# Patient Record
Sex: MALE | Race: WHITE | NOT HISPANIC OR LATINO | Employment: OTHER | URBAN - METROPOLITAN AREA
[De-identification: names, ages, dates, MRNs, and addresses within clinical notes are randomized per-mention and may not be internally consistent; named-entity substitution may affect disease eponyms.]

---

## 2017-01-10 ENCOUNTER — OFFICE VISIT (OUTPATIENT)
Dept: FAMILY MEDICINE CLINIC | Facility: CLINIC | Age: 62
End: 2017-01-10

## 2017-01-10 VITALS
WEIGHT: 247 LBS | TEMPERATURE: 98.2 F | HEART RATE: 96 BPM | RESPIRATION RATE: 16 BRPM | HEIGHT: 70 IN | SYSTOLIC BLOOD PRESSURE: 104 MMHG | OXYGEN SATURATION: 99 % | BODY MASS INDEX: 35.36 KG/M2 | DIASTOLIC BLOOD PRESSURE: 74 MMHG

## 2017-01-10 DIAGNOSIS — R05.9 COUGH: Primary | ICD-10-CM

## 2017-01-10 DIAGNOSIS — J40 BRONCHITIS: ICD-10-CM

## 2017-01-10 DIAGNOSIS — R73.01 IMPAIRED FASTING GLUCOSE: ICD-10-CM

## 2017-01-10 DIAGNOSIS — H54.7 IMPAIRED VISION: ICD-10-CM

## 2017-01-10 DIAGNOSIS — I10 ESSENTIAL HYPERTENSION: ICD-10-CM

## 2017-01-10 PROCEDURE — 99214 OFFICE O/P EST MOD 30 MIN: CPT | Performed by: FAMILY MEDICINE

## 2017-01-10 RX ORDER — LISINOPRIL 20 MG/1
20 TABLET ORAL DAILY
Qty: 90 TABLET | Refills: 1 | Status: SHIPPED | OUTPATIENT
Start: 2017-01-10 | End: 2017-07-31 | Stop reason: SDUPTHER

## 2017-01-10 RX ORDER — AMLODIPINE BESYLATE 5 MG/1
5 TABLET ORAL DAILY
Qty: 90 TABLET | Refills: 1 | Status: SHIPPED | OUTPATIENT
Start: 2017-01-10 | End: 2017-07-31 | Stop reason: SDUPTHER

## 2017-01-10 RX ORDER — AZITHROMYCIN 250 MG/1
TABLET, FILM COATED ORAL
Qty: 6 TABLET | Refills: 0 | Status: SHIPPED | OUTPATIENT
Start: 2017-01-10 | End: 2017-07-31

## 2017-01-10 NOTE — PATIENT INSTRUCTIONS
Exercise 30 minutes most days of the week  Sleep 6-8 hours each night if possible  Low fat, low cholesterol diet   we discussed prescribed medications and how to take them   make sure you get results of any labs/studies ordered today  Low glycemic index diet   proventil inhaler

## 2017-01-10 NOTE — MR AVS SNAPSHOT
Darryn Baxter   1/10/2017 3:00 PM   Office Visit    Provider:  Darryn Nielsen MD   Department:  [facility]   Dept Phone:  [phone]                Your Full Care Plan              Today's Medication Changes          These changes are accurate as of: 1/10/17  4:56 PM.  If you have any questions, ask your nurse or doctor.               New Medication(s)Ordered:     azithromycin 250 MG tablet   Commonly known as:  ZITHROMAX Z-MAG   Take 2 tablets the first day, then 1 tablet daily for 4 days.   Started by:  Darryn Nielsen MD            Where to Get Your Medications      These medications were sent to [address] - [address] [facility] - [phone]  - [phone] [address] [address]     Phone:  [phone]     amLODIPine 5 MG tablet    azithromycin 250 MG tablet    lisinopril 20 MG tablet                  Your Updated Medication List          This list is accurate as of: 1/10/17  4:56 PM.  Always use your most recent med list.                amLODIPine 5 MG tablet   Commonly known as:  NORVASC   Take 1 tablet by mouth Daily.       aspirin 325 MG tablet       azithromycin 250 MG tablet   Commonly known as:  ZITHROMAX Z-MAG   Take 2 tablets the first day, then 1 tablet daily for 4 days.       docusate sodium 50 MG capsule   Commonly known as:  COLACE       lisinopril 20 MG tablet   Commonly known as:  PRINIVIL,ZESTRIL   Take 1 tablet by mouth Daily.       traMADol 50 MG tablet   Commonly known as:  ULTRAM       traMADol-acetaminophen 37.5-325 MG per tablet   Commonly known as:  ULTRACET   Take 1 tablet by mouth Every 4 (Four) Hours As Needed for moderate pain (4-6) for up to 30 doses.               You Were Diagnosed With        Codes Comments    Cough    -  Primary ICD-10-CM: R05  ICD-9-CM: 786.2     Essential hypertension     ICD-10-CM: I10  ICD-9-CM: 401.9     "Impaired fasting glucose     ICD-10-CM: R73.01  ICD-9-CM: 790.21     Impaired vision     ICD-10-CM: H54.7  ICD-9-CM: 369.9     Bronchitis     ICD-10-CM: J40  ICD-9-CM: 490       Instructions    Exercise 30 minutes most days of the week  Sleep 6-8 hours each night if possible  Low fat, low cholesterol diet   we discussed prescribed medications and how to take them   make sure you get results of any labs/studies ordered today  Low glycemic index diet   proventil inhaler       Patient Instructions History      [employer] Signup     Our records indicate that you have an active [employer] account.    You can view your After Visit Summary by going to [employer] and logging in with your [employer] username and password.  If you don't have a [employer] username and password but a parent or guardian has access to your record, the parent or guardian should login with their own [employer] username and password and access your record to view the After Visit Summary.    If you have questions, you can email Protection [email or web address]@"[email or web address]" or call [phone] to talk to our [employer] staff.  Remember, [employer] is NOT to be used for urgent needs.  For medical emergencies, dial 911.               Other Info from Your Visit           Your Appointments     Jul 10, 2017  3:00 PM EDT   Follow Up with Darryn Nielsen MD   [facility] (--)    [address] [place] [id number] [phone]           Arrive 15 minutes prior to appointment.              Allergies     Ether  Anaphylaxis    Latex  Rash      Reason for Visit     Hypertension     Cough           Vital Signs     Blood Pressure Pulse Temperature Respirations Height Weight    104/74 96 98.2 °F (36.8 °C) (Oral) 16 70\" (177.8 cm) 247 lb (112 kg)    Oxygen Saturation Body Mass Index Smoking Status             99% 35.44 kg/m2 Current Every Day Smoker         Problems and Diagnoses Noted     High blood pressure    " Increased fasting blood sugar    Impaired vision    Cough    -  Primary    Bronchitis

## 2017-01-10 NOTE — PROGRESS NOTES
"Subjective   Darryn Baxter is a 61 y.o. male.     History of Present Illness   Chief Complaint:   Chief Complaint   Patient presents with   • Hypertension       Darryn Baxter 61 y.o. male who presents today for Medical Management of the below listed issues and medication refills. He did not have the labs prior to his appointment.  He complains of chest congestion and a cough. He stated that this has been present for 5 days.    he has a history of   Patient Active Problem List   Diagnosis   • Essential hypertension   • Anxiety   • Impaired vision   • Impaired fasting glucose   .  Since the last visit, he has overall felt well.  he has been compliant with   Current Outpatient Prescriptions:   •  amLODIPine (NORVASC) 5 MG tablet, Take 1 tablet by mouth daily. (Patient taking differently: Take 5 mg by mouth Every Morning.), Disp: 90 tablet, Rfl: 1  •  aspirin 325 MG tablet, Take 650 mg by mouth Every 6 (Six) Hours. TO STOP PTS, Disp: , Rfl:   •  docusate sodium (COLACE) 50 MG capsule, Take  by mouth 2 (Two) Times a Day As Needed for constipation., Disp: , Rfl:   •  lisinopril (PRINIVIL,ZESTRIL) 20 MG tablet, Take 1 tablet by mouth daily. (Patient taking differently: Take 20 mg by mouth Every Morning.), Disp: 90 tablet, Rfl: 1  •  traMADol (ULTRAM) 50 MG tablet, Take 50 mg by mouth Every 6 (Six) Hours As Needed for moderate pain (4-6)., Disp: , Rfl:   •  traMADol-acetaminophen (ULTRACET) 37.5-325 MG per tablet, Take 1 tablet by mouth Every 4 (Four) Hours As Needed for moderate pain (4-6) for up to 30 doses., Disp: 30 tablet, Rfl: 0.  he denies medication side effects.    All of the chronic condition(s) listed above are stable w/o issues.    Visit Vitals   • /74   • Pulse 96   • Temp 98.2 °F (36.8 °C) (Oral)   • Resp 16   • Ht 70\" (177.8 cm)   • Wt 247 lb (112 kg)   • SpO2 99%   • BMI 35.44 kg/m2       Results for orders placed or performed in visit on 09/30/16   CBC (No diff): Prior to any surgical procedure unless " no testing indicated as above.   Result Value Ref Range    WBC 8.80 4.50 - 10.70 10*3/mm3    RBC 4.35 (L) 4.60 - 6.00 10*6/mm3    Hemoglobin 14.2 13.7 - 17.6 g/dL    Hematocrit 42.5 40.4 - 52.2 %    MCV 97.7 (H) 79.8 - 96.2 fL    MCH 32.6 27.0 - 32.7 pg    MCHC 33.4 32.6 - 36.4 g/dL    RDW 12.6 11.5 - 14.5 %    RDW-SD 45.4 37.0 - 54.0 fl    MPV 9.6 6.0 - 12.0 fL    Platelets 250 140 - 500 10*3/mm3   Basic metabolic panel: Patients with a reported history of chronic illness (example: DM, HTN, Renal Disease) and patients who are on multiple or long-term medications.   Result Value Ref Range    Glucose 104 (H) 65 - 99 mg/dL    BUN 13 8 - 23 mg/dL    Creatinine 0.84 0.76 - 1.27 mg/dL    Sodium 140 136 - 145 mmol/L    Potassium 4.9 3.5 - 5.2 mmol/L    Chloride 104 98 - 107 mmol/L    CO2 22.5 22.0 - 29.0 mmol/L    Calcium 9.5 8.6 - 10.5 mg/dL    eGFR Non African Amer 92 >60 mL/min/1.73    BUN/Creatinine Ratio 15.5 7.0 - 25.0    Anion Gap 13.5 mmol/L         The following portions of the patient's history were reviewed and updated as appropriate: allergies, current medications, past family history, past medical history, past social history, past surgical history and problem list.    Review of Systems   Constitutional: Negative for activity change, appetite change and unexpected weight change.   HENT: Positive for congestion and sinus pressure.    Eyes: Negative for visual disturbance.   Respiratory: Positive for cough. Negative for chest tightness and shortness of breath.    Cardiovascular: Negative for chest pain and palpitations.   Skin: Negative for color change.   Neurological: Negative for syncope and speech difficulty.   Psychiatric/Behavioral: Negative for confusion and decreased concentration.       Objective   Physical Exam   Constitutional: He is oriented to person, place, and time. He appears well-developed and well-nourished.   HENT:   Head: Normocephalic and atraumatic.   Right Ear: External ear normal.   Left  Ear: External ear normal.   Nose: Nose normal.   Mouth/Throat: Oropharynx is clear and moist.   Eyes: EOM are normal. Pupils are equal, round, and reactive to light.   Neck: Normal range of motion. Neck supple.   Cardiovascular: Normal rate, regular rhythm, normal heart sounds and intact distal pulses.    Pulmonary/Chest: No respiratory distress. He has wheezes. He has no rales. He exhibits no tenderness.    Wheezing bilateral  No rales   Abdominal: Soft.   Musculoskeletal: Normal range of motion.   Neurological: He is alert and oriented to person, place, and time.   Psychiatric: He has a normal mood and affect. His behavior is normal. Judgment and thought content normal.   Nursing note and vitals reviewed.      Assessment/Plan   Problems Addressed this Visit        Cardiovascular and Mediastinum    Essential hypertension    Relevant Medications    amLODIPine (NORVASC) 5 MG tablet    lisinopril (PRINIVIL,ZESTRIL) 20 MG tablet       Endocrine    Impaired fasting glucose       Other    Impaired vision      Other Visit Diagnoses     Cough    -  Primary    Bronchitis

## 2017-07-19 LAB
CHOLEST SERPL-MCNC: 168 MG/DL (ref 0–200)
GLUCOSE P FAST SERPL-MCNC: 107 MG/DL (ref 74–106)
HBA1C MFR BLD: 5.92 % (ref 4.8–5.6)
HDLC SERPL-MCNC: 36 MG/DL (ref 40–60)
LDLC SERPL CALC-MCNC: 109 MG/DL (ref 0–100)
TRIGL SERPL-MCNC: 113 MG/DL (ref 0–150)
VLDLC SERPL CALC-MCNC: 22.6 MG/DL (ref 5–40)

## 2017-07-31 ENCOUNTER — OFFICE VISIT (OUTPATIENT)
Dept: FAMILY MEDICINE CLINIC | Facility: CLINIC | Age: 62
End: 2017-07-31

## 2017-07-31 ENCOUNTER — RESULTS ENCOUNTER (OUTPATIENT)
Dept: FAMILY MEDICINE CLINIC | Facility: CLINIC | Age: 62
End: 2017-07-31

## 2017-07-31 VITALS
TEMPERATURE: 97.6 F | WEIGHT: 240 LBS | HEIGHT: 70 IN | DIASTOLIC BLOOD PRESSURE: 70 MMHG | SYSTOLIC BLOOD PRESSURE: 104 MMHG | OXYGEN SATURATION: 96 % | HEART RATE: 89 BPM | RESPIRATION RATE: 16 BRPM | BODY MASS INDEX: 34.36 KG/M2

## 2017-07-31 DIAGNOSIS — R73.01 IMPAIRED FASTING GLUCOSE: Primary | ICD-10-CM

## 2017-07-31 DIAGNOSIS — R39.14 FEELING OF INCOMPLETE BLADDER EMPTYING: ICD-10-CM

## 2017-07-31 DIAGNOSIS — N52.9 ERECTILE DYSFUNCTION, UNSPECIFIED ERECTILE DYSFUNCTION TYPE: ICD-10-CM

## 2017-07-31 DIAGNOSIS — I10 ESSENTIAL HYPERTENSION: ICD-10-CM

## 2017-07-31 DIAGNOSIS — R73.01 IMPAIRED FASTING GLUCOSE: ICD-10-CM

## 2017-07-31 PROCEDURE — 99214 OFFICE O/P EST MOD 30 MIN: CPT | Performed by: FAMILY MEDICINE

## 2017-07-31 RX ORDER — AMLODIPINE BESYLATE 5 MG/1
5 TABLET ORAL DAILY
Qty: 90 TABLET | Refills: 1 | Status: SHIPPED | OUTPATIENT
Start: 2017-07-31 | End: 2017-08-28 | Stop reason: HOSPADM

## 2017-07-31 RX ORDER — LISINOPRIL 20 MG/1
20 TABLET ORAL DAILY
Qty: 90 TABLET | Refills: 1 | Status: SHIPPED | OUTPATIENT
Start: 2017-07-31 | End: 2018-03-16 | Stop reason: SDUPTHER

## 2017-07-31 NOTE — PATIENT INSTRUCTIONS
Exercise 30 minutes most days of the week  Sleep 6-8 hours each night if possible  Low fat, low cholesterol diet   we discussed prescribed medications and how to take them   make sure you get results of any labs/studies ordered today  Low glycemic index diet

## 2017-07-31 NOTE — PROGRESS NOTES
"Subjective   Darryn Baxter is a 62 y.o. male.     History of Present Illness   Chief Complaint:   Chief Complaint   Patient presents with   • Hypertension   • Impaired fasting glucose       Darryn Baxter 62 y.o. male who presents today for Medical Management of the below listed issues and medication refills. I reviewed his lab results. I reviewed his blood pressure log.   he has a history of    bp  104/70   Needs labs   Bring in bp machine     Wants test level for ed  I reviewed hid bp log   Patient Active Problem List   Diagnosis   • Essential hypertension   • Anxiety   • Impaired vision   • Impaired fasting glucose   • Blindness of both eyes   .  Since the last visit, he has overall felt well.  he has been compliant with   Current Outpatient Prescriptions:   •  amLODIPine (NORVASC) 5 MG tablet, Take 1 tablet by mouth Daily., Disp: 90 tablet, Rfl: 1  •  aspirin 325 MG tablet, Take 650 mg by mouth Every 6 (Six) Hours. TO STOP PTS, Disp: , Rfl:   •  docusate sodium (COLACE) 50 MG capsule, Take  by mouth 2 (Two) Times a Day As Needed for constipation., Disp: , Rfl:   •  lisinopril (PRINIVIL,ZESTRIL) 20 MG tablet, Take 1 tablet by mouth Daily., Disp: 90 tablet, Rfl: 1  •  traMADol (ULTRAM) 50 MG tablet, Take 50 mg by mouth Every 6 (Six) Hours As Needed for moderate pain (4-6)., Disp: , Rfl: .  he denies medication side effects.    All of the chronic condition(s) listed above are stable w/o issues.    /70  Pulse 89  Temp 97.6 °F (36.4 °C) (Oral)   Resp 16  Ht 70\" (177.8 cm)  Wt 240 lb (109 kg)  SpO2 96%  BMI 34.44 kg/m2    Results for orders placed or performed in visit on 07/18/17   Lipid Panel   Result Value Ref Range    Total Cholesterol 168 0 - 200 mg/dL    Triglycerides 113 0 - 150 mg/dL    HDL Cholesterol 36 (L) 40 - 60 mg/dL    VLDL Cholesterol 22.6 5 - 40 mg/dL    LDL Cholesterol  109 (H) 0 - 100 mg/dL   Hemoglobin A1c   Result Value Ref Range    Hemoglobin A1C 5.92 (H) 4.80 - 5.60 %   Glucose, " Plasma ([facility])   Result Value Ref Range    Glucose 107 (H) 74 - 106 mg/dL         The following portions of the patient's history were reviewed and updated as appropriate: allergies, current medications, past family history, past medical history, past social history, past surgical history and problem list.    Review of Systems   Constitutional: Negative for activity change, appetite change and unexpected weight change.   Eyes: Positive for visual disturbance.   Respiratory: Negative for chest tightness and shortness of breath.    Cardiovascular: Negative for chest pain and palpitations.   Skin: Negative for color change.   Neurological: Negative for syncope and speech difficulty.   Psychiatric/Behavioral: Negative for confusion and decreased concentration.       Objective   Physical Exam   Constitutional: He appears well-developed and well-nourished.   HENT:   Right Ear: External ear normal.   Left Ear: External ear normal.   Eyes:   Legally blind   Neck: Neck supple.   Cardiovascular: Normal rate and regular rhythm.    Pulmonary/Chest: Effort normal and breath sounds normal.   Abdominal: Soft. Bowel sounds are normal.   Musculoskeletal: Normal range of motion.   Neurological: He is alert.   Skin: Skin is warm and dry.   Psychiatric: He has a normal mood and affect. His behavior is normal.   Nursing note and vitals reviewed.      Assessment/Plan   Darryn was seen today for hypertension and impaired fasting glucose.    Diagnoses and all orders for this visit:    Impaired fasting glucose  -     Comprehensive metabolic panel; Future  -     Lipid panel; Future  -     CBC and Differential; Future  -     TSH; Future  -     PSA; Future  -     Testosterone, Free, Total; Future    Essential hypertension  -     amLODIPine (NORVASC) 5 MG tablet; Take 1 tablet by mouth Daily.  -     lisinopril (PRINIVIL,ZESTRIL) 20 MG tablet; Take 1 tablet by mouth Daily.  -     Comprehensive metabolic panel; Future  -     Lipid panel;  Future  -     CBC and Differential; Future  -     TSH; Future  -     PSA; Future  -     Testosterone, Free, Total; Future    Erectile dysfunction, unspecified erectile dysfunction type  -     Comprehensive metabolic panel; Future  -     Lipid panel; Future  -     CBC and Differential; Future  -     TSH; Future  -     PSA; Future  -     Testosterone, Free, Total; Future    Feeling of incomplete bladder emptying   -     PSA; Future

## 2017-08-05 ENCOUNTER — RESULTS ENCOUNTER (OUTPATIENT)
Dept: FAMILY MEDICINE CLINIC | Facility: CLINIC | Age: 62
End: 2017-08-05

## 2017-08-05 DIAGNOSIS — N52.9 ERECTILE DYSFUNCTION, UNSPECIFIED ERECTILE DYSFUNCTION TYPE: ICD-10-CM

## 2017-08-05 DIAGNOSIS — I10 ESSENTIAL HYPERTENSION: ICD-10-CM

## 2017-08-05 DIAGNOSIS — R73.01 IMPAIRED FASTING GLUCOSE: ICD-10-CM

## 2017-08-25 LAB
ALBUMIN SERPL-MCNC: 4.4 G/DL (ref 3.5–5.2)
ALBUMIN/GLOB SERPL: 1.5 G/DL
ALP SERPL-CCNC: 76 U/L (ref 39–117)
ALT SERPL-CCNC: 17 U/L (ref 1–41)
AST SERPL-CCNC: 18 U/L (ref 1–40)
BASOPHILS # BLD AUTO: 0.04 10*3/MM3 (ref 0–0.2)
BASOPHILS NFR BLD AUTO: 0.3 % (ref 0–1.5)
BILIRUB SERPL-MCNC: 0.4 MG/DL (ref 0.1–1.2)
BUN SERPL-MCNC: 17 MG/DL (ref 8–23)
BUN/CREAT SERPL: 16 (ref 7–25)
CALCIUM SERPL-MCNC: 9.4 MG/DL (ref 8.6–10.5)
CHLORIDE SERPL-SCNC: 99 MMOL/L (ref 98–107)
CHOLEST SERPL-MCNC: 166 MG/DL (ref 0–200)
CO2 SERPL-SCNC: 23.9 MMOL/L (ref 22–29)
CREAT SERPL-MCNC: 1.06 MG/DL (ref 0.76–1.27)
EOSINOPHIL # BLD AUTO: 0.56 10*3/MM3 (ref 0–0.7)
EOSINOPHIL NFR BLD AUTO: 4.3 % (ref 0.3–6.2)
ERYTHROCYTE [DISTWIDTH] IN BLOOD BY AUTOMATED COUNT: 13.5 % (ref 11.5–14.5)
GLOBULIN SER CALC-MCNC: 2.9 GM/DL
GLUCOSE SERPL-MCNC: 111 MG/DL (ref 65–99)
HCT VFR BLD AUTO: 43.7 % (ref 40.4–52.2)
HDLC SERPL-MCNC: 33 MG/DL (ref 40–60)
HGB BLD-MCNC: 14.3 G/DL (ref 13.7–17.6)
IMM GRANULOCYTES # BLD: 0.04 10*3/MM3 (ref 0–0.03)
IMM GRANULOCYTES NFR BLD: 0.3 % (ref 0–0.5)
LDLC SERPL CALC-MCNC: 107 MG/DL (ref 0–100)
LYMPHOCYTES # BLD AUTO: 2.65 10*3/MM3 (ref 0.9–4.8)
LYMPHOCYTES NFR BLD AUTO: 20.2 % (ref 19.6–45.3)
MCH RBC QN AUTO: 32.3 PG (ref 27–32.7)
MCHC RBC AUTO-ENTMCNC: 32.7 G/DL (ref 32.6–36.4)
MCV RBC AUTO: 98.6 FL (ref 79.8–96.2)
MONOCYTES # BLD AUTO: 1.22 10*3/MM3 (ref 0.2–1.2)
MONOCYTES NFR BLD AUTO: 9.3 % (ref 5–12)
NEUTROPHILS # BLD AUTO: 8.61 10*3/MM3 (ref 1.9–8.1)
NEUTROPHILS NFR BLD AUTO: 65.6 % (ref 42.7–76)
PLATELET # BLD AUTO: 232 10*3/MM3 (ref 140–500)
POTASSIUM SERPL-SCNC: 5.2 MMOL/L (ref 3.5–5.2)
PROT SERPL-MCNC: 7.3 G/DL (ref 6–8.5)
PSA SERPL-MCNC: 0.31 NG/ML (ref 0–4)
RBC # BLD AUTO: 4.43 10*6/MM3 (ref 4.6–6)
SODIUM SERPL-SCNC: 139 MMOL/L (ref 136–145)
TESTOST FREE SERPL-MCNC: 5.3 PG/ML (ref 6.6–18.1)
TESTOST SERPL-MCNC: 386 NG/DL (ref 264–916)
TRIGL SERPL-MCNC: 128 MG/DL (ref 0–150)
TSH SERPL DL<=0.005 MIU/L-ACNC: 1.32 MIU/ML (ref 0.27–4.2)
VLDLC SERPL CALC-MCNC: 25.6 MG/DL (ref 5–40)
WBC # BLD AUTO: 13.12 10*3/MM3 (ref 4.5–10.7)

## 2017-08-28 ENCOUNTER — OFFICE VISIT (OUTPATIENT)
Dept: FAMILY MEDICINE CLINIC | Facility: CLINIC | Age: 62
End: 2017-08-28

## 2017-08-28 VITALS
DIASTOLIC BLOOD PRESSURE: 78 MMHG | WEIGHT: 242 LBS | HEIGHT: 70 IN | BODY MASS INDEX: 34.65 KG/M2 | HEART RATE: 86 BPM | TEMPERATURE: 97.9 F | RESPIRATION RATE: 16 BRPM | OXYGEN SATURATION: 95 % | SYSTOLIC BLOOD PRESSURE: 118 MMHG

## 2017-08-28 DIAGNOSIS — I10 ESSENTIAL HYPERTENSION: Primary | ICD-10-CM

## 2017-08-28 DIAGNOSIS — D72.829 LEUKOCYTOSIS, UNSPECIFIED TYPE: ICD-10-CM

## 2017-08-28 DIAGNOSIS — R73.01 IMPAIRED FASTING GLUCOSE: ICD-10-CM

## 2017-08-28 DIAGNOSIS — Z12.5 SCREENING FOR PROSTATE CANCER: ICD-10-CM

## 2017-08-28 PROCEDURE — 99214 OFFICE O/P EST MOD 30 MIN: CPT | Performed by: FAMILY MEDICINE

## 2017-08-28 NOTE — PROGRESS NOTES
"Subjective   Darryn Baxter is a 62 y.o. male.     History of Present Illness   Chief Complaint:   Chief Complaint   Patient presents with   • Hypertension   • IFG       Darryn Baxter 62 y.o. male who presents today for Medical Management of the below listed issues. I reviewed his lab results. He had a prostate cancer screening.  bp 92/60 and standing same.  Stop norvasc   Continue lisinopril 20mg daily.    Reviewed labs  he has a problem list of   Patient Active Problem List   Diagnosis   • Essential hypertension   • Anxiety   • Impaired vision   • Impaired fasting glucose   • Blindness of both eyes   .  Since the last visit, he has overall felt well.  he has been compliant with   Current Outpatient Prescriptions:   •  amLODIPine (NORVASC) 5 MG tablet, Take 1 tablet by mouth Daily., Disp: 90 tablet, Rfl: 1  •  aspirin 325 MG tablet, Take 650 mg by mouth Every 6 (Six) Hours. TO STOP PTS, Disp: , Rfl:   •  docusate sodium (COLACE) 50 MG capsule, Take  by mouth 2 (Two) Times a Day As Needed for constipation., Disp: , Rfl:   •  lisinopril (PRINIVIL,ZESTRIL) 20 MG tablet, Take 1 tablet by mouth Daily., Disp: 90 tablet, Rfl: 1  •  traMADol (ULTRAM) 50 MG tablet, Take 50 mg by mouth Every 6 (Six) Hours As Needed for moderate pain (4-6)., Disp: , Rfl: .  he denies medication side effects.    All of the chronic condition(s) listed above are stable w/o issues.    /78  Pulse 86  Temp 97.9 °F (36.6 °C) (Oral)   Resp 16  Ht 70\" (177.8 cm)  Wt 242 lb (110 kg)  SpO2 95%  BMI 34.72 kg/m2    Results for orders placed or performed in visit on 07/31/17   Comprehensive metabolic panel   Result Value Ref Range    Glucose 111 (H) 65 - 99 mg/dL    BUN 17 8 - 23 mg/dL    Creatinine 1.06 0.76 - 1.27 mg/dL    eGFR Non African Am 70 >60 mL/min/1.73    eGFR African Am 85 >60 mL/min/1.73    BUN/Creatinine Ratio 16.0 7.0 - 25.0    Sodium 139 136 - 145 mmol/L    Potassium 5.2 3.5 - 5.2 mmol/L    Chloride 99 98 - 107 mmol/L    Total " CO2 23.9 22.0 - 29.0 mmol/L    Calcium 9.4 8.6 - 10.5 mg/dL    Total Protein 7.3 6.0 - 8.5 g/dL    Albumin 4.40 3.50 - 5.20 g/dL    Globulin 2.9 gm/dL    A/G Ratio 1.5 g/dL    Total Bilirubin 0.4 0.1 - 1.2 mg/dL    Alkaline Phosphatase 76 39 - 117 U/L    AST (SGOT) 18 1 - 40 U/L    ALT (SGPT) 17 1 - 41 U/L   Lipid panel   Result Value Ref Range    Total Cholesterol 166 0 - 200 mg/dL    Triglycerides 128 0 - 150 mg/dL    HDL Cholesterol 33 (L) 40 - 60 mg/dL    VLDL Cholesterol 25.6 5 - 40 mg/dL    LDL Cholesterol  107 (H) 0 - 100 mg/dL   TSH   Result Value Ref Range    TSH 1.320 0.270 - 4.200 mIU/mL   PSA   Result Value Ref Range    PSA 0.308 0.000 - 4.000 ng/mL   Testosterone, Free, Total   Result Value Ref Range    Testosterone, Total 386 264 - 916 ng/dL    Testosterone, Free 5.3 (L) 6.6 - 18.1 pg/mL   CBC and Differential   Result Value Ref Range    WBC 13.12 (H) 4.50 - 10.70 10*3/mm3    RBC 4.43 (L) 4.60 - 6.00 10*6/mm3    Hemoglobin 14.3 13.7 - 17.6 g/dL    Hematocrit 43.7 40.4 - 52.2 %    MCV 98.6 (H) 79.8 - 96.2 fL    MCH 32.3 27.0 - 32.7 pg    MCHC 32.7 32.6 - 36.4 g/dL    RDW 13.5 11.5 - 14.5 %    Platelets 232 140 - 500 10*3/mm3    Neutrophil Rel % 65.6 42.7 - 76.0 %    Lymphocyte Rel % 20.2 19.6 - 45.3 %    Monocyte Rel % 9.3 5.0 - 12.0 %    Eosinophil Rel % 4.3 0.3 - 6.2 %    Basophil Rel % 0.3 0.0 - 1.5 %    Neutrophils Absolute 8.61 (H) 1.90 - 8.10 10*3/mm3    Lymphocytes Absolute 2.65 0.90 - 4.80 10*3/mm3    Monocytes Absolute 1.22 (H) 0.20 - 1.20 10*3/mm3    Eosinophils Absolute 0.56 0.00 - 0.70 10*3/mm3    Basophils Absolute 0.04 0.00 - 0.20 10*3/mm3    Immature Granulocyte Rel % 0.3 0.0 - 0.5 %    Immature Grans Absolute 0.04 (H) 0.00 - 0.03 10*3/mm3         The following portions of the patient's history were reviewed and updated as appropriate: allergies, current medications, past family history, past medical history, past social history, past surgical history and problem list.    Review of  Systems   Constitutional: Negative for activity change, appetite change and unexpected weight change.   Respiratory: Negative for chest tightness and shortness of breath.    Cardiovascular: Negative for chest pain and palpitations.   Skin: Negative for color change.   Neurological: Negative for syncope and speech difficulty.   Psychiatric/Behavioral: Negative for confusion and decreased concentration.       Objective   Physical Exam   Constitutional: He is oriented to person, place, and time. He appears well-developed and well-nourished.   HENT:   Head: Normocephalic.   Eyes: EOM are normal. Pupils are equal, round, and reactive to light.   Neck: Normal range of motion. Neck supple.   Cardiovascular: Normal rate and regular rhythm.    Pulmonary/Chest: Effort normal and breath sounds normal.   Genitourinary: Prostate normal.   Musculoskeletal: Normal range of motion.   Neurological: He is alert and oriented to person, place, and time.   Skin: Skin is dry.   Psychiatric: He has a normal mood and affect. His behavior is normal.   Nursing note and vitals reviewed.      Assessment/Plan   Darryn was seen today for hypertension and ifg.    Diagnoses and all orders for this visit:    Essential hypertension    Impaired fasting glucose    Leukocytosis, unspecified type  -     CBC & Differential    Screening for prostate cancer

## 2017-08-28 NOTE — PATIENT INSTRUCTIONS
Exercise 30 minutes most days of the week  Sleep 6-8 hours each night if possible  Low fat, low cholesterol diet   we discussed prescribed medications and how to take them   make sure you get results of any labs/studies ordered today  Low glycemic index diet     Cbc   bp  1 momth

## 2017-09-26 LAB
BASOPHILS # BLD AUTO: 0.04 10*3/MM3 (ref 0–0.2)
BASOPHILS NFR BLD AUTO: 0.5 % (ref 0–1.5)
EOSINOPHIL # BLD AUTO: 0.74 10*3/MM3 (ref 0–0.7)
EOSINOPHIL NFR BLD AUTO: 9.7 % (ref 0.3–6.2)
ERYTHROCYTE [DISTWIDTH] IN BLOOD BY AUTOMATED COUNT: 14.2 % (ref 11.5–14.5)
HCT VFR BLD AUTO: 42 % (ref 40.4–52.2)
HGB BLD-MCNC: 13.6 G/DL (ref 13.7–17.6)
IMM GRANULOCYTES # BLD: 0.02 10*3/MM3 (ref 0–0.03)
IMM GRANULOCYTES NFR BLD: 0.3 % (ref 0–0.5)
LYMPHOCYTES # BLD AUTO: 2.75 10*3/MM3 (ref 0.9–4.8)
LYMPHOCYTES NFR BLD AUTO: 36 % (ref 19.6–45.3)
MCH RBC QN AUTO: 32.2 PG (ref 27–32.7)
MCHC RBC AUTO-ENTMCNC: 32.4 G/DL (ref 32.6–36.4)
MCV RBC AUTO: 99.5 FL (ref 79.8–96.2)
MONOCYTES # BLD AUTO: 0.72 10*3/MM3 (ref 0.2–1.2)
MONOCYTES NFR BLD AUTO: 9.4 % (ref 5–12)
NEUTROPHILS # BLD AUTO: 3.36 10*3/MM3 (ref 1.9–8.1)
NEUTROPHILS NFR BLD AUTO: 44.1 % (ref 42.7–76)
PLATELET # BLD AUTO: 254 10*3/MM3 (ref 140–500)
RBC # BLD AUTO: 4.22 10*6/MM3 (ref 4.6–6)
TESTOST FREE SERPL-MCNC: 6.2 PG/ML (ref 6.6–18.1)
TESTOST SERPL-MCNC: 438 NG/DL (ref 264–916)
WBC # BLD AUTO: 7.63 10*3/MM3 (ref 4.5–10.7)

## 2017-09-27 ENCOUNTER — OFFICE VISIT (OUTPATIENT)
Dept: FAMILY MEDICINE CLINIC | Facility: CLINIC | Age: 62
End: 2017-09-27

## 2017-09-27 VITALS
HEART RATE: 85 BPM | RESPIRATION RATE: 16 BRPM | BODY MASS INDEX: 34.36 KG/M2 | WEIGHT: 240 LBS | HEIGHT: 70 IN | DIASTOLIC BLOOD PRESSURE: 74 MMHG | SYSTOLIC BLOOD PRESSURE: 120 MMHG | TEMPERATURE: 98.1 F | OXYGEN SATURATION: 97 %

## 2017-09-27 DIAGNOSIS — I10 ESSENTIAL HYPERTENSION: Primary | ICD-10-CM

## 2017-09-27 PROCEDURE — 99214 OFFICE O/P EST MOD 30 MIN: CPT | Performed by: FAMILY MEDICINE

## 2017-09-27 RX ORDER — SENNOSIDES 8.6 MG
650 CAPSULE ORAL EVERY 6 HOURS PRN
COMMUNITY
End: 2019-01-28

## 2017-09-27 NOTE — PROGRESS NOTES
"Subjective   Darryn Baxter is a 62 y.o. male.     History of Present Illness   Chief Complaint:   Chief Complaint   Patient presents with   • Hypertension       Darryn Baxter 62 y.o. male who presents today for Medical Management of the below listed issues. I reviewed his lab results. His blood pressures at home were 145/92, 152/91, 170/92, 148/89, 135/87, 146/87, 123/85, 135/90 and 125/80. he has a problem list of  I got bp 120/74.  Patient Active Problem List   Diagnosis   • Essential hypertension   • Anxiety   • Impaired vision   • Impaired fasting glucose   • Blindness of both eyes   .  Since the last visit, he has overall felt well.  he has been compliant with   Current Outpatient Prescriptions:   •  aspirin 325 MG tablet, Take 650 mg by mouth Every 6 (Six) Hours. TO STOP PTS, Disp: , Rfl:   •  docusate sodium (COLACE) 50 MG capsule, Take  by mouth 2 (Two) Times a Day As Needed for constipation., Disp: , Rfl:   •  lisinopril (PRINIVIL,ZESTRIL) 20 MG tablet, Take 1 tablet by mouth Daily., Disp: 90 tablet, Rfl: 1  •  traMADol (ULTRAM) 50 MG tablet, Take 50 mg by mouth Every 6 (Six) Hours As Needed for moderate pain (4-6)., Disp: , Rfl: .  he denies medication side effects.    All of the chronic condition(s) listed above are stable w/o issues.    /78  Pulse 85  Temp 98.1 °F (36.7 °C) (Oral)   Resp 16  Ht 70\" (177.8 cm)  Wt 240 lb (109 kg)  SpO2 97%  BMI 34.44 kg/m2    Results for orders placed or performed in visit on 08/05/17   Testosterone, Free, Total   Result Value Ref Range    Testosterone, Total 438 264 - 916 ng/dL    Testosterone, Free 6.2 (L) 6.6 - 18.1 pg/mL   CBC & Differential   Result Value Ref Range    WBC 7.63 4.50 - 10.70 10*3/mm3    RBC 4.22 (L) 4.60 - 6.00 10*6/mm3    Hemoglobin 13.6 (L) 13.7 - 17.6 g/dL    Hematocrit 42.0 40.4 - 52.2 %    MCV 99.5 (H) 79.8 - 96.2 fL    MCH 32.2 27.0 - 32.7 pg    MCHC 32.4 (L) 32.6 - 36.4 g/dL    RDW 14.2 11.5 - 14.5 %    Platelets 254 140 - 500 " 10*3/mm3    Neutrophil Rel % 44.1 42.7 - 76.0 %    Lymphocyte Rel % 36.0 19.6 - 45.3 %    Monocyte Rel % 9.4 5.0 - 12.0 %    Eosinophil Rel % 9.7 (H) 0.3 - 6.2 %    Basophil Rel % 0.5 0.0 - 1.5 %    Neutrophils Absolute 3.36 1.90 - 8.10 10*3/mm3    Lymphocytes Absolute 2.75 0.90 - 4.80 10*3/mm3    Monocytes Absolute 0.72 0.20 - 1.20 10*3/mm3    Eosinophils Absolute 0.74 (H) 0.00 - 0.70 10*3/mm3    Basophils Absolute 0.04 0.00 - 0.20 10*3/mm3    Immature Granulocyte Rel % 0.3 0.0 - 0.5 %    Immature Grans Absolute 0.02 0.00 - 0.03 10*3/mm3         The following portions of the patient's history were reviewed and updated as appropriate: allergies, current medications, past family history, past medical history, past social history, past surgical history and problem list.    Review of Systems   Constitutional: Negative for activity change, appetite change and unexpected weight change.   Respiratory: Negative for chest tightness and shortness of breath.    Cardiovascular: Negative for chest pain and palpitations.   Skin: Negative for color change.   Neurological: Negative for syncope and speech difficulty.   Psychiatric/Behavioral: Negative for confusion and decreased concentration.       Objective   Physical Exam   Constitutional: He appears well-developed and well-nourished.   HENT:   Head: Normocephalic and atraumatic.   Eyes: EOM are normal. Pupils are equal, round, and reactive to light.   Neck: Normal range of motion. Neck supple.   Cardiovascular: Normal rate and regular rhythm.    Pulmonary/Chest: Effort normal and breath sounds normal.   Abdominal: Soft.   Musculoskeletal: Normal range of motion.   Neurological: He is alert. He has normal reflexes.   Psychiatric: He has a normal mood and affect.   Nursing note and vitals reviewed.      Assessment/Plan   Darryn was seen today for hypertension.    Diagnoses and all orders for this visit:    Essential hypertension

## 2018-01-22 DIAGNOSIS — I10 ESSENTIAL HYPERTENSION: Primary | ICD-10-CM

## 2018-01-22 DIAGNOSIS — R73.01 IMPAIRED FASTING GLUCOSE: ICD-10-CM

## 2018-01-22 DIAGNOSIS — F41.9 ANXIETY: ICD-10-CM

## 2018-01-22 DIAGNOSIS — H54.7 IMPAIRED VISION: ICD-10-CM

## 2018-01-23 ENCOUNTER — OFFICE VISIT (OUTPATIENT)
Dept: FAMILY MEDICINE CLINIC | Facility: CLINIC | Age: 63
End: 2018-01-23

## 2018-01-23 VITALS
WEIGHT: 234 LBS | TEMPERATURE: 98.3 F | RESPIRATION RATE: 16 BRPM | SYSTOLIC BLOOD PRESSURE: 117 MMHG | DIASTOLIC BLOOD PRESSURE: 72 MMHG | OXYGEN SATURATION: 96 % | BODY MASS INDEX: 33.5 KG/M2 | HEART RATE: 88 BPM | HEIGHT: 70 IN

## 2018-01-23 DIAGNOSIS — R10.9 ABDOMINAL PAIN, UNSPECIFIED ABDOMINAL LOCATION: Primary | ICD-10-CM

## 2018-01-23 DIAGNOSIS — R42 DIZZINESS: ICD-10-CM

## 2018-01-23 DIAGNOSIS — R10.9 ABDOMINAL CRAMPING: ICD-10-CM

## 2018-01-23 DIAGNOSIS — R11.0 NAUSEA: ICD-10-CM

## 2018-01-23 LAB
ALBUMIN SERPL-MCNC: 4.1 G/DL (ref 3.5–5.2)
ALBUMIN/GLOB SERPL: 1.3 G/DL
ALP SERPL-CCNC: 68 U/L (ref 39–117)
ALT SERPL-CCNC: 12 U/L (ref 1–41)
AST SERPL-CCNC: 17 U/L (ref 1–40)
BASOPHILS # BLD AUTO: 0.02 10*3/MM3 (ref 0–0.2)
BASOPHILS NFR BLD AUTO: 0.2 % (ref 0–1.5)
BILIRUB SERPL-MCNC: 0.4 MG/DL (ref 0.1–1.2)
BUN SERPL-MCNC: 14 MG/DL (ref 8–23)
BUN/CREAT SERPL: 14.1 (ref 7–25)
CALCIUM SERPL-MCNC: 9.3 MG/DL (ref 8.6–10.5)
CHLORIDE SERPL-SCNC: 100 MMOL/L (ref 98–107)
CHOLEST SERPL-MCNC: 147 MG/DL (ref 0–200)
CO2 SERPL-SCNC: 23.5 MMOL/L (ref 22–29)
CREAT SERPL-MCNC: 0.99 MG/DL (ref 0.76–1.27)
EOSINOPHIL # BLD AUTO: 0.2 10*3/MM3 (ref 0–0.7)
EOSINOPHIL NFR BLD AUTO: 1.9 % (ref 0.3–6.2)
ERYTHROCYTE [DISTWIDTH] IN BLOOD BY AUTOMATED COUNT: 13.3 % (ref 11.5–14.5)
GLOBULIN SER CALC-MCNC: 3.2 GM/DL
GLUCOSE SERPL-MCNC: 108 MG/DL (ref 65–99)
HBA1C MFR BLD: 6.04 % (ref 4.8–5.6)
HCT VFR BLD AUTO: 43.4 % (ref 40.4–52.2)
HDLC SERPL-MCNC: 34 MG/DL (ref 40–60)
HGB BLD-MCNC: 14.1 G/DL (ref 13.7–17.6)
IMM GRANULOCYTES # BLD: 0.02 10*3/MM3 (ref 0–0.03)
IMM GRANULOCYTES NFR BLD: 0.2 % (ref 0–0.5)
LDLC SERPL CALC-MCNC: 93 MG/DL (ref 0–100)
LYMPHOCYTES # BLD AUTO: 2.18 10*3/MM3 (ref 0.9–4.8)
LYMPHOCYTES NFR BLD AUTO: 20.4 % (ref 19.6–45.3)
MCH RBC QN AUTO: 31.8 PG (ref 27–32.7)
MCHC RBC AUTO-ENTMCNC: 32.5 G/DL (ref 32.6–36.4)
MCV RBC AUTO: 98 FL (ref 79.8–96.2)
MONOCYTES # BLD AUTO: 1.34 10*3/MM3 (ref 0.2–1.2)
MONOCYTES NFR BLD AUTO: 12.5 % (ref 5–12)
NEUTROPHILS # BLD AUTO: 6.92 10*3/MM3 (ref 1.9–8.1)
NEUTROPHILS NFR BLD AUTO: 64.8 % (ref 42.7–76)
PLATELET # BLD AUTO: 318 10*3/MM3 (ref 140–500)
POTASSIUM SERPL-SCNC: 4.6 MMOL/L (ref 3.5–5.2)
PROT SERPL-MCNC: 7.3 G/DL (ref 6–8.5)
RBC # BLD AUTO: 4.43 10*6/MM3 (ref 4.6–6)
SODIUM SERPL-SCNC: 140 MMOL/L (ref 136–145)
TRIGL SERPL-MCNC: 99 MG/DL (ref 0–150)
TSH SERPL DL<=0.005 MIU/L-ACNC: 1.29 MIU/ML (ref 0.27–4.2)
VLDLC SERPL CALC-MCNC: 19.8 MG/DL (ref 5–40)
WBC # BLD AUTO: 10.68 10*3/MM3 (ref 4.5–10.7)

## 2018-01-23 PROCEDURE — 99214 OFFICE O/P EST MOD 30 MIN: CPT | Performed by: FAMILY MEDICINE

## 2018-01-23 NOTE — PROGRESS NOTES
"Subjective   Darryn Baxter is a 62 y.o. male.     History of Present Illness   Chief Complaint:   Chief Complaint   Patient presents with   • Abdominal Pain   • Abdominal Cramping   • Nausea     Darryn Baxter 62 y.o. male who presents for evaluation of intermittent RLQ pain with nausea. Symptoms include RLQ abdominal pain with cramping, dizziness, fatigue and nausea.  This has been occurring intermittently for a month. This current episode began  4 days ago, gradually worsening since that time. Patient denies diarrhea but string like stools.   Evaluation to date: none Treatment to date:  stool softner and exlax. I reviewed his lab results.  Vague abdominal pain   Off and on. I reviewed labs.      he has a problem list of   Patient Active Problem List   Diagnosis   • Essential hypertension   • Anxiety   • Impaired vision   • Impaired fasting glucose   • Blindness of both eyes   .  Since the last visit, he has overall felt well.  he has been compliant with   Current Outpatient Prescriptions:   •  acetaminophen (TYLENOL) 650 MG 8 hr tablet, Take 650 mg by mouth Every 8 (Eight) Hours As Needed for Mild Pain ., Disp: , Rfl:   •  aspirin 325 MG tablet, Take 650 mg by mouth Every 6 (Six) Hours. TO STOP PTS, Disp: , Rfl:   •  lisinopril (PRINIVIL,ZESTRIL) 20 MG tablet, Take 1 tablet by mouth Daily., Disp: 90 tablet, Rfl: 1  •  Probiotic Product (PROBIOTIC DAILY PO), Take  by mouth., Disp: , Rfl:   •  traMADol (ULTRAM) 50 MG tablet, Take 50 mg by mouth Every 6 (Six) Hours As Needed for moderate pain (4-6)., Disp: , Rfl: .  he denies medication side effects.    All of the chronic condition(s) listed above are stable w/o issues.    /72  Pulse 88  Temp 98.3 °F (36.8 °C) (Oral)   Resp 16  Ht 177.8 cm (70\")  Wt 106 kg (234 lb)  SpO2 96%  BMI 33.58 kg/m2    Results for orders placed or performed in visit on 01/22/18   Comprehensive metabolic panel   Result Value Ref Range    Glucose 108 (H) 65 - 99 mg/dL    BUN 14 8 - " 23 mg/dL    Creatinine 0.99 0.76 - 1.27 mg/dL    eGFR Non African Am 76 >60 mL/min/1.73    eGFR African Am 92 >60 mL/min/1.73    BUN/Creatinine Ratio 14.1 7.0 - 25.0    Sodium 140 136 - 145 mmol/L    Potassium 4.6 3.5 - 5.2 mmol/L    Chloride 100 98 - 107 mmol/L    Total CO2 23.5 22.0 - 29.0 mmol/L    Calcium 9.3 8.6 - 10.5 mg/dL    Total Protein 7.3 6.0 - 8.5 g/dL    Albumin 4.10 3.50 - 5.20 g/dL    Globulin 3.2 gm/dL    A/G Ratio 1.3 g/dL    Total Bilirubin 0.4 0.1 - 1.2 mg/dL    Alkaline Phosphatase 68 39 - 117 U/L    AST (SGOT) 17 1 - 40 U/L    ALT (SGPT) 12 1 - 41 U/L   Lipid panel   Result Value Ref Range    Total Cholesterol 147 0 - 200 mg/dL    Triglycerides 99 0 - 150 mg/dL    HDL Cholesterol 34 (L) 40 - 60 mg/dL    VLDL Cholesterol 19.8 5 - 40 mg/dL    LDL Cholesterol  93 0 - 100 mg/dL   TSH   Result Value Ref Range    TSH 1.290 0.270 - 4.200 mIU/mL   Hemoglobin A1c   Result Value Ref Range    Hemoglobin A1C 6.04 (H) 4.80 - 5.60 %   CBC and Differential   Result Value Ref Range    WBC 10.68 4.50 - 10.70 10*3/mm3    RBC 4.43 (L) 4.60 - 6.00 10*6/mm3    Hemoglobin 14.1 13.7 - 17.6 g/dL    Hematocrit 43.4 40.4 - 52.2 %    MCV 98.0 (H) 79.8 - 96.2 fL    MCH 31.8 27.0 - 32.7 pg    MCHC 32.5 (L) 32.6 - 36.4 g/dL    RDW 13.3 11.5 - 14.5 %    Platelets 318 140 - 500 10*3/mm3    Neutrophil Rel % 64.8 42.7 - 76.0 %    Lymphocyte Rel % 20.4 19.6 - 45.3 %    Monocyte Rel % 12.5 (H) 5.0 - 12.0 %    Eosinophil Rel % 1.9 0.3 - 6.2 %    Basophil Rel % 0.2 0.0 - 1.5 %    Neutrophils Absolute 6.92 1.90 - 8.10 10*3/mm3    Lymphocytes Absolute 2.18 0.90 - 4.80 10*3/mm3    Monocytes Absolute 1.34 (H) 0.20 - 1.20 10*3/mm3    Eosinophils Absolute 0.20 0.00 - 0.70 10*3/mm3    Basophils Absolute 0.02 0.00 - 0.20 10*3/mm3    Immature Granulocyte Rel % 0.2 0.0 - 0.5 %    Immature Grans Absolute 0.02 0.00 - 0.03 10*3/mm3           The following portions of the patient's history were reviewed and updated as appropriate: allergies,  current medications, past family history, past medical history, past social history, past surgical history and problem list.    Review of Systems   Constitutional: Negative for activity change, appetite change and unexpected weight change.   Eyes: Negative for visual disturbance.   Respiratory: Negative for chest tightness and shortness of breath.    Cardiovascular: Negative for chest pain and palpitations.   Gastrointestinal: Positive for abdominal pain, constipation and nausea. Negative for diarrhea.   Skin: Negative for color change.   Neurological: Negative for syncope and speech difficulty.   Psychiatric/Behavioral: Negative for confusion and decreased concentration.       Objective   Physical Exam   Constitutional: He is oriented to person, place, and time. He appears well-developed and well-nourished.   HENT:   Head: Normocephalic and atraumatic.   Mouth/Throat: Oropharynx is clear and moist.   Eyes: Conjunctivae and EOM are normal. Pupils are equal, round, and reactive to light.   Neck: Normal range of motion. Neck supple.   Cardiovascular: Normal rate and regular rhythm.    Pulmonary/Chest: Effort normal and breath sounds normal. No respiratory distress. He has no wheezes. He has no rales.   Abdominal: Soft. Bowel sounds are normal. He exhibits no distension and no mass. There is tenderness. There is no guarding.   Musculoskeletal: Normal range of motion.   Neurological: He is alert and oriented to person, place, and time.   Nursing note and vitals reviewed.      Assessment/Plan   Darryn was seen today for abdominal pain, abdominal cramping and nausea.    Diagnoses and all orders for this visit:    Abdominal pain, unspecified abdominal location  -     CT Abdomen Pelvis With Contrast  -     Urinalysis With Microscopic - Urine, Clean Catch    Abdominal cramping  -     CT Abdomen Pelvis With Contrast  -     Urinalysis With Microscopic - Urine, Clean Catch    Nausea  -     Urinalysis With Microscopic - Urine,  Clean Catch    Dizziness  -     Urinalysis With Microscopic - Urine, Clean Catch

## 2018-01-23 NOTE — PATIENT INSTRUCTIONS
Exercise 30 minutes most days of the week  Sleep 6-8 hours each night if possible  Low fat, low cholesterol diet   we discussed prescribed medications and how to take them   make sure you get results of any labs/studies ordered today  Low glycemic index diet     CT SCAN ABDOMEN IN AM   [facility]  AND U/A

## 2018-01-24 LAB
APPEARANCE UR: CLEAR
BACTERIA #/AREA URNS HPF: NORMAL /HPF
BILIRUB UR QL STRIP: NEGATIVE
COLOR UR: YELLOW
EPI CELLS #/AREA URNS HPF: NORMAL /HPF
GLUCOSE UR QL: NEGATIVE
HGB UR QL STRIP: NEGATIVE
KETONES UR QL STRIP: NEGATIVE
LEUKOCYTE ESTERASE UR QL STRIP: NEGATIVE
NITRITE UR QL STRIP: NEGATIVE
PH UR STRIP: 5.5 [PH] (ref 5–8)
PROT UR QL STRIP: (no result)
RBC #/AREA URNS HPF: NORMAL /HPF
SP GR UR: (no result) (ref 1–1.03)
UROBILINOGEN UR STRIP-MCNC: (no result) MG/DL
WBC #/AREA URNS HPF: NORMAL /HPF

## 2018-01-25 ENCOUNTER — HOSPITAL ENCOUNTER (OUTPATIENT)
Dept: CT IMAGING | Facility: HOSPITAL | Age: 63
Discharge: HOME | End: 2018-01-25
Admitting: FAMILY MEDICINE

## 2018-01-25 LAB — CREAT BLDA-MCNC: 0.9 MG/DL (ref 0.6–1.3)

## 2018-01-25 PROCEDURE — 82565 ASSAY OF CREATININE: CPT

## 2018-01-25 PROCEDURE — 0 IOPAMIDOL 61 % SOLUTION: Performed by: FAMILY MEDICINE

## 2018-01-25 PROCEDURE — 74177 CT ABD & PELVIS W/CONTRAST: CPT

## 2018-01-25 RX ADMIN — IOPAMIDOL 85 ML: 612 INJECTION, SOLUTION INTRAVENOUS at 20:05

## 2018-01-26 ENCOUNTER — HOSPITAL ENCOUNTER (INPATIENT)
Facility: HOSPITAL | Age: 63
LOS: 3 days | Discharge: HOME | End: 2018-01-29
Attending: SURGERY | Admitting: SURGERY

## 2018-01-26 PROBLEM — K57.80 DIVERTICULITIS OF INTESTINE WITH PERFORATION AND ABSCESS: Status: ACTIVE | Noted: 2018-01-26

## 2018-01-26 PROCEDURE — 25010000002 PIPERACILLIN SOD-TAZOBACTAM PER 1 G: Performed by: SURGERY

## 2018-01-26 RX ORDER — SODIUM CHLORIDE, SODIUM LACTATE, POTASSIUM CHLORIDE, CALCIUM CHLORIDE 600; 310; 30; 20 MG/100ML; MG/100ML; MG/100ML; MG/100ML
50 INJECTION, SOLUTION INTRAVENOUS CONTINUOUS
Status: DISCONTINUED | OUTPATIENT
Start: 2018-01-26 | End: 2018-01-29 | Stop reason: HOSPADM

## 2018-01-26 RX ORDER — SODIUM CHLORIDE 0.9 % (FLUSH) 0.9 %
1-10 SYRINGE (ML) INJECTION AS NEEDED
Status: DISCONTINUED | OUTPATIENT
Start: 2018-01-26 | End: 2018-01-29 | Stop reason: HOSPADM

## 2018-01-26 RX ORDER — ONDANSETRON 4 MG/1
4 TABLET, FILM COATED ORAL EVERY 6 HOURS PRN
Status: DISCONTINUED | OUTPATIENT
Start: 2018-01-26 | End: 2018-01-29 | Stop reason: HOSPADM

## 2018-01-26 RX ORDER — ACETAMINOPHEN 325 MG/1
650 TABLET ORAL EVERY 4 HOURS PRN
Status: DISCONTINUED | OUTPATIENT
Start: 2018-01-26 | End: 2018-01-29 | Stop reason: HOSPADM

## 2018-01-26 RX ORDER — ONDANSETRON 4 MG/1
4 TABLET, ORALLY DISINTEGRATING ORAL EVERY 6 HOURS PRN
Status: DISCONTINUED | OUTPATIENT
Start: 2018-01-26 | End: 2018-01-29 | Stop reason: HOSPADM

## 2018-01-26 RX ORDER — HYDROMORPHONE HYDROCHLORIDE 1 MG/ML
0.5 INJECTION, SOLUTION INTRAMUSCULAR; INTRAVENOUS; SUBCUTANEOUS
Status: DISCONTINUED | OUTPATIENT
Start: 2018-01-26 | End: 2018-01-28

## 2018-01-26 RX ORDER — ONDANSETRON 2 MG/ML
4 INJECTION INTRAMUSCULAR; INTRAVENOUS EVERY 6 HOURS PRN
Status: DISCONTINUED | OUTPATIENT
Start: 2018-01-26 | End: 2018-01-29 | Stop reason: HOSPADM

## 2018-01-26 RX ORDER — NALOXONE HCL 0.4 MG/ML
0.4 VIAL (ML) INJECTION
Status: DISCONTINUED | OUTPATIENT
Start: 2018-01-26 | End: 2018-01-28

## 2018-01-26 RX ADMIN — SODIUM CHLORIDE, POTASSIUM CHLORIDE, SODIUM LACTATE AND CALCIUM CHLORIDE 150 ML/HR: 600; 310; 30; 20 INJECTION, SOLUTION INTRAVENOUS at 22:29

## 2018-01-26 RX ADMIN — TAZOBACTAM SODIUM AND PIPERACILLIN SODIUM 3.38 G: 375; 3 INJECTION, SOLUTION INTRAVENOUS at 23:36

## 2018-01-26 RX ADMIN — HYDROMORPHONE HYDROCHLORIDE 0.5 MG: 10 INJECTION INTRAMUSCULAR; INTRAVENOUS; SUBCUTANEOUS at 23:21

## 2018-01-27 LAB
ANION GAP SERPL CALCULATED.3IONS-SCNC: 12.1 MMOL/L
BASOPHILS # BLD AUTO: 0.02 10*3/MM3 (ref 0–0.2)
BASOPHILS NFR BLD AUTO: 0.2 % (ref 0–1.5)
BUN BLD-MCNC: 12 MG/DL (ref 8–23)
BUN/CREAT SERPL: 15 (ref 7–25)
CALCIUM SPEC-SCNC: 8.6 MG/DL (ref 8.6–10.5)
CHLORIDE SERPL-SCNC: 102 MMOL/L (ref 98–107)
CO2 SERPL-SCNC: 23.9 MMOL/L (ref 22–29)
CREAT BLD-MCNC: 0.8 MG/DL (ref 0.76–1.27)
DEPRECATED RDW RBC AUTO: 45.4 FL (ref 37–54)
EOSINOPHIL # BLD AUTO: 0.32 10*3/MM3 (ref 0–0.7)
EOSINOPHIL NFR BLD AUTO: 2.5 % (ref 0.3–6.2)
ERYTHROCYTE [DISTWIDTH] IN BLOOD BY AUTOMATED COUNT: 13 % (ref 11.5–14.5)
GFR SERPL CREATININE-BSD FRML MDRD: 97 ML/MIN/1.73
GLUCOSE BLD-MCNC: 113 MG/DL (ref 65–99)
GLUCOSE BLDC GLUCOMTR-MCNC: 102 MG/DL (ref 70–130)
HCT VFR BLD AUTO: 36.9 % (ref 40.4–52.2)
HGB BLD-MCNC: 12.1 G/DL (ref 13.7–17.6)
IMM GRANULOCYTES # BLD: 0.03 10*3/MM3 (ref 0–0.03)
IMM GRANULOCYTES NFR BLD: 0.2 % (ref 0–0.5)
LYMPHOCYTES # BLD AUTO: 2.47 10*3/MM3 (ref 0.9–4.8)
LYMPHOCYTES NFR BLD AUTO: 19.1 % (ref 19.6–45.3)
MCH RBC QN AUTO: 31.8 PG (ref 27–32.7)
MCHC RBC AUTO-ENTMCNC: 32.8 G/DL (ref 32.6–36.4)
MCV RBC AUTO: 96.9 FL (ref 79.8–96.2)
MONOCYTES # BLD AUTO: 1.44 10*3/MM3 (ref 0.2–1.2)
MONOCYTES NFR BLD AUTO: 11.2 % (ref 5–12)
NEUTROPHILS # BLD AUTO: 8.63 10*3/MM3 (ref 1.9–8.1)
NEUTROPHILS NFR BLD AUTO: 66.8 % (ref 42.7–76)
PLATELET # BLD AUTO: 266 10*3/MM3 (ref 140–500)
PMV BLD AUTO: 9.4 FL (ref 6–12)
POTASSIUM BLD-SCNC: 4.2 MMOL/L (ref 3.5–5.2)
RBC # BLD AUTO: 3.81 10*6/MM3 (ref 4.6–6)
SODIUM BLD-SCNC: 138 MMOL/L (ref 136–145)
WBC NRBC COR # BLD: 12.91 10*3/MM3 (ref 4.5–10.7)

## 2018-01-27 PROCEDURE — 25010000002 PIPERACILLIN SOD-TAZOBACTAM PER 1 G: Performed by: SURGERY

## 2018-01-27 PROCEDURE — 85025 COMPLETE CBC W/AUTO DIFF WBC: CPT | Performed by: SURGERY

## 2018-01-27 PROCEDURE — 82962 GLUCOSE BLOOD TEST: CPT

## 2018-01-27 PROCEDURE — 25010000002 ONDANSETRON PER 1 MG: Performed by: SURGERY

## 2018-01-27 PROCEDURE — 80048 BASIC METABOLIC PNL TOTAL CA: CPT | Performed by: SURGERY

## 2018-01-27 RX ADMIN — ONDANSETRON 4 MG: 2 INJECTION INTRAMUSCULAR; INTRAVENOUS at 10:45

## 2018-01-27 RX ADMIN — HYDROMORPHONE HYDROCHLORIDE 0.5 MG: 10 INJECTION INTRAMUSCULAR; INTRAVENOUS; SUBCUTANEOUS at 04:07

## 2018-01-27 RX ADMIN — FAMOTIDINE 20 MG: 10 INJECTION INTRAVENOUS at 21:08

## 2018-01-27 RX ADMIN — HYDROMORPHONE HYDROCHLORIDE 0.5 MG: 10 INJECTION INTRAMUSCULAR; INTRAVENOUS; SUBCUTANEOUS at 12:54

## 2018-01-27 RX ADMIN — HYDROMORPHONE HYDROCHLORIDE 0.5 MG: 10 INJECTION INTRAMUSCULAR; INTRAVENOUS; SUBCUTANEOUS at 16:51

## 2018-01-27 RX ADMIN — TAZOBACTAM SODIUM AND PIPERACILLIN SODIUM 3.38 G: 375; 3 INJECTION, SOLUTION INTRAVENOUS at 14:47

## 2018-01-27 RX ADMIN — SODIUM CHLORIDE, POTASSIUM CHLORIDE, SODIUM LACTATE AND CALCIUM CHLORIDE 100 ML/HR: 600; 310; 30; 20 INJECTION, SOLUTION INTRAVENOUS at 11:44

## 2018-01-27 RX ADMIN — FAMOTIDINE 20 MG: 10 INJECTION INTRAVENOUS at 01:25

## 2018-01-27 RX ADMIN — TAZOBACTAM SODIUM AND PIPERACILLIN SODIUM 3.38 G: 375; 3 INJECTION, SOLUTION INTRAVENOUS at 21:15

## 2018-01-27 RX ADMIN — TAZOBACTAM SODIUM AND PIPERACILLIN SODIUM 3.38 G: 375; 3 INJECTION, SOLUTION INTRAVENOUS at 05:25

## 2018-01-27 RX ADMIN — HYDROMORPHONE HYDROCHLORIDE 0.5 MG: 10 INJECTION INTRAMUSCULAR; INTRAVENOUS; SUBCUTANEOUS at 18:51

## 2018-01-27 RX ADMIN — HYDROMORPHONE HYDROCHLORIDE 0.5 MG: 10 INJECTION INTRAMUSCULAR; INTRAVENOUS; SUBCUTANEOUS at 14:51

## 2018-01-27 RX ADMIN — SODIUM CHLORIDE, POTASSIUM CHLORIDE, SODIUM LACTATE AND CALCIUM CHLORIDE 150 ML/HR: 600; 310; 30; 20 INJECTION, SOLUTION INTRAVENOUS at 04:07

## 2018-01-27 RX ADMIN — HYDROMORPHONE HYDROCHLORIDE 0.5 MG: 10 INJECTION INTRAMUSCULAR; INTRAVENOUS; SUBCUTANEOUS at 10:41

## 2018-01-27 RX ADMIN — HYDROMORPHONE HYDROCHLORIDE 0.5 MG: 10 INJECTION INTRAMUSCULAR; INTRAVENOUS; SUBCUTANEOUS at 21:07

## 2018-01-27 RX ADMIN — FAMOTIDINE 20 MG: 10 INJECTION INTRAVENOUS at 08:22

## 2018-01-27 RX ADMIN — HYDROMORPHONE HYDROCHLORIDE 0.5 MG: 10 INJECTION INTRAMUSCULAR; INTRAVENOUS; SUBCUTANEOUS at 08:22

## 2018-01-28 PROCEDURE — 25010000002 PIPERACILLIN SOD-TAZOBACTAM PER 1 G: Performed by: SURGERY

## 2018-01-28 RX ORDER — HYDROMORPHONE HYDROCHLORIDE 1 MG/ML
0.5 INJECTION, SOLUTION INTRAMUSCULAR; INTRAVENOUS; SUBCUTANEOUS EVERY 4 HOURS PRN
Status: DISCONTINUED | OUTPATIENT
Start: 2018-01-28 | End: 2018-01-29

## 2018-01-28 RX ORDER — PANTOPRAZOLE SODIUM 40 MG/1
40 TABLET, DELAYED RELEASE ORAL
Status: DISCONTINUED | OUTPATIENT
Start: 2018-01-28 | End: 2018-01-29 | Stop reason: HOSPADM

## 2018-01-28 RX ORDER — NALOXONE HCL 0.4 MG/ML
0.4 VIAL (ML) INJECTION
Status: DISCONTINUED | OUTPATIENT
Start: 2018-01-28 | End: 2018-01-29

## 2018-01-28 RX ORDER — SACCHAROMYCES BOULARDII 250 MG
250 CAPSULE ORAL 2 TIMES DAILY
Status: DISCONTINUED | OUTPATIENT
Start: 2018-01-28 | End: 2018-01-29 | Stop reason: HOSPADM

## 2018-01-28 RX ORDER — HYDROCODONE BITARTRATE AND ACETAMINOPHEN 7.5; 325 MG/1; MG/1
2 TABLET ORAL EVERY 4 HOURS PRN
Status: DISCONTINUED | OUTPATIENT
Start: 2018-01-28 | End: 2018-01-29 | Stop reason: HOSPADM

## 2018-01-28 RX ORDER — HYDROCODONE BITARTRATE AND ACETAMINOPHEN 7.5; 325 MG/1; MG/1
1 TABLET ORAL EVERY 4 HOURS PRN
Status: DISCONTINUED | OUTPATIENT
Start: 2018-01-28 | End: 2018-01-29 | Stop reason: HOSPADM

## 2018-01-28 RX ADMIN — HYDROMORPHONE HYDROCHLORIDE 0.5 MG: 10 INJECTION INTRAMUSCULAR; INTRAVENOUS; SUBCUTANEOUS at 14:29

## 2018-01-28 RX ADMIN — Medication 250 MG: at 21:14

## 2018-01-28 RX ADMIN — PANTOPRAZOLE SODIUM 40 MG: 40 TABLET, DELAYED RELEASE ORAL at 11:07

## 2018-01-28 RX ADMIN — FAMOTIDINE 20 MG: 10 INJECTION INTRAVENOUS at 08:00

## 2018-01-28 RX ADMIN — HYDROCODONE BITARTRATE AND ACETAMINOPHEN 1 TABLET: 7.5; 325 TABLET ORAL at 17:48

## 2018-01-28 RX ADMIN — TAZOBACTAM SODIUM AND PIPERACILLIN SODIUM 3.38 G: 375; 3 INJECTION, SOLUTION INTRAVENOUS at 05:53

## 2018-01-28 RX ADMIN — TAZOBACTAM SODIUM AND PIPERACILLIN SODIUM 3.38 G: 375; 3 INJECTION, SOLUTION INTRAVENOUS at 14:22

## 2018-01-28 RX ADMIN — HYDROCODONE BITARTRATE AND ACETAMINOPHEN 1 TABLET: 7.5; 325 TABLET ORAL at 12:48

## 2018-01-28 RX ADMIN — Medication 250 MG: at 11:07

## 2018-01-28 RX ADMIN — TAZOBACTAM SODIUM AND PIPERACILLIN SODIUM 3.38 G: 375; 3 INJECTION, SOLUTION INTRAVENOUS at 21:14

## 2018-01-28 RX ADMIN — HYDROMORPHONE HYDROCHLORIDE 0.5 MG: 10 INJECTION INTRAMUSCULAR; INTRAVENOUS; SUBCUTANEOUS at 10:15

## 2018-01-28 RX ADMIN — HYDROMORPHONE HYDROCHLORIDE 0.5 MG: 10 INJECTION INTRAMUSCULAR; INTRAVENOUS; SUBCUTANEOUS at 21:14

## 2018-01-28 RX ADMIN — TAZOBACTAM SODIUM AND PIPERACILLIN SODIUM 3.38 G: 375; 3 INJECTION, SOLUTION INTRAVENOUS at 22:15

## 2018-01-28 RX ADMIN — SODIUM CHLORIDE, POTASSIUM CHLORIDE, SODIUM LACTATE AND CALCIUM CHLORIDE 100 ML/HR: 600; 310; 30; 20 INJECTION, SOLUTION INTRAVENOUS at 07:53

## 2018-01-29 VITALS
HEIGHT: 70 IN | TEMPERATURE: 98.1 F | DIASTOLIC BLOOD PRESSURE: 75 MMHG | RESPIRATION RATE: 18 BRPM | WEIGHT: 235.01 LBS | HEART RATE: 73 BPM | SYSTOLIC BLOOD PRESSURE: 120 MMHG | OXYGEN SATURATION: 96 % | BODY MASS INDEX: 33.64 KG/M2

## 2018-01-29 LAB
BASOPHILS # BLD AUTO: 0.02 10*3/MM3 (ref 0–0.2)
BASOPHILS NFR BLD AUTO: 0.2 % (ref 0–1.5)
DEPRECATED RDW RBC AUTO: 44.7 FL (ref 37–54)
EOSINOPHIL # BLD AUTO: 0.4 10*3/MM3 (ref 0–0.7)
EOSINOPHIL NFR BLD AUTO: 4.5 % (ref 0.3–6.2)
ERYTHROCYTE [DISTWIDTH] IN BLOOD BY AUTOMATED COUNT: 13 % (ref 11.5–14.5)
HCT VFR BLD AUTO: 33.5 % (ref 40.4–52.2)
HGB BLD-MCNC: 11.1 G/DL (ref 13.7–17.6)
IMM GRANULOCYTES # BLD: 0.03 10*3/MM3 (ref 0–0.03)
IMM GRANULOCYTES NFR BLD: 0.3 % (ref 0–0.5)
LYMPHOCYTES # BLD AUTO: 2.03 10*3/MM3 (ref 0.9–4.8)
LYMPHOCYTES NFR BLD AUTO: 22.7 % (ref 19.6–45.3)
MCH RBC QN AUTO: 31.6 PG (ref 27–32.7)
MCHC RBC AUTO-ENTMCNC: 33.1 G/DL (ref 32.6–36.4)
MCV RBC AUTO: 95.4 FL (ref 79.8–96.2)
MONOCYTES # BLD AUTO: 1.19 10*3/MM3 (ref 0.2–1.2)
MONOCYTES NFR BLD AUTO: 13.3 % (ref 5–12)
NEUTROPHILS # BLD AUTO: 5.26 10*3/MM3 (ref 1.9–8.1)
NEUTROPHILS NFR BLD AUTO: 59 % (ref 42.7–76)
PLATELET # BLD AUTO: 248 10*3/MM3 (ref 140–500)
PMV BLD AUTO: 9.3 FL (ref 6–12)
RBC # BLD AUTO: 3.51 10*6/MM3 (ref 4.6–6)
WBC NRBC COR # BLD: 8.93 10*3/MM3 (ref 4.5–10.7)

## 2018-01-29 PROCEDURE — 25010000002 PIPERACILLIN SOD-TAZOBACTAM PER 1 G: Performed by: SURGERY

## 2018-01-29 PROCEDURE — 85025 COMPLETE CBC W/AUTO DIFF WBC: CPT | Performed by: SURGERY

## 2018-01-29 RX ORDER — HYDROCODONE BITARTRATE AND ACETAMINOPHEN 7.5; 325 MG/1; MG/1
1-2 TABLET ORAL EVERY 4 HOURS PRN
Qty: 30 TABLET | Refills: 0 | Status: SHIPPED | OUTPATIENT
Start: 2018-01-29 | End: 2018-01-29

## 2018-01-29 RX ORDER — SACCHAROMYCES BOULARDII 250 MG
250 CAPSULE ORAL 2 TIMES DAILY
Qty: 60 CAPSULE | Refills: 1 | Status: SHIPPED | OUTPATIENT
Start: 2018-01-29 | End: 2018-02-28

## 2018-01-29 RX ORDER — HYDROCODONE BITARTRATE AND ACETAMINOPHEN 7.5; 325 MG/1; MG/1
1-2 TABLET ORAL EVERY 4 HOURS PRN
Qty: 30 TABLET | Refills: 0 | Status: SHIPPED | OUTPATIENT
Start: 2018-01-29 | End: 2018-02-28

## 2018-01-29 RX ORDER — AMOXICILLIN AND CLAVULANATE POTASSIUM 500; 125 MG/1; MG/1
1 TABLET, FILM COATED ORAL 3 TIMES DAILY
Qty: 30 TABLET | Refills: 0 | Status: SHIPPED | OUTPATIENT
Start: 2018-01-29 | End: 2018-01-29

## 2018-01-29 RX ORDER — METRONIDAZOLE 500 MG/1
500 TABLET ORAL 3 TIMES DAILY
Qty: 30 TABLET | Refills: 0 | Status: SHIPPED | OUTPATIENT
Start: 2018-01-29 | End: 2018-02-08

## 2018-01-29 RX ORDER — METRONIDAZOLE 500 MG/1
500 TABLET ORAL 3 TIMES DAILY
Qty: 30 TABLET | Refills: 0 | Status: SHIPPED | OUTPATIENT
Start: 2018-01-29 | End: 2018-01-29

## 2018-01-29 RX ORDER — SACCHAROMYCES BOULARDII 250 MG
250 CAPSULE ORAL 2 TIMES DAILY
Qty: 60 CAPSULE | Refills: 1 | Status: SHIPPED | OUTPATIENT
Start: 2018-01-29 | End: 2018-01-29

## 2018-01-29 RX ORDER — AMOXICILLIN AND CLAVULANATE POTASSIUM 500; 125 MG/1; MG/1
1 TABLET, FILM COATED ORAL 3 TIMES DAILY
Qty: 30 TABLET | Refills: 0 | Status: SHIPPED | OUTPATIENT
Start: 2018-01-29 | End: 2018-02-08

## 2018-01-29 RX ADMIN — TAZOBACTAM SODIUM AND PIPERACILLIN SODIUM 3.38 G: 375; 3 INJECTION, SOLUTION INTRAVENOUS at 05:57

## 2018-01-29 RX ADMIN — PANTOPRAZOLE SODIUM 40 MG: 40 TABLET, DELAYED RELEASE ORAL at 05:57

## 2018-01-29 RX ADMIN — HYDROCODONE BITARTRATE AND ACETAMINOPHEN 1 TABLET: 7.5; 325 TABLET ORAL at 05:57

## 2018-01-29 RX ADMIN — SODIUM CHLORIDE, POTASSIUM CHLORIDE, SODIUM LACTATE AND CALCIUM CHLORIDE 50 ML/HR: 600; 310; 30; 20 INJECTION, SOLUTION INTRAVENOUS at 05:57

## 2018-01-29 RX ADMIN — Medication 250 MG: at 09:28

## 2018-01-31 ENCOUNTER — EPISODE CHANGES (OUTPATIENT)
Dept: CASE MANAGEMENT | Facility: OTHER | Age: 63
End: 2018-01-31

## 2018-03-06 ENCOUNTER — PREP FOR SURGERY (OUTPATIENT)
Dept: OTHER | Facility: HOSPITAL | Age: 63
End: 2018-03-06

## 2018-03-06 DIAGNOSIS — K57.20 DIVERTICULITIS OF LARGE INTESTINE WITH PERFORATION AND ABSCESS WITHOUT BLEEDING: Primary | ICD-10-CM

## 2018-03-07 PROBLEM — K57.20 DIVERTICULITIS OF LARGE INTESTINE WITH PERFORATION AND ABSCESS WITHOUT BLEEDING: Status: ACTIVE | Noted: 2018-03-07

## 2018-03-16 DIAGNOSIS — I10 ESSENTIAL HYPERTENSION: ICD-10-CM

## 2018-03-16 RX ORDER — LISINOPRIL 20 MG/1
20 TABLET ORAL DAILY
Qty: 30 TABLET | Refills: 0 | Status: SHIPPED | OUTPATIENT
Start: 2018-03-16 | End: 2018-05-18 | Stop reason: SDUPTHER

## 2018-03-26 ENCOUNTER — APPOINTMENT (OUTPATIENT)
Dept: PREADMISSION TESTING | Facility: HOSPITAL | Age: 63
End: 2018-03-26

## 2018-03-26 VITALS
BODY MASS INDEX: 35.34 KG/M2 | RESPIRATION RATE: 16 BRPM | HEIGHT: 69 IN | SYSTOLIC BLOOD PRESSURE: 133 MMHG | WEIGHT: 238.6 LBS | DIASTOLIC BLOOD PRESSURE: 72 MMHG | TEMPERATURE: 98.1 F | OXYGEN SATURATION: 99 % | HEART RATE: 71 BPM

## 2018-03-26 LAB
ANION GAP SERPL CALCULATED.3IONS-SCNC: 12.9 MMOL/L
BUN BLD-MCNC: 15 MG/DL (ref 8–23)
BUN/CREAT SERPL: 19 (ref 7–25)
CALCIUM SPEC-SCNC: 9.2 MG/DL (ref 8.6–10.5)
CHLORIDE SERPL-SCNC: 103 MMOL/L (ref 98–107)
CO2 SERPL-SCNC: 25.1 MMOL/L (ref 22–29)
CREAT BLD-MCNC: 0.79 MG/DL (ref 0.76–1.27)
DEPRECATED RDW RBC AUTO: 49.3 FL (ref 37–54)
ERYTHROCYTE [DISTWIDTH] IN BLOOD BY AUTOMATED COUNT: 13.6 % (ref 11.5–14.5)
GFR SERPL CREATININE-BSD FRML MDRD: 98 ML/MIN/1.73
GLUCOSE BLD-MCNC: 99 MG/DL (ref 65–99)
HCT VFR BLD AUTO: 43.1 % (ref 40.4–52.2)
HGB BLD-MCNC: 13.7 G/DL (ref 13.7–17.6)
MCH RBC QN AUTO: 31.6 PG (ref 27–32.7)
MCHC RBC AUTO-ENTMCNC: 31.8 G/DL (ref 32.6–36.4)
MCV RBC AUTO: 99.5 FL (ref 79.8–96.2)
PLATELET # BLD AUTO: 245 10*3/MM3 (ref 140–500)
PMV BLD AUTO: 9.8 FL (ref 6–12)
POTASSIUM BLD-SCNC: 5.2 MMOL/L (ref 3.5–5.2)
RBC # BLD AUTO: 4.33 10*6/MM3 (ref 4.6–6)
SODIUM BLD-SCNC: 141 MMOL/L (ref 136–145)
WBC NRBC COR # BLD: 8.59 10*3/MM3 (ref 4.5–10.7)

## 2018-03-26 PROCEDURE — 85027 COMPLETE CBC AUTOMATED: CPT | Performed by: SURGERY

## 2018-03-26 PROCEDURE — 93005 ELECTROCARDIOGRAM TRACING: CPT

## 2018-03-26 PROCEDURE — 93010 ELECTROCARDIOGRAM REPORT: CPT | Performed by: INTERNAL MEDICINE

## 2018-03-26 PROCEDURE — 80048 BASIC METABOLIC PNL TOTAL CA: CPT | Performed by: SURGERY

## 2018-03-26 PROCEDURE — 36415 COLL VENOUS BLD VENIPUNCTURE: CPT

## 2018-03-26 RX ORDER — ASCORBIC ACID 500 MG
1000 TABLET ORAL DAILY
COMMUNITY
End: 2021-10-04

## 2018-03-26 RX ORDER — ERYTHROMYCIN 500 MG/1
500 TABLET, DELAYED RELEASE ORAL SEE ADMIN INSTRUCTIONS
COMMUNITY
End: 2018-04-01 | Stop reason: HOSPADM

## 2018-03-26 RX ORDER — IBUPROFEN 200 MG
800 TABLET ORAL EVERY 6 HOURS PRN
COMMUNITY
End: 2020-07-14 | Stop reason: HOSPADM

## 2018-03-26 RX ORDER — SACCHAROMYCES BOULARDII 250 MG
250 CAPSULE ORAL 2 TIMES DAILY
COMMUNITY
End: 2018-05-29

## 2018-03-26 RX ORDER — METRONIDAZOLE 500 MG/1
500 TABLET ORAL SEE ADMIN INSTRUCTIONS
COMMUNITY
End: 2018-04-01 | Stop reason: HOSPADM

## 2018-03-29 ENCOUNTER — ANESTHESIA EVENT (OUTPATIENT)
Dept: PERIOP | Facility: HOSPITAL | Age: 63
End: 2018-03-29

## 2018-03-29 ENCOUNTER — HOSPITAL ENCOUNTER (INPATIENT)
Facility: HOSPITAL | Age: 63
LOS: 3 days | Discharge: HOME | End: 2018-04-01
Attending: SURGERY | Admitting: SURGERY

## 2018-03-29 ENCOUNTER — ANESTHESIA (OUTPATIENT)
Dept: PERIOP | Facility: HOSPITAL | Age: 63
End: 2018-03-29

## 2018-03-29 DIAGNOSIS — K57.20 DIVERTICULITIS OF LARGE INTESTINE WITH PERFORATION AND ABSCESS WITHOUT BLEEDING: ICD-10-CM

## 2018-03-29 LAB — POTASSIUM BLD-SCNC: 4.3 MMOL/L (ref 3.5–5.2)

## 2018-03-29 PROCEDURE — 25010000002 ONDANSETRON PER 1 MG: Performed by: NURSE ANESTHETIST, CERTIFIED REGISTERED

## 2018-03-29 PROCEDURE — 88307 TISSUE EXAM BY PATHOLOGIST: CPT | Performed by: SURGERY

## 2018-03-29 PROCEDURE — 25010000002 MIDAZOLAM PER 1 MG: Performed by: ANESTHESIOLOGY

## 2018-03-29 PROCEDURE — 25010000002 METOCLOPRAMIDE PER 10 MG: Performed by: SURGERY

## 2018-03-29 PROCEDURE — 25010000002 CEFOXITIN PER 1 G: Performed by: SURGERY

## 2018-03-29 PROCEDURE — 0DBN0ZZ EXCISION OF SIGMOID COLON, OPEN APPROACH: ICD-10-PCS | Performed by: SURGERY

## 2018-03-29 PROCEDURE — 94640 AIRWAY INHALATION TREATMENT: CPT

## 2018-03-29 PROCEDURE — 25010000002 FENTANYL CITRATE (PF) 100 MCG/2ML SOLUTION: Performed by: NURSE ANESTHETIST, CERTIFIED REGISTERED

## 2018-03-29 PROCEDURE — 0DBP0ZZ EXCISION OF RECTUM, OPEN APPROACH: ICD-10-PCS | Performed by: SURGERY

## 2018-03-29 PROCEDURE — 25010000002 HYDROMORPHONE PER 4 MG: Performed by: NURSE ANESTHETIST, CERTIFIED REGISTERED

## 2018-03-29 PROCEDURE — 25010000002 FENTANYL CITRATE (PF) 100 MCG/2ML SOLUTION: Performed by: ANESTHESIOLOGY

## 2018-03-29 PROCEDURE — 25010000002 KETOROLAC TROMETHAMINE PER 15 MG: Performed by: NURSE ANESTHETIST, CERTIFIED REGISTERED

## 2018-03-29 PROCEDURE — 84132 ASSAY OF SERUM POTASSIUM: CPT | Performed by: SURGERY

## 2018-03-29 PROCEDURE — 25010000002 PROPOFOL 10 MG/ML EMULSION: Performed by: NURSE ANESTHETIST, CERTIFIED REGISTERED

## 2018-03-29 PROCEDURE — 25010000002 DEXAMETHASONE PER 1 MG: Performed by: NURSE ANESTHETIST, CERTIFIED REGISTERED

## 2018-03-29 DEVICE — SEALANT WND FIBRIN TISSEEL VAPOR/HEAT/PREFIL/SYR 10ML: Type: IMPLANTABLE DEVICE | Site: ABDOMEN | Status: FUNCTIONAL

## 2018-03-29 RX ORDER — SODIUM CHLORIDE 0.9 % (FLUSH) 0.9 %
1-10 SYRINGE (ML) INJECTION AS NEEDED
Status: DISCONTINUED | OUTPATIENT
Start: 2018-03-29 | End: 2018-03-29 | Stop reason: HOSPADM

## 2018-03-29 RX ORDER — NALOXONE HCL 0.4 MG/ML
0.2 VIAL (ML) INJECTION AS NEEDED
Status: DISCONTINUED | OUTPATIENT
Start: 2018-03-29 | End: 2018-03-29 | Stop reason: HOSPADM

## 2018-03-29 RX ORDER — EPHEDRINE SULFATE 50 MG/ML
5 INJECTION, SOLUTION INTRAVENOUS ONCE AS NEEDED
Status: DISCONTINUED | OUTPATIENT
Start: 2018-03-29 | End: 2018-03-29 | Stop reason: HOSPADM

## 2018-03-29 RX ORDER — HYDROMORPHONE HCL 110MG/55ML
0.25 PATIENT CONTROLLED ANALGESIA SYRINGE INTRAVENOUS
Status: DISCONTINUED | OUTPATIENT
Start: 2018-03-29 | End: 2018-03-29 | Stop reason: HOSPADM

## 2018-03-29 RX ORDER — MIDAZOLAM HYDROCHLORIDE 1 MG/ML
1 INJECTION INTRAMUSCULAR; INTRAVENOUS
Status: DISCONTINUED | OUTPATIENT
Start: 2018-03-29 | End: 2018-03-29 | Stop reason: HOSPADM

## 2018-03-29 RX ORDER — PROMETHAZINE HYDROCHLORIDE 25 MG/ML
12.5 INJECTION, SOLUTION INTRAMUSCULAR; INTRAVENOUS EVERY 4 HOURS PRN
Status: DISCONTINUED | OUTPATIENT
Start: 2018-03-29 | End: 2018-04-01 | Stop reason: HOSPADM

## 2018-03-29 RX ORDER — SODIUM CHLORIDE, SODIUM LACTATE, POTASSIUM CHLORIDE, CALCIUM CHLORIDE 600; 310; 30; 20 MG/100ML; MG/100ML; MG/100ML; MG/100ML
100 INJECTION, SOLUTION INTRAVENOUS CONTINUOUS
Status: DISCONTINUED | OUTPATIENT
Start: 2018-03-29 | End: 2018-03-31

## 2018-03-29 RX ORDER — FAMOTIDINE 10 MG/ML
20 INJECTION, SOLUTION INTRAVENOUS ONCE
Status: COMPLETED | OUTPATIENT
Start: 2018-03-29 | End: 2018-03-29

## 2018-03-29 RX ORDER — LIDOCAINE HYDROCHLORIDE 20 MG/ML
INJECTION, SOLUTION INFILTRATION; PERINEURAL AS NEEDED
Status: DISCONTINUED | OUTPATIENT
Start: 2018-03-29 | End: 2018-03-29 | Stop reason: SURG

## 2018-03-29 RX ORDER — PROMETHAZINE HYDROCHLORIDE 25 MG/1
12.5 TABLET ORAL ONCE AS NEEDED
Status: DISCONTINUED | OUTPATIENT
Start: 2018-03-29 | End: 2018-03-29 | Stop reason: HOSPADM

## 2018-03-29 RX ORDER — MEPERIDINE HYDROCHLORIDE 25 MG/ML
12.5 INJECTION INTRAMUSCULAR; INTRAVENOUS; SUBCUTANEOUS
Status: DISCONTINUED | OUTPATIENT
Start: 2018-03-29 | End: 2018-03-29 | Stop reason: HOSPADM

## 2018-03-29 RX ORDER — HYDRALAZINE HYDROCHLORIDE 20 MG/ML
5 INJECTION INTRAMUSCULAR; INTRAVENOUS
Status: DISCONTINUED | OUTPATIENT
Start: 2018-03-29 | End: 2018-03-29 | Stop reason: HOSPADM

## 2018-03-29 RX ORDER — SODIUM CHLORIDE, SODIUM LACTATE, POTASSIUM CHLORIDE, CALCIUM CHLORIDE 600; 310; 30; 20 MG/100ML; MG/100ML; MG/100ML; MG/100ML
9 INJECTION, SOLUTION INTRAVENOUS CONTINUOUS
Status: DISCONTINUED | OUTPATIENT
Start: 2018-03-29 | End: 2018-04-01 | Stop reason: HOSPADM

## 2018-03-29 RX ORDER — DIPHENHYDRAMINE HYDROCHLORIDE 50 MG/ML
12.5 INJECTION INTRAMUSCULAR; INTRAVENOUS
Status: DISCONTINUED | OUTPATIENT
Start: 2018-03-29 | End: 2018-03-29 | Stop reason: HOSPADM

## 2018-03-29 RX ORDER — HYDROCODONE BITARTRATE AND ACETAMINOPHEN 7.5; 325 MG/1; MG/1
1 TABLET ORAL ONCE AS NEEDED
Status: DISCONTINUED | OUTPATIENT
Start: 2018-03-29 | End: 2018-03-29 | Stop reason: HOSPADM

## 2018-03-29 RX ORDER — METOCLOPRAMIDE HYDROCHLORIDE 5 MG/ML
10 INJECTION INTRAMUSCULAR; INTRAVENOUS EVERY 6 HOURS
Status: DISCONTINUED | OUTPATIENT
Start: 2018-03-29 | End: 2018-03-31

## 2018-03-29 RX ORDER — NALOXONE HCL 0.4 MG/ML
0.1 VIAL (ML) INJECTION
Status: DISCONTINUED | OUTPATIENT
Start: 2018-03-29 | End: 2018-04-01 | Stop reason: HOSPADM

## 2018-03-29 RX ORDER — ACETAMINOPHEN 500 MG
1000 TABLET ORAL EVERY 6 HOURS PRN
COMMUNITY
End: 2019-08-05 | Stop reason: HOSPADM

## 2018-03-29 RX ORDER — PROMETHAZINE HYDROCHLORIDE 25 MG/1
25 TABLET ORAL ONCE AS NEEDED
Status: DISCONTINUED | OUTPATIENT
Start: 2018-03-29 | End: 2018-03-29 | Stop reason: HOSPADM

## 2018-03-29 RX ORDER — ACETAMINOPHEN 500 MG
1000 TABLET ORAL EVERY 6 HOURS PRN
Status: DISCONTINUED | OUTPATIENT
Start: 2018-03-29 | End: 2018-04-01 | Stop reason: HOSPADM

## 2018-03-29 RX ORDER — PROMETHAZINE HYDROCHLORIDE 25 MG/ML
12.5 INJECTION, SOLUTION INTRAMUSCULAR; INTRAVENOUS ONCE AS NEEDED
Status: DISCONTINUED | OUTPATIENT
Start: 2018-03-29 | End: 2018-03-29 | Stop reason: HOSPADM

## 2018-03-29 RX ORDER — KETOROLAC TROMETHAMINE 30 MG/ML
INJECTION, SOLUTION INTRAMUSCULAR; INTRAVENOUS AS NEEDED
Status: DISCONTINUED | OUTPATIENT
Start: 2018-03-29 | End: 2018-03-29 | Stop reason: SURG

## 2018-03-29 RX ORDER — DEXAMETHASONE SODIUM PHOSPHATE 10 MG/ML
INJECTION INTRAMUSCULAR; INTRAVENOUS AS NEEDED
Status: DISCONTINUED | OUTPATIENT
Start: 2018-03-29 | End: 2018-03-29 | Stop reason: SURG

## 2018-03-29 RX ORDER — ONDANSETRON 2 MG/ML
INJECTION INTRAMUSCULAR; INTRAVENOUS AS NEEDED
Status: DISCONTINUED | OUTPATIENT
Start: 2018-03-29 | End: 2018-03-29 | Stop reason: SURG

## 2018-03-29 RX ORDER — SODIUM CHLORIDE 9 MG/ML
INJECTION, SOLUTION INTRAVENOUS CONTINUOUS PRN
Status: DISCONTINUED | OUTPATIENT
Start: 2018-03-29 | End: 2018-03-29 | Stop reason: HOSPADM

## 2018-03-29 RX ORDER — MAGNESIUM HYDROXIDE 1200 MG/15ML
LIQUID ORAL AS NEEDED
Status: DISCONTINUED | OUTPATIENT
Start: 2018-03-29 | End: 2018-03-29 | Stop reason: HOSPADM

## 2018-03-29 RX ORDER — FENTANYL CITRATE 50 UG/ML
50 INJECTION, SOLUTION INTRAMUSCULAR; INTRAVENOUS
Status: DISCONTINUED | OUTPATIENT
Start: 2018-03-29 | End: 2018-03-29 | Stop reason: HOSPADM

## 2018-03-29 RX ORDER — PROPOFOL 10 MG/ML
VIAL (ML) INTRAVENOUS AS NEEDED
Status: DISCONTINUED | OUTPATIENT
Start: 2018-03-29 | End: 2018-03-29 | Stop reason: SURG

## 2018-03-29 RX ORDER — PROMETHAZINE HYDROCHLORIDE 25 MG/1
25 SUPPOSITORY RECTAL ONCE AS NEEDED
Status: DISCONTINUED | OUTPATIENT
Start: 2018-03-29 | End: 2018-03-29 | Stop reason: HOSPADM

## 2018-03-29 RX ORDER — ROCURONIUM BROMIDE 10 MG/ML
INJECTION, SOLUTION INTRAVENOUS AS NEEDED
Status: DISCONTINUED | OUTPATIENT
Start: 2018-03-29 | End: 2018-03-29 | Stop reason: SURG

## 2018-03-29 RX ORDER — HYDROMORPHONE HCL IN 0.9% NACL 10 MG/50ML
PATIENT CONTROLLED ANALGESIA SYRINGE INTRAVENOUS CONTINUOUS
Status: DISCONTINUED | OUTPATIENT
Start: 2018-03-29 | End: 2018-03-31

## 2018-03-29 RX ORDER — IPRATROPIUM BROMIDE AND ALBUTEROL SULFATE 2.5; .5 MG/3ML; MG/3ML
3 SOLUTION RESPIRATORY (INHALATION) ONCE
Status: COMPLETED | OUTPATIENT
Start: 2018-03-29 | End: 2018-03-29

## 2018-03-29 RX ORDER — LABETALOL HYDROCHLORIDE 5 MG/ML
5 INJECTION, SOLUTION INTRAVENOUS
Status: DISCONTINUED | OUTPATIENT
Start: 2018-03-29 | End: 2018-03-29 | Stop reason: HOSPADM

## 2018-03-29 RX ORDER — BUPIVACAINE HYDROCHLORIDE AND EPINEPHRINE 2.5; 5 MG/ML; UG/ML
INJECTION, SOLUTION INFILTRATION; PERINEURAL AS NEEDED
Status: DISCONTINUED | OUTPATIENT
Start: 2018-03-29 | End: 2018-03-29 | Stop reason: HOSPADM

## 2018-03-29 RX ORDER — DIPHENHYDRAMINE HYDROCHLORIDE 50 MG/ML
25 INJECTION INTRAMUSCULAR; INTRAVENOUS EVERY 6 HOURS PRN
Status: DISCONTINUED | OUTPATIENT
Start: 2018-03-29 | End: 2018-04-01 | Stop reason: HOSPADM

## 2018-03-29 RX ORDER — LIDOCAINE HYDROCHLORIDE 10 MG/ML
0.5 INJECTION, SOLUTION EPIDURAL; INFILTRATION; INTRACAUDAL; PERINEURAL ONCE AS NEEDED
Status: DISCONTINUED | OUTPATIENT
Start: 2018-03-29 | End: 2018-03-29 | Stop reason: HOSPADM

## 2018-03-29 RX ORDER — LISINOPRIL 20 MG/1
20 TABLET ORAL DAILY
Status: DISCONTINUED | OUTPATIENT
Start: 2018-03-29 | End: 2018-04-01 | Stop reason: HOSPADM

## 2018-03-29 RX ORDER — MIDAZOLAM HYDROCHLORIDE 1 MG/ML
2 INJECTION INTRAMUSCULAR; INTRAVENOUS
Status: DISCONTINUED | OUTPATIENT
Start: 2018-03-29 | End: 2018-03-29 | Stop reason: HOSPADM

## 2018-03-29 RX ORDER — FLUMAZENIL 0.1 MG/ML
0.2 INJECTION INTRAVENOUS AS NEEDED
Status: DISCONTINUED | OUTPATIENT
Start: 2018-03-29 | End: 2018-03-29 | Stop reason: HOSPADM

## 2018-03-29 RX ORDER — OXYCODONE AND ACETAMINOPHEN 7.5; 325 MG/1; MG/1
1 TABLET ORAL ONCE AS NEEDED
Status: DISCONTINUED | OUTPATIENT
Start: 2018-03-29 | End: 2018-03-29 | Stop reason: HOSPADM

## 2018-03-29 RX ORDER — ONDANSETRON 2 MG/ML
4 INJECTION INTRAMUSCULAR; INTRAVENOUS ONCE AS NEEDED
Status: DISCONTINUED | OUTPATIENT
Start: 2018-03-29 | End: 2018-03-29 | Stop reason: HOSPADM

## 2018-03-29 RX ADMIN — ROCURONIUM BROMIDE 50 MG: 10 INJECTION INTRAVENOUS at 14:09

## 2018-03-29 RX ADMIN — HYDROMORPHONE HYDROCHLORIDE 0.25 MG: 2 INJECTION INTRAMUSCULAR; INTRAVENOUS; SUBCUTANEOUS at 16:35

## 2018-03-29 RX ADMIN — METOCLOPRAMIDE 10 MG: 5 INJECTION, SOLUTION INTRAMUSCULAR; INTRAVENOUS at 21:39

## 2018-03-29 RX ADMIN — HYDROMORPHONE HYDROCHLORIDE 0.25 MG: 2 INJECTION INTRAMUSCULAR; INTRAVENOUS; SUBCUTANEOUS at 16:55

## 2018-03-29 RX ADMIN — FAMOTIDINE 20 MG: 10 INJECTION INTRAVENOUS at 21:39

## 2018-03-29 RX ADMIN — ONDANSETRON 4 MG: 2 INJECTION INTRAMUSCULAR; INTRAVENOUS at 15:28

## 2018-03-29 RX ADMIN — FENTANYL CITRATE 50 MCG: 50 INJECTION INTRAMUSCULAR; INTRAVENOUS at 16:19

## 2018-03-29 RX ADMIN — PROPOFOL 160 MG: 10 INJECTION, EMULSION INTRAVENOUS at 14:09

## 2018-03-29 RX ADMIN — SODIUM CHLORIDE, POTASSIUM CHLORIDE, SODIUM LACTATE AND CALCIUM CHLORIDE 150 ML/HR: 600; 310; 30; 20 INJECTION, SOLUTION INTRAVENOUS at 21:39

## 2018-03-29 RX ADMIN — IPRATROPIUM BROMIDE AND ALBUTEROL SULFATE 3 ML: .5; 3 SOLUTION RESPIRATORY (INHALATION) at 13:46

## 2018-03-29 RX ADMIN — FENTANYL CITRATE 100 MCG: 50 INJECTION INTRAMUSCULAR; INTRAVENOUS at 14:05

## 2018-03-29 RX ADMIN — FENTANYL CITRATE 50 MCG: 50 INJECTION INTRAMUSCULAR; INTRAVENOUS at 15:35

## 2018-03-29 RX ADMIN — MIDAZOLAM 1 MG: 1 INJECTION INTRAMUSCULAR; INTRAVENOUS at 13:52

## 2018-03-29 RX ADMIN — Medication: at 17:48

## 2018-03-29 RX ADMIN — SUGAMMADEX 200 MG: 100 INJECTION, SOLUTION INTRAVENOUS at 15:32

## 2018-03-29 RX ADMIN — HYDROMORPHONE HYDROCHLORIDE 0.25 MG: 2 INJECTION INTRAMUSCULAR; INTRAVENOUS; SUBCUTANEOUS at 16:40

## 2018-03-29 RX ADMIN — SODIUM CHLORIDE, POTASSIUM CHLORIDE, SODIUM LACTATE AND CALCIUM CHLORIDE 9 ML/HR: 600; 310; 30; 20 INJECTION, SOLUTION INTRAVENOUS at 13:52

## 2018-03-29 RX ADMIN — LIDOCAINE HYDROCHLORIDE 60 MG: 20 INJECTION, SOLUTION INFILTRATION; PERINEURAL at 14:09

## 2018-03-29 RX ADMIN — FENTANYL CITRATE 50 MCG: 50 INJECTION INTRAMUSCULAR; INTRAVENOUS at 16:29

## 2018-03-29 RX ADMIN — DEXAMETHASONE SODIUM PHOSPHATE 6 MG: 10 INJECTION INTRAMUSCULAR; INTRAVENOUS at 15:10

## 2018-03-29 RX ADMIN — FAMOTIDINE 20 MG: 10 INJECTION INTRAVENOUS at 13:52

## 2018-03-29 RX ADMIN — CEFOXITIN SODIUM 2 G: 2 POWDER, FOR SOLUTION INTRAVENOUS at 21:39

## 2018-03-29 RX ADMIN — CEFOXITIN SODIUM 2 G: 1 POWDER, FOR SOLUTION INTRAVENOUS at 14:06

## 2018-03-29 RX ADMIN — SODIUM CHLORIDE, POTASSIUM CHLORIDE, SODIUM LACTATE AND CALCIUM CHLORIDE: 600; 310; 30; 20 INJECTION, SOLUTION INTRAVENOUS at 15:15

## 2018-03-29 RX ADMIN — KETOROLAC TROMETHAMINE 30 MG: 30 INJECTION, SOLUTION INTRAMUSCULAR; INTRAVENOUS at 15:27

## 2018-03-29 RX ADMIN — FENTANYL CITRATE 50 MCG: 50 INJECTION INTRAMUSCULAR; INTRAVENOUS at 14:35

## 2018-03-29 RX ADMIN — PROPOFOL 100 MG: 10 INJECTION, EMULSION INTRAVENOUS at 15:44

## 2018-03-29 NOTE — ANESTHESIA PREPROCEDURE EVALUATION
Anesthesia Evaluation     Patient summary reviewed and Nursing notes reviewed   NPO Solid Status: > 8 hours  NPO Liquid Status: > 8 hours           Airway   Mallampati: I  TM distance: >3 FB  Neck ROM: full  No difficulty expected  Dental    (+) edentulous    Pulmonary    (+) a smoker Current, wheezes,   Cardiovascular - normal exam    (+) hypertension,       Neuro/Psych  (+) psychiatric history Anxiety,     GI/Hepatic/Renal/Endo    (+) obesity,  GERD,      Musculoskeletal     Abdominal    Substance History - negative use     OB/GYN negative ob/gyn ROS         Other   (+) arthritis                     Anesthesia Plan    ASA 3     MAC     intravenous induction   Anesthetic plan and risks discussed with patient.

## 2018-03-29 NOTE — ANESTHESIA POSTPROCEDURE EVALUATION
"Patient: Darryn Baxter    Procedure Summary     Date:  03/29/18 Room / Location:  [facility] OR 02 / [facility] MAIN OR    Anesthesia Start:  1359 Anesthesia Stop:  1554    Procedure:  HAND ASSISTED LAPAROSCOPIC LOW ANTERIOR COLON RESECTION (N/A Abdomen) Diagnosis:       Diverticulitis of large intestine with perforation and abscess without bleeding      (Diverticulitis of large intestine with perforation and abscess without bleeding [K57.20])    Surgeon:  Oneil Hallman MD Provider:  Kathie Goodman MD    Anesthesia Type:  MAC ASA Status:  3          Anesthesia Type: MAC  Last vitals  BP   124/82 (03/29/18 1720)   Temp   37 °C (98.6 °F) (03/29/18 1720)   Pulse   89 (03/29/18 1720)   Resp   16 (03/29/18 1720)     SpO2   95 % (03/29/18 1720)     Post Anesthesia Care and Evaluation    Patient location during evaluation: PACU  Patient participation: complete - patient participated  Level of consciousness: awake and alert  Pain management: adequate  Airway patency: patent  Anesthetic complications: No anesthetic complications    Cardiovascular status: acceptable  Respiratory status: acceptable  Hydration status: acceptable    Comments: /82 (BP Location: Right arm, Patient Position: Lying)   Pulse 89   Temp 37 °C (98.6 °F) (Oral)   Resp 16   Ht 175.3 cm (69\")   Wt 103 kg (227 lb 11.2 oz)   SpO2 95%   BMI 33.63 kg/m²       "

## 2018-03-29 NOTE — ANESTHESIA PROCEDURE NOTES
Airway  Urgency: elective    Date/Time: 3/29/2018 2:12 PM  Airway not difficult    General Information and Staff    Patient location during procedure: OR  Anesthesiologist: RUDDY PITTMAN  CRNA: DOMINICK TIPTON    Indications and Patient Condition  Indications for airway management: airway protection    Preoxygenated: yes  Mask difficulty assessment: 1 - vent by mask    Final Airway Details  Final airway type: endotracheal airway      Successful airway: ETT  Cuffed: yes   Successful intubation technique: direct laryngoscopy  Endotracheal tube insertion site: oral  Blade: Lakshmi  Blade size: #4  ETT size: 7.5 mm  Cormack-Lehane Classification: grade I - full view of glottis  Placement verified by: chest auscultation and capnometry   Cuff volume (mL): 5  Measured from: lips  ETT to lips (cm): 21  Number of attempts at approach: 1    Additional Comments  Smooth IV induction. Trachea intubated. Cuff up. Dentition intact without injury.

## 2018-03-30 LAB
ANION GAP SERPL CALCULATED.3IONS-SCNC: 12.8 MMOL/L
BASOPHILS # BLD AUTO: 0.01 10*3/MM3 (ref 0–0.2)
BASOPHILS NFR BLD AUTO: 0.1 % (ref 0–1.5)
BUN BLD-MCNC: 18 MG/DL (ref 8–23)
BUN/CREAT SERPL: 21.2 (ref 7–25)
CALCIUM SPEC-SCNC: 8.8 MG/DL (ref 8.6–10.5)
CHLORIDE SERPL-SCNC: 102 MMOL/L (ref 98–107)
CO2 SERPL-SCNC: 23.2 MMOL/L (ref 22–29)
CREAT BLD-MCNC: 0.85 MG/DL (ref 0.76–1.27)
DEPRECATED RDW RBC AUTO: 50.3 FL (ref 37–54)
EOSINOPHIL # BLD AUTO: 0 10*3/MM3 (ref 0–0.7)
EOSINOPHIL NFR BLD AUTO: 0 % (ref 0.3–6.2)
ERYTHROCYTE [DISTWIDTH] IN BLOOD BY AUTOMATED COUNT: 13.9 % (ref 11.5–14.5)
GFR SERPL CREATININE-BSD FRML MDRD: 90 ML/MIN/1.73
GLUCOSE BLD-MCNC: 134 MG/DL (ref 65–99)
HCT VFR BLD AUTO: 36 % (ref 40.4–52.2)
HGB BLD-MCNC: 11.6 G/DL (ref 13.7–17.6)
IMM GRANULOCYTES # BLD: 0.03 10*3/MM3 (ref 0–0.03)
IMM GRANULOCYTES NFR BLD: 0.2 % (ref 0–0.5)
LYMPHOCYTES # BLD AUTO: 1.02 10*3/MM3 (ref 0.9–4.8)
LYMPHOCYTES NFR BLD AUTO: 7.6 % (ref 19.6–45.3)
MCH RBC QN AUTO: 32 PG (ref 27–32.7)
MCHC RBC AUTO-ENTMCNC: 32.2 G/DL (ref 32.6–36.4)
MCV RBC AUTO: 99.4 FL (ref 79.8–96.2)
MONOCYTES # BLD AUTO: 0.97 10*3/MM3 (ref 0.2–1.2)
MONOCYTES NFR BLD AUTO: 7.2 % (ref 5–12)
NEUTROPHILS # BLD AUTO: 11.42 10*3/MM3 (ref 1.9–8.1)
NEUTROPHILS NFR BLD AUTO: 84.9 % (ref 42.7–76)
PLATELET # BLD AUTO: 235 10*3/MM3 (ref 140–500)
PMV BLD AUTO: 9.6 FL (ref 6–12)
POTASSIUM BLD-SCNC: 4.6 MMOL/L (ref 3.5–5.2)
RBC # BLD AUTO: 3.62 10*6/MM3 (ref 4.6–6)
SODIUM BLD-SCNC: 138 MMOL/L (ref 136–145)
WBC NRBC COR # BLD: 13.45 10*3/MM3 (ref 4.5–10.7)

## 2018-03-30 PROCEDURE — 25010000002 METOCLOPRAMIDE PER 10 MG: Performed by: SURGERY

## 2018-03-30 PROCEDURE — 25010000002 CEFOXITIN PER 1 G: Performed by: SURGERY

## 2018-03-30 PROCEDURE — 85025 COMPLETE CBC W/AUTO DIFF WBC: CPT | Performed by: SURGERY

## 2018-03-30 PROCEDURE — 80048 BASIC METABOLIC PNL TOTAL CA: CPT | Performed by: SURGERY

## 2018-03-30 RX ADMIN — SODIUM CHLORIDE, POTASSIUM CHLORIDE, SODIUM LACTATE AND CALCIUM CHLORIDE 100 ML/HR: 600; 310; 30; 20 INJECTION, SOLUTION INTRAVENOUS at 21:02

## 2018-03-30 RX ADMIN — CEFOXITIN SODIUM 2 G: 2 POWDER, FOR SOLUTION INTRAVENOUS at 01:21

## 2018-03-30 RX ADMIN — CEFOXITIN SODIUM 2 G: 2 POWDER, FOR SOLUTION INTRAVENOUS at 09:40

## 2018-03-30 RX ADMIN — ACETAMINOPHEN 1000 MG: 500 TABLET ORAL at 17:32

## 2018-03-30 RX ADMIN — SODIUM CHLORIDE, POTASSIUM CHLORIDE, SODIUM LACTATE AND CALCIUM CHLORIDE 150 ML/HR: 600; 310; 30; 20 INJECTION, SOLUTION INTRAVENOUS at 05:32

## 2018-03-30 RX ADMIN — METOCLOPRAMIDE 10 MG: 5 INJECTION, SOLUTION INTRAMUSCULAR; INTRAVENOUS at 05:34

## 2018-03-30 RX ADMIN — METOCLOPRAMIDE 10 MG: 5 INJECTION, SOLUTION INTRAMUSCULAR; INTRAVENOUS at 17:32

## 2018-03-30 RX ADMIN — FAMOTIDINE 20 MG: 10 INJECTION INTRAVENOUS at 09:40

## 2018-03-30 RX ADMIN — LISINOPRIL 20 MG: 20 TABLET ORAL at 09:40

## 2018-03-30 RX ADMIN — ACETAMINOPHEN 1000 MG: 500 TABLET ORAL at 23:32

## 2018-03-30 RX ADMIN — Medication: at 11:17

## 2018-03-30 RX ADMIN — FAMOTIDINE 20 MG: 10 INJECTION INTRAVENOUS at 21:08

## 2018-03-31 LAB
ANION GAP SERPL CALCULATED.3IONS-SCNC: 11.3 MMOL/L
BASOPHILS # BLD AUTO: 0.03 10*3/MM3 (ref 0–0.2)
BASOPHILS NFR BLD AUTO: 0.4 % (ref 0–1.5)
BUN BLD-MCNC: 12 MG/DL (ref 8–23)
BUN/CREAT SERPL: 15.2 (ref 7–25)
CALCIUM SPEC-SCNC: 8 MG/DL (ref 8.6–10.5)
CHLORIDE SERPL-SCNC: 108 MMOL/L (ref 98–107)
CO2 SERPL-SCNC: 24.7 MMOL/L (ref 22–29)
CREAT BLD-MCNC: 0.79 MG/DL (ref 0.76–1.27)
DEPRECATED RDW RBC AUTO: 53.1 FL (ref 37–54)
EOSINOPHIL # BLD AUTO: 0.13 10*3/MM3 (ref 0–0.7)
EOSINOPHIL NFR BLD AUTO: 1.5 % (ref 0.3–6.2)
ERYTHROCYTE [DISTWIDTH] IN BLOOD BY AUTOMATED COUNT: 14.2 % (ref 11.5–14.5)
GFR SERPL CREATININE-BSD FRML MDRD: 98 ML/MIN/1.73
GLUCOSE BLD-MCNC: 115 MG/DL (ref 65–99)
HCT VFR BLD AUTO: 32.5 % (ref 40.4–52.2)
HGB BLD-MCNC: 10.2 G/DL (ref 13.7–17.6)
IMM GRANULOCYTES # BLD: 0.02 10*3/MM3 (ref 0–0.03)
IMM GRANULOCYTES NFR BLD: 0.2 % (ref 0–0.5)
LYMPHOCYTES # BLD AUTO: 2.72 10*3/MM3 (ref 0.9–4.8)
LYMPHOCYTES NFR BLD AUTO: 31.8 % (ref 19.6–45.3)
MCH RBC QN AUTO: 32 PG (ref 27–32.7)
MCHC RBC AUTO-ENTMCNC: 31.4 G/DL (ref 32.6–36.4)
MCV RBC AUTO: 101.9 FL (ref 79.8–96.2)
MONOCYTES # BLD AUTO: 1.01 10*3/MM3 (ref 0.2–1.2)
MONOCYTES NFR BLD AUTO: 11.8 % (ref 5–12)
NEUTROPHILS # BLD AUTO: 4.64 10*3/MM3 (ref 1.9–8.1)
NEUTROPHILS NFR BLD AUTO: 54.3 % (ref 42.7–76)
PLATELET # BLD AUTO: 190 10*3/MM3 (ref 140–500)
PMV BLD AUTO: 9.7 FL (ref 6–12)
POTASSIUM BLD-SCNC: 4.2 MMOL/L (ref 3.5–5.2)
RBC # BLD AUTO: 3.19 10*6/MM3 (ref 4.6–6)
SODIUM BLD-SCNC: 144 MMOL/L (ref 136–145)
WBC NRBC COR # BLD: 8.55 10*3/MM3 (ref 4.5–10.7)

## 2018-03-31 PROCEDURE — 85025 COMPLETE CBC W/AUTO DIFF WBC: CPT | Performed by: SURGERY

## 2018-03-31 PROCEDURE — 80048 BASIC METABOLIC PNL TOTAL CA: CPT | Performed by: SURGERY

## 2018-03-31 PROCEDURE — 25010000002 METOCLOPRAMIDE PER 10 MG: Performed by: SURGERY

## 2018-03-31 RX ORDER — HYDROCODONE BITARTRATE AND ACETAMINOPHEN 7.5; 325 MG/1; MG/1
1 TABLET ORAL EVERY 4 HOURS PRN
Status: DISCONTINUED | OUTPATIENT
Start: 2018-03-31 | End: 2018-04-01 | Stop reason: HOSPADM

## 2018-03-31 RX ORDER — PANTOPRAZOLE SODIUM 40 MG/1
40 TABLET, DELAYED RELEASE ORAL
Status: DISCONTINUED | OUTPATIENT
Start: 2018-03-31 | End: 2018-04-01 | Stop reason: HOSPADM

## 2018-03-31 RX ORDER — SODIUM CHLORIDE, SODIUM LACTATE, POTASSIUM CHLORIDE, CALCIUM CHLORIDE 600; 310; 30; 20 MG/100ML; MG/100ML; MG/100ML; MG/100ML
50 INJECTION, SOLUTION INTRAVENOUS CONTINUOUS
Status: DISCONTINUED | OUTPATIENT
Start: 2018-03-31 | End: 2018-04-01 | Stop reason: HOSPADM

## 2018-03-31 RX ORDER — HYDROMORPHONE HYDROCHLORIDE 1 MG/ML
0.5 INJECTION, SOLUTION INTRAMUSCULAR; INTRAVENOUS; SUBCUTANEOUS EVERY 4 HOURS PRN
Status: DISCONTINUED | OUTPATIENT
Start: 2018-03-31 | End: 2018-04-01 | Stop reason: HOSPADM

## 2018-03-31 RX ORDER — HYDROCODONE BITARTRATE AND ACETAMINOPHEN 7.5; 325 MG/1; MG/1
2 TABLET ORAL EVERY 4 HOURS PRN
Status: DISCONTINUED | OUTPATIENT
Start: 2018-03-31 | End: 2018-04-01 | Stop reason: HOSPADM

## 2018-03-31 RX ORDER — METOCLOPRAMIDE HYDROCHLORIDE 5 MG/ML
10 INJECTION INTRAMUSCULAR; INTRAVENOUS ONCE
Status: COMPLETED | OUTPATIENT
Start: 2018-03-31 | End: 2018-03-31

## 2018-03-31 RX ADMIN — METOCLOPRAMIDE 10 MG: 5 INJECTION, SOLUTION INTRAMUSCULAR; INTRAVENOUS at 05:59

## 2018-03-31 RX ADMIN — METOCLOPRAMIDE 10 MG: 5 INJECTION, SOLUTION INTRAMUSCULAR; INTRAVENOUS at 00:34

## 2018-03-31 RX ADMIN — HYDROCODONE BITARTRATE AND ACETAMINOPHEN 2 TABLET: 7.5; 325 TABLET ORAL at 19:02

## 2018-03-31 RX ADMIN — ACETAMINOPHEN 1000 MG: 500 TABLET ORAL at 05:59

## 2018-03-31 RX ADMIN — SODIUM CHLORIDE, POTASSIUM CHLORIDE, SODIUM LACTATE AND CALCIUM CHLORIDE 100 ML/HR: 600; 310; 30; 20 INJECTION, SOLUTION INTRAVENOUS at 07:13

## 2018-03-31 RX ADMIN — METOCLOPRAMIDE 10 MG: 5 INJECTION, SOLUTION INTRAMUSCULAR; INTRAVENOUS at 11:15

## 2018-03-31 RX ADMIN — SODIUM CHLORIDE, POTASSIUM CHLORIDE, SODIUM LACTATE AND CALCIUM CHLORIDE 50 ML/HR: 600; 310; 30; 20 INJECTION, SOLUTION INTRAVENOUS at 09:35

## 2018-03-31 RX ADMIN — PANTOPRAZOLE SODIUM 40 MG: 40 TABLET, DELAYED RELEASE ORAL at 09:16

## 2018-03-31 RX ADMIN — HYDROCODONE BITARTRATE AND ACETAMINOPHEN 2 TABLET: 7.5; 325 TABLET ORAL at 12:05

## 2018-04-01 VITALS
RESPIRATION RATE: 18 BRPM | HEART RATE: 60 BPM | TEMPERATURE: 97.1 F | DIASTOLIC BLOOD PRESSURE: 71 MMHG | OXYGEN SATURATION: 97 % | WEIGHT: 227.7 LBS | BODY MASS INDEX: 33.72 KG/M2 | SYSTOLIC BLOOD PRESSURE: 116 MMHG | HEIGHT: 69 IN

## 2018-04-01 PROBLEM — K57.20 DIVERTICULITIS OF LARGE INTESTINE WITH PERFORATION AND ABSCESS WITHOUT BLEEDING: Status: RESOLVED | Noted: 2018-03-07 | Resolved: 2018-04-01

## 2018-04-01 RX ORDER — HYDROCODONE BITARTRATE AND ACETAMINOPHEN 7.5; 325 MG/1; MG/1
1-2 TABLET ORAL EVERY 4 HOURS PRN
Qty: 30 TABLET | Refills: 0 | Status: SHIPPED | OUTPATIENT
Start: 2018-04-01 | End: 2018-05-01

## 2018-04-01 RX ADMIN — SODIUM CHLORIDE, POTASSIUM CHLORIDE, SODIUM LACTATE AND CALCIUM CHLORIDE 50 ML/HR: 600; 310; 30; 20 INJECTION, SOLUTION INTRAVENOUS at 05:54

## 2018-04-01 RX ADMIN — PANTOPRAZOLE SODIUM 40 MG: 40 TABLET, DELAYED RELEASE ORAL at 07:25

## 2018-04-01 RX ADMIN — HYDROCODONE BITARTRATE AND ACETAMINOPHEN 2 TABLET: 7.5; 325 TABLET ORAL at 07:25

## 2018-04-01 RX ADMIN — LISINOPRIL 20 MG: 20 TABLET ORAL at 08:00

## 2018-04-01 NOTE — PLAN OF CARE
Problem: Patient Care Overview  Goal: Plan of Care Review  Outcome: Ongoing (interventions implemented as appropriate)   04/01/18 0583   Coping/Psychosocial   Plan of Care Reviewed With patient   Plan of Care Review   Progress improving   OTHER   Outcome Summary Pt had a good night. C/o only minimal discomfort, refused any pain medications. Ambulated in his room a lot. Independent. Tolerated full liquids good. VSS. Had few small BMs. Continue to monitor.       Problem: Pain, Acute (Adult)  Goal: Acceptable Pain Control/Comfort Level  Outcome: Ongoing (interventions implemented as appropriate)      Problem: Fall Risk (Adult)  Goal: Identify Related Risk Factors and Signs and Symptoms  Outcome: Ongoing (interventions implemented as appropriate)      Problem: Skin Injury Risk (Adult)  Goal: Identify Related Risk Factors and Signs and Symptoms  Outcome: Ongoing (interventions implemented as appropriate)    Goal: Skin Health and Integrity  Outcome: Ongoing (interventions implemented as appropriate)

## 2018-04-01 NOTE — DISCHARGE SUMMARY
April 1, 2018    Darryn Baxter  [id number]      ADMITTING DIAGNOSIS:  Diverticulitis of large intestine with perforation and abscess without bleeding [K57.20]  Diverticulitis of large intestine with perforation and abscess without bleeding [K57.20]    DISCHARGE DIAGNOSIS:  Same.    ADMITTING MD:  Oneil Hallman M.D.    CONSULTANTS:  None    PRIMARY MD:  Darryn Pelaez MD    PROCEDURES PERFORMED:   Procedure(s):  HAND ASSISTED LAPAROSCOPIC LOW ANTERIOR COLON RESECTION       HOSPITAL COURSE:  Patient is a 63 y.o. male presented with a known history of recent complicated acute sigmoid diverticulitis with microperforation and microabscess.  He was treated conservatively with plans to bring him back electively for definitive resection.  He presented this admission for that resection.  He underwent a hand-assisted laparoscopic low anterior resection of what turned out to be a rather sizable phlegmon at the rectosigmoid junction.  He was admitted for postoperative care.  Despite the amount of subacute and chronic disease found in his abdomen, his postoperative course was very uneventful and unremarkable.  Bowel function began returning within about 24 hours and he was started on noncarbonated clears and advanced slowly to ultimately a full liquid diet by the morning of discharge.  By then he had several bowel movements and multiple episodes of flatus.  Pain was easily managed, laboratory values normalized after 24-48 hours and vital signs remained stable.  Incisions remained clean and dry.  Ultimately, by the morning of discharge, he was really not requiring of any further inpatient care or so he was cleared for discharge.  He was sent home with a prescription for hydrocodone for pain and instructions to stay on full liquids through today, soft diet tomorrow then advance as tolerated and he was given activity restrictions and a follow-up appointment in about a week was to be scheduled.  Follow she was still pending at the  time of this dictation.    DISPOSITION:  Home.    Discharge Medications   Darryn Baxter   Home Medication Instructions IVONE:[id number]    Printed on:04/01/18 1022   Medication Information                      acetaminophen (TYLENOL) 500 MG tablet  Take 1,000 mg by mouth Every 6 (Six) Hours As Needed for Mild Pain .             acetaminophen (TYLENOL) 650 MG 8 hr tablet  Take 650 mg by mouth Every 6 (Six) Hours As Needed for Mild Pain .             aspirin 325 MG tablet  Take 650 mg by mouth Every 6 (Six) Hours As Needed. TO STOP PTS             HYDROcodone-acetaminophen (NORCO) 7.5-325 MG per tablet  Take 1-2 tablets by mouth Every 4 (Four) Hours As Needed for Moderate Pain  for up to 30 days.             ibuprofen (ADVIL,MOTRIN) 200 MG tablet  Take 800 mg by mouth Every 6 (Six) Hours As Needed for Mild Pain . HELD FOR SURGERY             lisinopril (PRINIVIL,ZESTRIL) 20 MG tablet  Take 1 tablet by mouth Daily.             saccharomyces boulardii (FLORASTOR) 250 MG capsule  Take 250 mg by mouth 2 (Two) Times a Day.             vitamin C (ASCORBIC ACID) 500 MG tablet  Take 1,000 mg by mouth Daily.                   Oneil Hallman M.D.  04/01/18  10:22 AM    Time: 20 minutes.

## 2018-04-02 LAB
CYTO UR: NORMAL
LAB AP CASE REPORT: NORMAL
Lab: NORMAL
PATH REPORT.FINAL DX SPEC: NORMAL
PATH REPORT.GROSS SPEC: NORMAL

## 2018-05-18 DIAGNOSIS — I10 ESSENTIAL HYPERTENSION: ICD-10-CM

## 2018-05-18 RX ORDER — LISINOPRIL 20 MG/1
20 TABLET ORAL DAILY
Qty: 30 TABLET | Refills: 0 | Status: SHIPPED | OUTPATIENT
Start: 2018-05-18 | End: 2018-05-29 | Stop reason: SDUPTHER

## 2018-05-29 ENCOUNTER — OFFICE VISIT (OUTPATIENT)
Dept: FAMILY MEDICINE CLINIC | Facility: CLINIC | Age: 63
End: 2018-05-29

## 2018-05-29 VITALS
SYSTOLIC BLOOD PRESSURE: 120 MMHG | OXYGEN SATURATION: 95 % | TEMPERATURE: 97.8 F | HEIGHT: 69 IN | RESPIRATION RATE: 16 BRPM | BODY MASS INDEX: 35.25 KG/M2 | WEIGHT: 238 LBS | DIASTOLIC BLOOD PRESSURE: 72 MMHG | HEART RATE: 78 BPM

## 2018-05-29 DIAGNOSIS — R39.14 FEELING OF INCOMPLETE BLADDER EMPTYING: ICD-10-CM

## 2018-05-29 DIAGNOSIS — I10 ESSENTIAL HYPERTENSION: Primary | ICD-10-CM

## 2018-05-29 PROCEDURE — 99214 OFFICE O/P EST MOD 30 MIN: CPT | Performed by: FAMILY MEDICINE

## 2018-05-29 RX ORDER — CALCIUM POLYCARBOPHIL 625 MG 625 MG/1
625 TABLET ORAL DAILY
COMMUNITY

## 2018-05-29 RX ORDER — LISINOPRIL 20 MG/1
20 TABLET ORAL DAILY
Qty: 90 TABLET | Refills: 1 | Status: SHIPPED | OUTPATIENT
Start: 2018-05-29 | End: 2019-01-04 | Stop reason: SDUPTHER

## 2018-05-29 NOTE — PROGRESS NOTES
"Subjective   Darryn Baxter is a 63 y.o. male.     History of Present Illness   Chief Complaint:   Chief Complaint   Patient presents with   • Hypertension       Darryn Baxter 63 y.o. male who presents today for Medical Management of the below listed issues and medication refills.he has a problem list of  Reviewed labs,  Patient Active Problem List   Diagnosis   • Essential hypertension   • Anxiety   • Impaired vision   • Impaired fasting glucose   • Blindness of both eyes   • Diverticulitis of intestine with perforation and abscess   .  Since the last visit, he has overall felt well.  he has been compliant with   Current Outpatient Prescriptions:   •  acetaminophen (TYLENOL) 500 MG tablet, Take 1,000 mg by mouth Every 6 (Six) Hours As Needed for Mild Pain ., Disp: , Rfl:   •  acetaminophen (TYLENOL) 650 MG 8 hr tablet, Take 650 mg by mouth Every 6 (Six) Hours As Needed for Mild Pain ., Disp: , Rfl:   •  aspirin 325 MG tablet, Take 650 mg by mouth Every 6 (Six) Hours As Needed. TO STOP PTS, Disp: , Rfl:   •  calcium polycarbophil (FIBERCON) 625 MG tablet, Take 625 mg by mouth Daily., Disp: , Rfl:   •  ibuprofen (ADVIL,MOTRIN) 200 MG tablet, Take 800 mg by mouth Every 6 (Six) Hours As Needed for Mild Pain . HELD FOR SURGERY, Disp: , Rfl:   •  lisinopril (PRINIVIL,ZESTRIL) 20 MG tablet, Take 1 tablet by mouth Daily., Disp: 30 tablet, Rfl: 0  •  vitamin C (ASCORBIC ACID) 500 MG tablet, Take 1,000 mg by mouth Daily., Disp: , Rfl: .  he denies medication side effects.    All of the chronic condition(s) listed above are stable w/o issues.    /72   Pulse 78   Temp 97.8 °F (36.6 °C) (Oral)   Resp 16   Ht 175.3 cm (69\")   Wt 108 kg (238 lb)   SpO2 95%   BMI 35.15 kg/m²     The following portions of the patient's history were reviewed and updated as appropriate: allergies, current medications, past family history, past medical history, past social history, past surgical history and problem list.    Review of " Systems   Constitutional: Negative for activity change, appetite change and unexpected weight change.   Eyes: Negative for visual disturbance.   Respiratory: Negative for chest tightness and shortness of breath.    Cardiovascular: Negative for chest pain and palpitations.   Endocrine: Negative for cold intolerance and heat intolerance.   Skin: Negative for color change.   Neurological: Negative for tremors, syncope and speech difficulty.   Psychiatric/Behavioral: Negative for behavioral problems, confusion and decreased concentration.       Objective   Physical Exam   Constitutional: He is oriented to person, place, and time. He appears well-developed and well-nourished.   HENT:   Head: Normocephalic.   Right Ear: External ear normal.   Left Ear: External ear normal.   Mouth/Throat: Oropharynx is clear and moist.   Eyes: Pupils are equal, round, and reactive to light.   Neck: Normal range of motion.   Cardiovascular: Normal rate and regular rhythm.    Pulmonary/Chest: Effort normal and breath sounds normal.   Abdominal: Soft. Bowel sounds are normal.   Musculoskeletal: Normal range of motion.   Neurological: He is alert and oriented to person, place, and time.   Skin: Skin is warm and dry.   Psychiatric: He has a normal mood and affect. His behavior is normal.   Nursing note and vitals reviewed.      Assessment/Plan   Darryn was seen today for hypertension.    Diagnoses and all orders for this visit:    Essential hypertension  -     lisinopril (PRINIVIL,ZESTRIL) 20 MG tablet; Take 1 tablet by mouth Daily.  -     Comprehensive metabolic panel; Future  -     Lipid panel; Future  -     CBC and Differential; Future  -     TSH; Future  -     PSA; Future    Feeling of incomplete bladder emptying   -     PSA; Future

## 2018-10-29 ENCOUNTER — RESULTS ENCOUNTER (OUTPATIENT)
Dept: FAMILY MEDICINE CLINIC | Facility: CLINIC | Age: 63
End: 2018-10-29

## 2018-10-29 DIAGNOSIS — R39.14 FEELING OF INCOMPLETE BLADDER EMPTYING: ICD-10-CM

## 2018-10-29 DIAGNOSIS — I10 ESSENTIAL HYPERTENSION: ICD-10-CM

## 2019-01-04 DIAGNOSIS — I10 ESSENTIAL HYPERTENSION: ICD-10-CM

## 2019-01-04 RX ORDER — LISINOPRIL 20 MG/1
20 TABLET ORAL DAILY
Qty: 30 TABLET | Refills: 0 | Status: SHIPPED | OUTPATIENT
Start: 2019-01-04 | End: 2019-01-28 | Stop reason: SDUPTHER

## 2019-01-25 LAB
ALBUMIN SERPL-MCNC: 4.5 G/DL (ref 3.5–5.2)
ALBUMIN/GLOB SERPL: 1.7 G/DL
ALP SERPL-CCNC: 59 U/L (ref 39–117)
ALT SERPL-CCNC: 17 U/L (ref 1–41)
AST SERPL-CCNC: 18 U/L (ref 1–40)
BASOPHILS # BLD AUTO: 0.05 10*3/MM3 (ref 0–0.2)
BASOPHILS NFR BLD AUTO: 0.6 % (ref 0–1.5)
BILIRUB SERPL-MCNC: 0.3 MG/DL (ref 0.1–1.2)
BUN SERPL-MCNC: 22 MG/DL (ref 8–23)
BUN/CREAT SERPL: 18 (ref 7–25)
CALCIUM SERPL-MCNC: 9.5 MG/DL (ref 8.6–10.5)
CHLORIDE SERPL-SCNC: 105 MMOL/L (ref 98–107)
CHOLEST SERPL-MCNC: 166 MG/DL (ref 0–200)
CO2 SERPL-SCNC: 21.7 MMOL/L (ref 22–29)
CREAT SERPL-MCNC: 1.22 MG/DL (ref 0.76–1.27)
EOSINOPHIL # BLD AUTO: 0.88 10*3/MM3 (ref 0–0.7)
EOSINOPHIL NFR BLD AUTO: 10.1 % (ref 0.3–6.2)
ERYTHROCYTE [DISTWIDTH] IN BLOOD BY AUTOMATED COUNT: 13.2 % (ref 11.5–14.5)
GLOBULIN SER CALC-MCNC: 2.7 GM/DL
GLUCOSE SERPL-MCNC: 105 MG/DL (ref 65–99)
HCT VFR BLD AUTO: 46.5 % (ref 40.4–52.2)
HDLC SERPL-MCNC: 32 MG/DL (ref 40–60)
HGB BLD-MCNC: 15.5 G/DL (ref 13.7–17.6)
IMM GRANULOCYTES # BLD AUTO: 0.03 10*3/MM3 (ref 0–0.03)
IMM GRANULOCYTES NFR BLD AUTO: 0.3 % (ref 0–0.5)
LDLC SERPL CALC-MCNC: 100 MG/DL (ref 0–100)
LYMPHOCYTES # BLD AUTO: 3.06 10*3/MM3 (ref 0.9–4.8)
LYMPHOCYTES NFR BLD AUTO: 35.1 % (ref 19.6–45.3)
MCH RBC QN AUTO: 33.9 PG (ref 27–32.7)
MCHC RBC AUTO-ENTMCNC: 33.3 G/DL (ref 32.6–36.4)
MCV RBC AUTO: 101.8 FL (ref 79.8–96.2)
MONOCYTES # BLD AUTO: 0.89 10*3/MM3 (ref 0.2–1.2)
MONOCYTES NFR BLD AUTO: 10.2 % (ref 5–12)
NEUTROPHILS # BLD AUTO: 3.81 10*3/MM3 (ref 1.9–8.1)
NEUTROPHILS NFR BLD AUTO: 43.7 % (ref 42.7–76)
PLATELET # BLD AUTO: 217 10*3/MM3 (ref 140–500)
POTASSIUM SERPL-SCNC: 4.9 MMOL/L (ref 3.5–5.2)
PROT SERPL-MCNC: 7.2 G/DL (ref 6–8.5)
PSA SERPL-MCNC: 0.22 NG/ML (ref 0–4)
RBC # BLD AUTO: 4.57 10*6/MM3 (ref 4.6–6)
SODIUM SERPL-SCNC: 142 MMOL/L (ref 136–145)
TRIGL SERPL-MCNC: 171 MG/DL (ref 0–150)
TSH SERPL DL<=0.005 MIU/L-ACNC: 2.63 MIU/ML (ref 0.27–4.2)
VLDLC SERPL CALC-MCNC: 34.2 MG/DL (ref 5–40)
WBC # BLD AUTO: 8.72 10*3/MM3 (ref 4.5–10.7)

## 2019-01-28 ENCOUNTER — OFFICE VISIT (OUTPATIENT)
Dept: FAMILY MEDICINE CLINIC | Facility: CLINIC | Age: 64
End: 2019-01-28

## 2019-01-28 VITALS
HEART RATE: 72 BPM | RESPIRATION RATE: 16 BRPM | WEIGHT: 265 LBS | DIASTOLIC BLOOD PRESSURE: 81 MMHG | BODY MASS INDEX: 39.25 KG/M2 | HEIGHT: 69 IN | OXYGEN SATURATION: 97 % | SYSTOLIC BLOOD PRESSURE: 139 MMHG | TEMPERATURE: 97.9 F

## 2019-01-28 DIAGNOSIS — I10 ESSENTIAL HYPERTENSION: ICD-10-CM

## 2019-01-28 DIAGNOSIS — E66.01 MORBIDLY OBESE (HCC): ICD-10-CM

## 2019-01-28 DIAGNOSIS — H54.7 IMPAIRED VISION: ICD-10-CM

## 2019-01-28 DIAGNOSIS — K57.80 DIVERTICULITIS OF INTESTINE WITH PERFORATION AND ABSCESS, UNSPECIFIED BLEEDING STATUS, UNSPECIFIED PART OF INTESTINAL TRACT: ICD-10-CM

## 2019-01-28 DIAGNOSIS — H54.3 BLINDNESS OF BOTH EYES: ICD-10-CM

## 2019-01-28 DIAGNOSIS — F41.9 ANXIETY: Primary | ICD-10-CM

## 2019-01-28 PROCEDURE — 99214 OFFICE O/P EST MOD 30 MIN: CPT | Performed by: FAMILY MEDICINE

## 2019-01-28 RX ORDER — DESOXIMETASONE 2.5 MG/G
CREAM TOPICAL 2 TIMES DAILY
COMMUNITY
End: 2019-01-28 | Stop reason: SDUPTHER

## 2019-01-28 RX ORDER — CIPROFLOXACIN AND DEXAMETHASONE 3; 1 MG/ML; MG/ML
4 SUSPENSION/ DROPS AURICULAR (OTIC) 2 TIMES DAILY
COMMUNITY
End: 2019-01-28

## 2019-01-28 RX ORDER — CIPROFLOXACIN AND DEXAMETHASONE 3; 1 MG/ML; MG/ML
4 SUSPENSION/ DROPS AURICULAR (OTIC) 2 TIMES DAILY
Qty: 1 EACH | Refills: 3 | Status: CANCELLED | OUTPATIENT
Start: 2019-01-28

## 2019-01-28 RX ORDER — DESOXIMETASONE 2.5 MG/G
CREAM TOPICAL 2 TIMES DAILY
Qty: 1 EACH | Refills: 3 | Status: SHIPPED | OUTPATIENT
Start: 2019-01-28 | End: 2019-08-05 | Stop reason: SDUPTHER

## 2019-01-28 RX ORDER — LISINOPRIL 20 MG/1
20 TABLET ORAL DAILY
Qty: 90 TABLET | Refills: 1 | Status: SHIPPED | OUTPATIENT
Start: 2019-01-28 | End: 2019-08-05 | Stop reason: SDUPTHER

## 2019-01-28 NOTE — PROGRESS NOTES
Subjective   Chief Complaint:   Chief Complaint   Patient presents with   • Hypertension         History of Present Illness wants  meds filled for  prevention of an ear infection really for an external otitis on his ears and he also wants a refill of  Topicort  USP 0.25% ointment for rash on his feet.  And he wants a refill of lisinopril 20 mg daily.  I am going to give  Him a prescription for Cortisporin otic suspension to use his ears when necessary because the Cipro otic suspension that he had cost $400 for small bottle.  We did try the Cortisporin otic suspension which is a lot less expensive.  Really does not need it now he only wants to have in case he gets another external otitis.  pressure today is 139/81.I GOT /84  /78 AND I REVIEWED LABS.PSA LOW    is due  Prostate exam  when I see him in 6 months.            Darryn Baxter 63 y.o. male who presents today for Medical Management of the below listed issues and medication refills.    ICD-10-CM ICD-9-CM   1. Anxiety F41.9 300.00   2. Essential hypertension I10 401.9   3. Blindness of both eyes H54.3 369.00   4. Diverticulitis of intestine with perforation and abscess, unspecified bleeding status, unspecified part of intestinal tract K57.80 562.11     569.5   5. Impaired vision H54.7 369.9   6. Morbidly obese (CMS/[facility]) E66.01 278.01        he has a problem list of   Patient Active Problem List   Diagnosis   • Essential hypertension   • Anxiety   • Impaired vision   • Impaired fasting glucose   • Blindness of both eyes   • Diverticulitis of intestine with perforation and abscess   .  Since the last visit, he has overall felt well.  he has been compliant with   Current Outpatient Medications:   •  acetaminophen (TYLENOL) 500 MG tablet, Take 1,000 mg by mouth Every 6 (Six) Hours As Needed for Mild Pain ., Disp: , Rfl:   •  aspirin 325 MG tablet, Take 650 mg by mouth Every 6 (Six) Hours As Needed. TO STOP PTS, Disp: , Rfl:   •  calcium polycarbophil  "(FIBERCON) 625 MG tablet, Take 625 mg by mouth Daily., Disp: , Rfl:   •  desoximetasone (TOPICORT) 0.25 % cream, Apply  topically to the appropriate area as directed 2 (Two) Times a Day., Disp: 1 each, Rfl: 3  •  ibuprofen (ADVIL,MOTRIN) 200 MG tablet, Take 800 mg by mouth Every 6 (Six) Hours As Needed for Mild Pain . HELD FOR SURGERY, Disp: , Rfl:   •  lisinopril (PRINIVIL,ZESTRIL) 20 MG tablet, Take 1 tablet by mouth Daily., Disp: 90 tablet, Rfl: 1  •  vitamin C (ASCORBIC ACID) 500 MG tablet, Take 1,000 mg by mouth Daily., Disp: , Rfl: .  he denies medication side effects.    All of the chronic condition(s) listed above are stable w/o issues.    /81   Pulse 72   Temp 97.9 °F (36.6 °C)   Resp 16   Ht 175.3 cm (69\")   Wt 120 kg (265 lb)   SpO2 97%   BMI 39.13 kg/m²     Results for orders placed or performed in visit on 10/29/18   Comprehensive metabolic panel   Result Value Ref Range    Glucose 105 (H) 65 - 99 mg/dL    BUN 22 8 - 23 mg/dL    Creatinine 1.22 0.76 - 1.27 mg/dL    eGFR Non African Am 60 (L) >60 mL/min/1.73    eGFR African Am 72 >60 mL/min/1.73    BUN/Creatinine Ratio 18.0 7.0 - 25.0    Sodium 142 136 - 145 mmol/L    Potassium 4.9 3.5 - 5.2 mmol/L    Chloride 105 98 - 107 mmol/L    Total CO2 21.7 (L) 22.0 - 29.0 mmol/L    Calcium 9.5 8.6 - 10.5 mg/dL    Total Protein 7.2 6.0 - 8.5 g/dL    Albumin 4.50 3.50 - 5.20 g/dL    Globulin 2.7 gm/dL    A/G Ratio 1.7 g/dL    Total Bilirubin 0.3 0.1 - 1.2 mg/dL    Alkaline Phosphatase 59 39 - 117 U/L    AST (SGOT) 18 1 - 40 U/L    ALT (SGPT) 17 1 - 41 U/L   Lipid panel   Result Value Ref Range    Total Cholesterol 166 0 - 200 mg/dL    Triglycerides 171 (H) 0 - 150 mg/dL    HDL Cholesterol 32 (L) 40 - 60 mg/dL    VLDL Cholesterol 34.2 5 - 40 mg/dL    LDL Cholesterol  100 0 - 100 mg/dL   TSH   Result Value Ref Range    TSH 2.630 0.270 - 4.200 mIU/mL   PSA   Result Value Ref Range    PSA 0.224 0.000 - 4.000 ng/mL   CBC and Differential   Result Value Ref " Range    WBC 8.72 4.50 - 10.70 10*3/mm3    RBC 4.57 (L) 4.60 - 6.00 10*6/mm3    Hemoglobin 15.5 13.7 - 17.6 g/dL    Hematocrit 46.5 40.4 - 52.2 %    .8 (H) 79.8 - 96.2 fL    MCH 33.9 (H) 27.0 - 32.7 pg    MCHC 33.3 32.6 - 36.4 g/dL    RDW 13.2 11.5 - 14.5 %    Platelets 217 140 - 500 10*3/mm3    Neutrophil Rel % 43.7 42.7 - 76.0 %    Lymphocyte Rel % 35.1 19.6 - 45.3 %    Monocyte Rel % 10.2 5.0 - 12.0 %    Eosinophil Rel % 10.1 (H) 0.3 - 6.2 %    Basophil Rel % 0.6 0.0 - 1.5 %    Neutrophils Absolute 3.81 1.90 - 8.10 10*3/mm3    Lymphocytes Absolute 3.06 0.90 - 4.80 10*3/mm3    Monocytes Absolute 0.89 0.20 - 1.20 10*3/mm3    Eosinophils Absolute 0.88 (H) 0.00 - 0.70 10*3/mm3    Basophils Absolute 0.05 0.00 - 0.20 10*3/mm3    Immature Granulocyte Rel % 0.3 0.0 - 0.5 %    Immature Grans Absolute 0.03 0.00 - 0.03 10*3/mm3             The following portions of the patient's history were reviewed and updated as appropriate: allergies, current medications, past family history, past medical history, past social history, past surgical history and problem list.    Review of Systems   Constitutional: Negative for activity change, appetite change, fever and unexpected weight change.   HENT: Negative for congestion.    Eyes: Positive for visual disturbance.   Respiratory: Negative for apnea, chest tightness and shortness of breath.    Cardiovascular: Negative for chest pain and palpitations.   Gastrointestinal: Negative for abdominal pain.   Genitourinary: Negative for frequency.   Musculoskeletal: Negative for back pain.   Skin: Negative for color change and rash.   Neurological: Positive for headaches. Negative for dizziness, syncope, speech difficulty and weakness.   Psychiatric/Behavioral: Negative for confusion and decreased concentration. The patient is not nervous/anxious.        Objective   Physical Exam   Constitutional: He is oriented to person, place, and time. He appears well-developed and well-nourished.    HENT:   Head: Atraumatic.   Mouth/Throat: Oropharynx is clear and moist.   Eyes: EOM are normal. Pupils are equal, round, and reactive to light.   Vision decreased   Neck: Normal range of motion. Neck supple. No thyromegaly present.   Cardiovascular: Normal rate and regular rhythm.   Pulmonary/Chest: Effort normal and breath sounds normal.   Abdominal: Soft. There is no tenderness.   Musculoskeletal: Normal range of motion. He exhibits no edema.   Neurological: He is alert and oriented to person, place, and time. A sensory deficit is present.   Skin: Skin is warm and dry.   Psychiatric: He has a normal mood and affect. His behavior is normal.   Nursing note and vitals reviewed.      Assessment/Plan   Darryn was seen today for hypertension.    Diagnoses and all orders for this visit:    Anxiety    Essential hypertension  -     lisinopril (PRINIVIL,ZESTRIL) 20 MG tablet; Take 1 tablet by mouth Daily.    Blindness of both eyes    Diverticulitis of intestine with perforation and abscess, unspecified bleeding status, unspecified part of intestinal tract    Impaired vision    Morbidly obese (CMS/HCC)    Other orders  -     Cancel: ciprofloxacin-dexamethasone (CIPRODEX) 0.3-0.1 % otic suspension; Administer 4 drops into both ears 2 (Two) Times a Day.  -     desoximetasone (TOPICORT) 0.25 % cream; Apply  topically to the appropriate area as directed 2 (Two) Times a Day.

## 2019-05-10 DIAGNOSIS — F41.9 ANXIETY: ICD-10-CM

## 2019-05-10 DIAGNOSIS — R73.01 IMPAIRED FASTING GLUCOSE: ICD-10-CM

## 2019-05-10 DIAGNOSIS — I10 ESSENTIAL HYPERTENSION: Primary | ICD-10-CM

## 2019-05-10 DIAGNOSIS — E78.1 HIGH TRIGLYCERIDES: ICD-10-CM

## 2019-07-22 ENCOUNTER — RESULTS ENCOUNTER (OUTPATIENT)
Dept: FAMILY MEDICINE CLINIC | Facility: CLINIC | Age: 64
End: 2019-07-22

## 2019-07-22 DIAGNOSIS — I10 ESSENTIAL HYPERTENSION: ICD-10-CM

## 2019-07-22 DIAGNOSIS — R73.01 IMPAIRED FASTING GLUCOSE: ICD-10-CM

## 2019-07-22 DIAGNOSIS — E78.1 HIGH TRIGLYCERIDES: ICD-10-CM

## 2019-08-02 LAB
ALBUMIN SERPL-MCNC: 4.5 G/DL (ref 3.5–5.2)
ALBUMIN/GLOB SERPL: 1.6 G/DL
ALP SERPL-CCNC: 78 U/L (ref 39–117)
ALT SERPL-CCNC: 15 U/L (ref 1–41)
AST SERPL-CCNC: 19 U/L (ref 1–40)
BILIRUB SERPL-MCNC: 0.3 MG/DL (ref 0.2–1.2)
BUN SERPL-MCNC: 16 MG/DL (ref 8–23)
BUN/CREAT SERPL: 17.6 (ref 7–25)
CALCIUM SERPL-MCNC: 9.2 MG/DL (ref 8.6–10.5)
CHLORIDE SERPL-SCNC: 105 MMOL/L (ref 98–107)
CHOLEST SERPL-MCNC: 151 MG/DL (ref 0–200)
CO2 SERPL-SCNC: 24.9 MMOL/L (ref 22–29)
CREAT SERPL-MCNC: 0.91 MG/DL (ref 0.76–1.27)
GLOBULIN SER CALC-MCNC: 2.8 GM/DL
GLUCOSE SERPL-MCNC: 117 MG/DL (ref 65–99)
HDLC SERPL-MCNC: 32 MG/DL (ref 40–60)
LDLC SERPL CALC-MCNC: 98 MG/DL (ref 0–100)
POTASSIUM SERPL-SCNC: 4.6 MMOL/L (ref 3.5–5.2)
PROT SERPL-MCNC: 7.3 G/DL (ref 6–8.5)
SODIUM SERPL-SCNC: 143 MMOL/L (ref 136–145)
TRIGL SERPL-MCNC: 105 MG/DL (ref 0–150)
VLDLC SERPL CALC-MCNC: 21 MG/DL

## 2019-08-05 ENCOUNTER — OFFICE VISIT (OUTPATIENT)
Dept: FAMILY MEDICINE CLINIC | Facility: CLINIC | Age: 64
End: 2019-08-05

## 2019-08-05 VITALS
HEART RATE: 78 BPM | DIASTOLIC BLOOD PRESSURE: 89 MMHG | HEIGHT: 69 IN | SYSTOLIC BLOOD PRESSURE: 135 MMHG | TEMPERATURE: 97.9 F | BODY MASS INDEX: 37.47 KG/M2 | RESPIRATION RATE: 16 BRPM | WEIGHT: 253 LBS | OXYGEN SATURATION: 93 %

## 2019-08-05 DIAGNOSIS — R73.01 IMPAIRED FASTING GLUCOSE: Primary | ICD-10-CM

## 2019-08-05 DIAGNOSIS — H54.3 BLINDNESS OF BOTH EYES: ICD-10-CM

## 2019-08-05 DIAGNOSIS — R39.14 FEELING OF INCOMPLETE BLADDER EMPTYING: ICD-10-CM

## 2019-08-05 DIAGNOSIS — I10 ESSENTIAL HYPERTENSION: ICD-10-CM

## 2019-08-05 DIAGNOSIS — Z12.5 SCREENING FOR PROSTATE CANCER: ICD-10-CM

## 2019-08-05 PROCEDURE — 99214 OFFICE O/P EST MOD 30 MIN: CPT | Performed by: FAMILY MEDICINE

## 2019-08-05 RX ORDER — DESOXIMETASONE 2.5 MG/G
CREAM TOPICAL 2 TIMES DAILY
Qty: 1 EACH | Refills: 3 | Status: SHIPPED | OUTPATIENT
Start: 2019-08-05 | End: 2020-02-03 | Stop reason: SDUPTHER

## 2019-08-05 RX ORDER — LISINOPRIL 20 MG/1
20 TABLET ORAL DAILY
Qty: 90 TABLET | Refills: 1 | Status: SHIPPED | OUTPATIENT
Start: 2019-08-05 | End: 2020-02-03 | Stop reason: SDUPTHER

## 2019-08-05 NOTE — PROGRESS NOTES
Subjective   Chief Complaint:   Chief Complaint   Patient presents with   • Hypertension       Darryn Baxter 64 y.o. male who presents for an session of his shingles shot and the pneumonia shot I do not see any evidence of the pneumonia shot but I do see when Shingrix so is going to call the drugstore tomorrow and find out about this  he has a history of also we discussed about the Prevnar densities had that but is going to check with the pharmacy.  And they were also doing a preventive exam in a few months we will get the labs for that exam.  Patient Active Problem List   Diagnosis   • Essential hypertension   • Anxiety   • Impaired vision   • Impaired fasting glucose   • Blindness of both eyes   • Diverticulitis of intestine with perforation and abscess   .  Labs results discussed in detail with the patient, if no recent labs were done, order placed today.  Plan to update vaccines if needed today.  I  reviewed health maintenance with him as part of my preventative care plan.  History of Present Illness is and his hemoglobin A1c has showed impaired fasting glucose and his blood sugar this time was 117.  Filled his medications namely Topicort 0.25% cream and lisinopril 20 mg 1 a day.  Patient has remained stable.  His visual impairment is about the same in both eyes.  And again his blood sugar was only slightly elevated but hemoglobin A1c in the past did show impaired fasting glucose.  Viewed his medications in detail his blood pressure was 135/89.  Set up a physical for 6 months from now            Darryn Baxter 64 y.o. male who presents today for Medical Management of the below listed issues and medication refills.    ICD-10-CM ICD-9-CM   1. Essential hypertension I10 401.9        he has a problem list of   Patient Active Problem List   Diagnosis   • Essential hypertension   • Anxiety   • Impaired vision   • Impaired fasting glucose   • Blindness of both eyes   • Diverticulitis of intestine with perforation and  "abscess   .  Since the last visit, he has overall felt well.  he has been compliant with   Current Outpatient Medications:   •  acetaminophen (TYLENOL) 500 MG tablet, Take 1,000 mg by mouth Every 6 (Six) Hours As Needed for Mild Pain ., Disp: , Rfl:   •  aspirin 325 MG tablet, Take 650 mg by mouth Every 6 (Six) Hours As Needed. TO STOP PTS, Disp: , Rfl:   •  calcium polycarbophil (FIBERCON) 625 MG tablet, Take 625 mg by mouth Daily., Disp: , Rfl:   •  desoximetasone (TOPICORT) 0.25 % cream, Apply  topically to the appropriate area as directed 2 (Two) Times a Day., Disp: 1 each, Rfl: 3  •  ibuprofen (ADVIL,MOTRIN) 200 MG tablet, Take 800 mg by mouth Every 6 (Six) Hours As Needed for Mild Pain . HELD FOR SURGERY, Disp: , Rfl:   •  lisinopril (PRINIVIL,ZESTRIL) 20 MG tablet, Take 1 tablet by mouth Daily., Disp: 90 tablet, Rfl: 1  •  vitamin C (ASCORBIC ACID) 500 MG tablet, Take 1,000 mg by mouth Daily., Disp: , Rfl: .  he denies medication side effects.    All of the chronic condition(s) listed above are stable w/o issues.    /89   Pulse 78   Temp 97.9 °F (36.6 °C)   Resp 16   Ht 175.3 cm (69\")   Wt 115 kg (253 lb)   SpO2 93%   BMI 37.36 kg/m²     Results for orders placed or performed in visit on 07/22/19   Comprehensive metabolic panel   Result Value Ref Range    Glucose 117 (H) 65 - 99 mg/dL    BUN 16 8 - 23 mg/dL    Creatinine 0.91 0.76 - 1.27 mg/dL    eGFR Non African Am 83 >60 mL/min/1.73    eGFR African Am 101 >60 mL/min/1.73    BUN/Creatinine Ratio 17.6 7.0 - 25.0    Sodium 143 136 - 145 mmol/L    Potassium 4.6 3.5 - 5.2 mmol/L    Chloride 105 98 - 107 mmol/L    Total CO2 24.9 22.0 - 29.0 mmol/L    Calcium 9.2 8.6 - 10.5 mg/dL    Total Protein 7.3 6.0 - 8.5 g/dL    Albumin 4.50 3.50 - 5.20 g/dL    Globulin 2.8 gm/dL    A/G Ratio 1.6 g/dL    Total Bilirubin 0.3 0.2 - 1.2 mg/dL    Alkaline Phosphatase 78 39 - 117 U/L    AST (SGOT) 19 1 - 40 U/L    ALT (SGPT) 15 1 - 41 U/L   Lipid panel   Result Value " Ref Range    Total Cholesterol 151 0 - 200 mg/dL    Triglycerides 105 0 - 150 mg/dL    HDL Cholesterol 32 (L) 40 - 60 mg/dL    VLDL Cholesterol 21 mg/dL    LDL Cholesterol  98 0 - 100 mg/dL             The following portions of the patient's history were reviewed and updated as appropriate: allergies, current medications, past family history, past medical history, past social history, past surgical history and problem list.    Review of Systems   Constitutional: Negative for activity change, appetite change and unexpected weight change.   Eyes: Positive for visual disturbance.        His visual impairment remains the same.   Respiratory: Negative for chest tightness and shortness of breath.    Cardiovascular: Negative for chest pain and palpitations.   Gastrointestinal: Negative for abdominal pain.   Genitourinary: Negative for difficulty urinating and frequency.   Musculoskeletal: Negative for back pain.   Skin: Negative for color change.   Neurological: Negative for dizziness, syncope and speech difficulty.   Psychiatric/Behavioral: Negative for confusion and decreased concentration.       Objective   Physical Exam   Constitutional: He is oriented to person, place, and time. He appears well-developed and well-nourished.   HENT:   Head: Atraumatic.   Mouth/Throat: Oropharynx is clear and moist.   Eyes: EOM are normal. Pupils are equal, round, and reactive to light.   Neck: Normal range of motion. Neck supple. No thyromegaly present.   Cardiovascular: Normal rate and regular rhythm.   Pulmonary/Chest: Effort normal and breath sounds normal.   Abdominal: Soft.   Musculoskeletal: Normal range of motion.   Neurological: He is alert and oriented to person, place, and time.   Skin: Skin is warm and dry.   Psychiatric: He has a normal mood and affect. His behavior is normal.   Nursing note and vitals reviewed.      Assessment/Plan   Darryn was seen today for hypertension.    Diagnoses and all orders for this  visit:    Essential hypertension

## 2020-01-06 ENCOUNTER — OFFICE VISIT (OUTPATIENT)
Dept: RETAIL CLINIC | Facility: CLINIC | Age: 65
End: 2020-01-06

## 2020-01-06 VITALS
TEMPERATURE: 98 F | HEART RATE: 76 BPM | DIASTOLIC BLOOD PRESSURE: 80 MMHG | OXYGEN SATURATION: 94 % | SYSTOLIC BLOOD PRESSURE: 130 MMHG

## 2020-01-06 DIAGNOSIS — J40 BRONCHITIS: Primary | ICD-10-CM

## 2020-01-06 PROCEDURE — 99213 OFFICE O/P EST LOW 20 MIN: CPT | Performed by: NURSE PRACTITIONER

## 2020-01-06 RX ORDER — ALBUTEROL SULFATE 90 UG/1
2 AEROSOL, METERED RESPIRATORY (INHALATION) EVERY 4 HOURS PRN
Qty: 1 INHALER | Refills: 0 | Status: SHIPPED | OUTPATIENT
Start: 2020-01-06 | End: 2021-02-08 | Stop reason: SDUPTHER

## 2020-01-06 RX ORDER — DOXYCYCLINE 100 MG/1
100 CAPSULE ORAL 2 TIMES DAILY
Qty: 14 CAPSULE | Refills: 0 | Status: SHIPPED | OUTPATIENT
Start: 2020-01-06 | End: 2020-01-13

## 2020-01-06 RX ORDER — PREDNISONE 20 MG/1
20 TABLET ORAL 2 TIMES DAILY
Qty: 10 TABLET | Refills: 0 | Status: SHIPPED | OUTPATIENT
Start: 2020-01-06 | End: 2020-08-03

## 2020-01-06 NOTE — PROGRESS NOTES
Subjective:     Darryn Baxter is a 64 y.o.     Cough   Episode onset: started 5 days ago. The cough is productive of sputum. Associated symptoms include a fever, nasal congestion, postnasal drip, a sore throat, shortness of breath and wheezing. Pertinent negatives include no ear pain. Treatments tried: mucinex, albuterol inhaler, advil cold and sinus, ibuprofen. The treatment provided mild relief.         The following portions of the patient's history were reviewed and updated as appropriate: allergies, current medications, past family history, past medical history, past social history, past surgical history and problem list.      Review of Systems   Constitutional: Positive for fever.   HENT: Positive for congestion, postnasal drip and sore throat. Negative for ear pain.    Respiratory: Positive for cough, shortness of breath and wheezing.    Cardiovascular:        Hx: hypertension         Objective:      Physical Exam   Constitutional: He appears well-developed and well-nourished.   HENT:   Head: Normocephalic and atraumatic.   Right Ear: Tympanic membrane and ear canal normal.   Left Ear: Ear canal normal.   Mouth/Throat: No oropharyngeal exudate.   miild nasal congestion, thick purulent post nasal drainage noted   Cardiovascular: Normal rate, regular rhythm, S1 normal, S2 normal and normal heart sounds.   Pulmonary/Chest: Effort normal. He has wheezes in the right upper field and the left upper field. He has rhonchi in the right upper field, the right middle field and the left upper field.   Frequent cough at visit   Lymphadenopathy:     He has no cervical adenopathy.   Vitals reviewed.          Diagnoses and all orders for this visit:    Bronchitis    Other orders  -     albuterol sulfate  (90 Base) MCG/ACT inhaler; Inhale 2 puffs Every 4 (Four) Hours As Needed for Wheezing.  -     doxycycline (MONODOX) 100 MG capsule; Take 1 capsule by mouth 2 (Two) Times a Day for 7 days.  -     predniSONE (DELTASONE)  20 MG tablet; Take 1 tablet by mouth 2 (Two) Times a Day.

## 2020-01-28 LAB
ALBUMIN SERPL-MCNC: 4.5 G/DL (ref 3.8–4.8)
ALBUMIN/GLOB SERPL: 2.1 {RATIO} (ref 1.2–2.2)
ALP SERPL-CCNC: 63 IU/L (ref 39–117)
ALT SERPL-CCNC: 14 IU/L (ref 0–44)
APPEARANCE UR: CLEAR
AST SERPL-CCNC: 15 IU/L (ref 0–40)
BACTERIA #/AREA URNS HPF: ABNORMAL /[HPF]
BASOPHILS # BLD AUTO: 0 X10E3/UL (ref 0–0.2)
BASOPHILS NFR BLD AUTO: 0 %
BILIRUB SERPL-MCNC: 0.4 MG/DL (ref 0–1.2)
BILIRUB UR QL STRIP: NEGATIVE
BUN SERPL-MCNC: 18 MG/DL (ref 8–27)
BUN/CREAT SERPL: 15 (ref 10–24)
CALCIUM SERPL-MCNC: 9.3 MG/DL (ref 8.6–10.2)
CASTS URNS MICRO: ABNORMAL
CASTS URNS QL MICRO: PRESENT /LPF
CHLORIDE SERPL-SCNC: 102 MMOL/L (ref 96–106)
CHOLEST SERPL-MCNC: 165 MG/DL (ref 100–199)
CO2 SERPL-SCNC: 23 MMOL/L (ref 20–29)
COLOR UR: YELLOW
CREAT SERPL-MCNC: 1.17 MG/DL (ref 0.76–1.27)
EOSINOPHIL # BLD AUTO: 0.4 X10E3/UL (ref 0–0.4)
EOSINOPHIL NFR BLD AUTO: 5 %
EPI CELLS #/AREA URNS HPF: ABNORMAL /HPF (ref 0–10)
ERYTHROCYTE [DISTWIDTH] IN BLOOD BY AUTOMATED COUNT: 13.3 % (ref 11.6–15.4)
GLOBULIN SER CALC-MCNC: 2.1 G/DL (ref 1.5–4.5)
GLUCOSE SERPL-MCNC: 103 MG/DL (ref 65–99)
GLUCOSE UR QL: NEGATIVE
HBA1C MFR BLD: 5.8 % (ref 4.8–5.6)
HCT VFR BLD AUTO: 45.6 % (ref 37.5–51)
HDLC SERPL-MCNC: 34 MG/DL
HGB BLD-MCNC: 15.7 G/DL (ref 13–17.7)
HGB UR QL STRIP: NEGATIVE
IMM GRANULOCYTES # BLD AUTO: 0 X10E3/UL (ref 0–0.1)
IMM GRANULOCYTES NFR BLD AUTO: 0 %
KETONES UR QL STRIP: NEGATIVE
LDLC SERPL CALC-MCNC: 108 MG/DL (ref 0–99)
LEUKOCYTE ESTERASE UR QL STRIP: NEGATIVE
LYMPHOCYTES # BLD AUTO: 2.4 X10E3/UL (ref 0.7–3.1)
LYMPHOCYTES NFR BLD AUTO: 25 %
MCH RBC QN AUTO: 33.3 PG (ref 26.6–33)
MCHC RBC AUTO-ENTMCNC: 34.4 G/DL (ref 31.5–35.7)
MCV RBC AUTO: 97 FL (ref 79–97)
MICRO URNS: NORMAL
MICRO URNS: NORMAL
MONOCYTES # BLD AUTO: 0.9 X10E3/UL (ref 0.1–0.9)
MONOCYTES NFR BLD AUTO: 9 %
MUCOUS THREADS URNS QL MICRO: PRESENT
NEUTROPHILS # BLD AUTO: 5.8 X10E3/UL (ref 1.4–7)
NEUTROPHILS NFR BLD AUTO: 61 %
NITRITE UR QL STRIP: NEGATIVE
PH UR STRIP: 5 [PH] (ref 5–7.5)
PLATELET # BLD AUTO: 195 X10E3/UL (ref 150–450)
POTASSIUM SERPL-SCNC: 5.3 MMOL/L (ref 3.5–5.2)
PROT SERPL-MCNC: 6.6 G/DL (ref 6–8.5)
PROT UR QL STRIP: NEGATIVE
PSA SERPL-MCNC: 0.3 NG/ML (ref 0–4)
RBC # BLD AUTO: 4.71 X10E6/UL (ref 4.14–5.8)
RBC #/AREA URNS HPF: ABNORMAL /HPF (ref 0–2)
SODIUM SERPL-SCNC: 140 MMOL/L (ref 134–144)
SP GR UR: 1.02 (ref 1–1.03)
TRIGL SERPL-MCNC: 117 MG/DL (ref 0–149)
TSH SERPL DL<=0.005 MIU/L-ACNC: 2.71 UIU/ML (ref 0.45–4.5)
UROBILINOGEN UR STRIP-MCNC: 0.2 MG/DL (ref 0.2–1)
VLDLC SERPL CALC-MCNC: 23 MG/DL (ref 5–40)
WBC # BLD AUTO: 9.5 X10E3/UL (ref 3.4–10.8)
WBC #/AREA URNS HPF: ABNORMAL /HPF (ref 0–5)

## 2020-02-03 ENCOUNTER — OFFICE VISIT (OUTPATIENT)
Dept: FAMILY MEDICINE CLINIC | Facility: CLINIC | Age: 65
End: 2020-02-03

## 2020-02-03 VITALS
RESPIRATION RATE: 16 BRPM | SYSTOLIC BLOOD PRESSURE: 124 MMHG | HEIGHT: 69 IN | TEMPERATURE: 98.6 F | WEIGHT: 248 LBS | OXYGEN SATURATION: 98 % | BODY MASS INDEX: 36.73 KG/M2 | HEART RATE: 98 BPM | DIASTOLIC BLOOD PRESSURE: 80 MMHG

## 2020-02-03 DIAGNOSIS — I10 ESSENTIAL HYPERTENSION: ICD-10-CM

## 2020-02-03 PROCEDURE — 99213 OFFICE O/P EST LOW 20 MIN: CPT | Performed by: FAMILY MEDICINE

## 2020-02-03 RX ORDER — DESOXIMETASONE 2.5 MG/G
CREAM TOPICAL 2 TIMES DAILY
Qty: 1 EACH | Refills: 3 | Status: SHIPPED | OUTPATIENT
Start: 2020-02-03 | End: 2020-08-03 | Stop reason: SDUPTHER

## 2020-02-03 RX ORDER — LISINOPRIL 20 MG/1
20 TABLET ORAL DAILY
Qty: 90 TABLET | Refills: 1 | Status: SHIPPED | OUTPATIENT
Start: 2020-02-03 | End: 2020-07-09 | Stop reason: SDUPTHER

## 2020-02-03 NOTE — PROGRESS NOTES
Subjective   Chief Complaint:   Chief Complaint   Patient presents with   • Hypertension         History of Present Illness reviewed all his labs and they are good I am going to refill his lisinopril 20 mg and his Topicort 0.25% he puts the Topicort 0.25% on his feet as needed and then he also uses a lotion on his toes especially the dry scaly skin.  Patient says at the present time he does not need a PSA or prostate exam he ate something for food and it made him have rectal bleeding and he has pain at that area so he wants to wait on a prostate exam I did get the labs this visit and his PSA is good 0.3 and his blood sugar is 103 and his hemoglobin A1c is 5.8 and his urine is negative and a pro-20 mg 1 a day feet look pretty good they are a little dry and scaly but I do not see any sores on his feet and then he deferred the prostate exam to next visit      Past Medical History:   Diagnosis Date   • Abscess     COLONIC   • Arthritis    • Floaters     VISION BETTER IN RIGHT VS LEFT   • Gastric reflux     ON OCCASION   • History of cellulitis     LEFT ANKLE ABOUT 10 YRS AGO   • History of migraine     VERY LIGHT SENSITIVE   • Hypertension    • Lumbar herniated disc    • RLF (retrolental fibroplasia)     SINCE CHILDHOOD   • Vision impairment         Darryn Baxter 64 y.o. male who presents today for Medical Management of the below listed issues and medication refills.      ICD-10-CM ICD-9-CM   1. Essential hypertension I10 401.9        he has a problem list of   Patient Active Problem List   Diagnosis   • Essential hypertension   • Anxiety   • Impaired vision   • Impaired fasting glucose   • Blindness of both eyes   • Diverticulitis of intestine with perforation and abscess   .    he has been compliant with   Current Outpatient Medications:   •  albuterol sulfate  (90 Base) MCG/ACT inhaler, Inhale 2 puffs Every 4 (Four) Hours As Needed for Wheezing., Disp: 1 inhaler, Rfl: 0  •  calcium polycarbophil (FIBERCON) 625  "MG tablet, Take 625 mg by mouth Daily., Disp: , Rfl:   •  desoximetasone (TOPICORT) 0.25 % cream, Apply  topically to the appropriate area as directed 2 (Two) Times a Day., Disp: 1 each, Rfl: 3  •  ibuprofen (ADVIL,MOTRIN) 200 MG tablet, Take 800 mg by mouth Every 6 (Six) Hours As Needed for Mild Pain . HELD FOR SURGERY, Disp: , Rfl:   •  lisinopril (PRINIVIL,ZESTRIL) 20 MG tablet, Take 1 tablet by mouth Daily., Disp: 90 tablet, Rfl: 1  •  predniSONE (DELTASONE) 20 MG tablet, Take 1 tablet by mouth 2 (Two) Times a Day., Disp: 10 tablet, Rfl: 0  •  vitamin C (ASCORBIC ACID) 500 MG tablet, Take 1,000 mg by mouth Daily., Disp: , Rfl: .  he denies medication side effects.        /80   Pulse 98   Temp 98.6 °F (37 °C)   Resp 16   Ht 175.3 cm (69\")   Wt 112 kg (248 lb)   SpO2 98%   BMI 36.62 kg/m²     Results for orders placed or performed in visit on 08/05/19   Comprehensive metabolic panel   Result Value Ref Range    Glucose 103 (H) 65 - 99 mg/dL    BUN 18 8 - 27 mg/dL    Creatinine 1.17 0.76 - 1.27 mg/dL    eGFR Non African Am 65 >59 mL/min/1.73    eGFR African Am 75 >59 mL/min/1.73    BUN/Creatinine Ratio 15 10 - 24    Sodium 140 134 - 144 mmol/L    Potassium 5.3 (H) 3.5 - 5.2 mmol/L    Chloride 102 96 - 106 mmol/L    Total CO2 23 20 - 29 mmol/L    Calcium 9.3 8.6 - 10.2 mg/dL    Total Protein 6.6 6.0 - 8.5 g/dL    Albumin 4.5 3.8 - 4.8 g/dL    Globulin 2.1 1.5 - 4.5 g/dL    A/G Ratio 2.1 1.2 - 2.2    Total Bilirubin 0.4 0.0 - 1.2 mg/dL    Alkaline Phosphatase 63 39 - 117 IU/L    AST (SGOT) 15 0 - 40 IU/L    ALT (SGPT) 14 0 - 44 IU/L   Lipid panel   Result Value Ref Range    Total Cholesterol 165 100 - 199 mg/dL    Triglycerides 117 0 - 149 mg/dL    HDL Cholesterol 34 (L) >39 mg/dL    VLDL Cholesterol 23 5 - 40 mg/dL    LDL Cholesterol  108 (H) 0 - 99 mg/dL   TSH   Result Value Ref Range    TSH 2.710 0.450 - 4.500 uIU/mL   Hemoglobin A1c   Result Value Ref Range    Hemoglobin A1C 5.8 (H) 4.8 - 5.6 %   PSA " DIAGNOSTIC   Result Value Ref Range    PSA 0.3 0.0 - 4.0 ng/mL   Microscopic Examination -   Result Value Ref Range    WBC, UA 0-5 0 - 5 /hpf    RBC, UA 0-2 0 - 2 /hpf    Epithelial Cells (non renal) 0-10 0 - 10 /hpf    Casts Present (A) None seen /lpf    Cast Type Hyaline casts N/A    Mucus, UA Present Not Estab.    Bacteria, UA None seen None seen/Few   CBC and Differential   Result Value Ref Range    WBC 9.5 3.4 - 10.8 x10E3/uL    RBC 4.71 4.14 - 5.80 x10E6/uL    Hemoglobin 15.7 13.0 - 17.7 g/dL    Hematocrit 45.6 37.5 - 51.0 %    MCV 97 79 - 97 fL    MCH 33.3 (H) 26.6 - 33.0 pg    MCHC 34.4 31.5 - 35.7 g/dL    RDW 13.3 11.6 - 15.4 %    Platelets 195 150 - 450 x10E3/uL    Neutrophil Rel % 61 Not Estab. %    Lymphocyte Rel % 25 Not Estab. %    Monocyte Rel % 9 Not Estab. %    Eosinophil Rel % 5 Not Estab. %    Basophil Rel % 0 Not Estab. %    Neutrophils Absolute 5.8 1.4 - 7.0 x10E3/uL    Lymphocytes Absolute 2.4 0.7 - 3.1 x10E3/uL    Monocytes Absolute 0.9 0.1 - 0.9 x10E3/uL    Eosinophils Absolute 0.4 0.0 - 0.4 x10E3/uL    Basophils Absolute 0.0 0.0 - 0.2 x10E3/uL    Immature Granulocyte Rel % 0 Not Estab. %    Immature Grans Absolute 0.0 0.0 - 0.1 x10E3/uL   Urinalysis With Microscopic - Urine, Clean Catch   Result Value Ref Range    Specific Gravity, UA 1.020 1.005 - 1.030    pH, UA 5.0 5.0 - 7.5    Color, UA Yellow Yellow    Appearance, UA Clear Clear    Leukocytes, UA Negative Negative    Protein Negative Negative/Trace    Glucose, UA Negative Negative    Ketones Negative Negative    Blood, UA Negative Negative    Bilirubin, UA Negative Negative    Urobilinogen, UA 0.2 0.2 - 1.0 mg/dL    Nitrite, UA Negative Negative    Microscopic Examination Comment     Microscopic Examination See below:        The following portions of the patient's history were reviewed and updated as appropriate: allergies, current medications, past family history, past medical history, past social history, past surgical history and  problem list.      he has a history of   Patient Active Problem List   Diagnosis   • Essential hypertension   • Anxiety   • Impaired vision   • Impaired fasting glucose   • Blindness of both eyes   • Diverticulitis of intestine with perforation and abscess       Review of Systems   Constitutional: Negative for activity change, appetite change and unexpected weight change.   Eyes: Negative for visual disturbance.   Respiratory: Negative for chest tightness and shortness of breath.    Cardiovascular: Negative for chest pain and palpitations.   Skin: Negative for color change.   Neurological: Negative for syncope and speech difficulty.   Psychiatric/Behavioral: Negative for confusion and decreased concentration.       Objective   Physical Exam   Constitutional: He is oriented to person, place, and time. He appears well-developed and well-nourished.   HENT:   Head: Atraumatic.   Mouth/Throat: Oropharynx is clear and moist.   Eyes: Pupils are equal, round, and reactive to light. EOM are normal.   Neck: Normal range of motion. Neck supple. No thyromegaly present.   Cardiovascular: Normal rate and regular rhythm.   Pulmonary/Chest: Effort normal and breath sounds normal.   Abdominal: Soft.   Musculoskeletal: Normal range of motion.   Neurological: He is alert and oriented to person, place, and time.   Skin: Skin is warm and dry.   Psychiatric: He has a normal mood and affect. His behavior is normal.   Nursing note and vitals reviewed.      Assessment/Plan   Darryn was seen today for hypertension.    Diagnoses and all orders for this visit:    Essential hypertension  -     lisinopril (PRINIVIL,ZESTRIL) 20 MG tablet; Take 1 tablet by mouth Daily.    Other orders  -     desoximetasone (TOPICORT) 0.25 % cream; Apply  topically to the appropriate area as directed 2 (Two) Times a Day.      Scaly rash feet

## 2020-02-21 ENCOUNTER — HOSPITAL ENCOUNTER (INPATIENT)
Facility: HOSPITAL | Age: 65
LOS: 2 days | Discharge: HOME | End: 2020-02-23
Attending: EMERGENCY MEDICINE | Admitting: SURGERY

## 2020-02-21 ENCOUNTER — APPOINTMENT (OUTPATIENT)
Dept: GENERAL RADIOLOGY | Facility: HOSPITAL | Age: 65
End: 2020-02-21

## 2020-02-21 ENCOUNTER — ANESTHESIA (OUTPATIENT)
Dept: PERIOP | Facility: HOSPITAL | Age: 65
End: 2020-02-21

## 2020-02-21 ENCOUNTER — ANESTHESIA EVENT (OUTPATIENT)
Dept: PERIOP | Facility: HOSPITAL | Age: 65
End: 2020-02-21

## 2020-02-21 DIAGNOSIS — I71.30 RUPTURED ABDOMINAL AORTIC ANEURYSM (AAA) (HCC): Primary | ICD-10-CM

## 2020-02-21 LAB
ABO GROUP BLD: NORMAL
ALBUMIN SERPL-MCNC: 4.6 G/DL (ref 3.5–5.2)
ALBUMIN/GLOB SERPL: 1.7 G/DL
ALP SERPL-CCNC: 55 U/L (ref 39–117)
ALT SERPL W P-5'-P-CCNC: 12 U/L (ref 1–41)
ANION GAP SERPL CALCULATED.3IONS-SCNC: 14.7 MMOL/L (ref 5–15)
AST SERPL-CCNC: 13 U/L (ref 1–40)
BASOPHILS # BLD AUTO: 0.04 10*3/MM3 (ref 0–0.2)
BASOPHILS NFR BLD AUTO: 0.4 % (ref 0–1.5)
BILIRUB SERPL-MCNC: 0.7 MG/DL (ref 0.2–1.2)
BLD GP AB SCN SERPL QL: NEGATIVE
BUN BLD-MCNC: 23 MG/DL (ref 8–23)
BUN/CREAT SERPL: 19 (ref 7–25)
CALCIUM SPEC-SCNC: 9.5 MG/DL (ref 8.6–10.5)
CHLORIDE SERPL-SCNC: 102 MMOL/L (ref 98–107)
CO2 SERPL-SCNC: 22.3 MMOL/L (ref 22–29)
CREAT BLD-MCNC: 1.21 MG/DL (ref 0.76–1.27)
DEPRECATED RDW RBC AUTO: 48.4 FL (ref 37–54)
EOSINOPHIL # BLD AUTO: 0.48 10*3/MM3 (ref 0–0.4)
EOSINOPHIL NFR BLD AUTO: 4.6 % (ref 0.3–6.2)
ERYTHROCYTE [DISTWIDTH] IN BLOOD BY AUTOMATED COUNT: 13.4 % (ref 12.3–15.4)
GFR SERPL CREATININE-BSD FRML MDRD: 60 ML/MIN/1.73
GLOBULIN UR ELPH-MCNC: 2.7 GM/DL
GLUCOSE BLD-MCNC: 102 MG/DL (ref 65–99)
HCT VFR BLD AUTO: 44.4 % (ref 37.5–51)
HGB BLD-MCNC: 15 G/DL (ref 13–17.7)
IMM GRANULOCYTES # BLD AUTO: 0.05 10*3/MM3 (ref 0–0.05)
IMM GRANULOCYTES NFR BLD AUTO: 0.5 % (ref 0–0.5)
INR PPP: 1.04 (ref 0.9–1.1)
LIPASE SERPL-CCNC: 23 U/L (ref 13–60)
LYMPHOCYTES # BLD AUTO: 2.53 10*3/MM3 (ref 0.7–3.1)
LYMPHOCYTES NFR BLD AUTO: 24.3 % (ref 19.6–45.3)
MCH RBC QN AUTO: 33.1 PG (ref 26.6–33)
MCHC RBC AUTO-ENTMCNC: 33.8 G/DL (ref 31.5–35.7)
MCV RBC AUTO: 98 FL (ref 79–97)
MONOCYTES # BLD AUTO: 1.07 10*3/MM3 (ref 0.1–0.9)
MONOCYTES NFR BLD AUTO: 10.3 % (ref 5–12)
NEUTROPHILS # BLD AUTO: 6.23 10*3/MM3 (ref 1.7–7)
NEUTROPHILS NFR BLD AUTO: 59.9 % (ref 42.7–76)
NRBC BLD AUTO-RTO: 0 /100 WBC (ref 0–0.2)
PLATELET # BLD AUTO: 195 10*3/MM3 (ref 140–450)
PMV BLD AUTO: 9.8 FL (ref 6–12)
POTASSIUM BLD-SCNC: 4.2 MMOL/L (ref 3.5–5.2)
PROT SERPL-MCNC: 7.3 G/DL (ref 6–8.5)
PROTHROMBIN TIME: 13.3 SECONDS (ref 11.7–14.2)
RBC # BLD AUTO: 4.53 10*6/MM3 (ref 4.14–5.8)
RH BLD: POSITIVE
SODIUM BLD-SCNC: 139 MMOL/L (ref 136–145)
T&S EXPIRATION DATE: NORMAL
TROPONIN T SERPL-MCNC: 0.01 NG/ML (ref 0–0.03)
WBC NRBC COR # BLD: 10.4 10*3/MM3 (ref 3.4–10.8)

## 2020-02-21 PROCEDURE — C1769 GUIDE WIRE: HCPCS | Performed by: SURGERY

## 2020-02-21 PROCEDURE — C1894 INTRO/SHEATH, NON-LASER: HCPCS | Performed by: SURGERY

## 2020-02-21 PROCEDURE — 25010000002 NEOSTIGMINE PER 0.5 MG: Performed by: ANESTHESIOLOGY

## 2020-02-21 PROCEDURE — C2628 CATHETER, OCCLUSION: HCPCS | Performed by: SURGERY

## 2020-02-21 PROCEDURE — 25010000002 PROTAMINE SULFATE PER 10 MG: Performed by: ANESTHESIOLOGY

## 2020-02-21 PROCEDURE — 25010000002 HEPARIN (PORCINE) PER 1000 UNITS: Performed by: SURGERY

## 2020-02-21 PROCEDURE — 25010000002 PHENYLEPHRINE PER 1 ML: Performed by: ANESTHESIOLOGY

## 2020-02-21 PROCEDURE — 25010000002 ONDANSETRON PER 1 MG: Performed by: ANESTHESIOLOGY

## 2020-02-21 PROCEDURE — 84484 ASSAY OF TROPONIN QUANT: CPT | Performed by: EMERGENCY MEDICINE

## 2020-02-21 PROCEDURE — 25010000002 MIDAZOLAM PER 1 MG: Performed by: ANESTHESIOLOGY

## 2020-02-21 PROCEDURE — 25010000002 FENTANYL CITRATE (PF) 100 MCG/2ML SOLUTION: Performed by: ANESTHESIOLOGY

## 2020-02-21 PROCEDURE — 25010000002 HYDRALAZINE PER 20 MG: Performed by: ANESTHESIOLOGY

## 2020-02-21 PROCEDURE — 93005 ELECTROCARDIOGRAM TRACING: CPT | Performed by: EMERGENCY MEDICINE

## 2020-02-21 PROCEDURE — 99284 EMERGENCY DEPT VISIT MOD MDM: CPT

## 2020-02-21 PROCEDURE — 25010000003 CEFAZOLIN IN DEXTROSE 2-4 GM/100ML-% SOLUTION: Performed by: SURGERY

## 2020-02-21 PROCEDURE — 83690 ASSAY OF LIPASE: CPT | Performed by: EMERGENCY MEDICINE

## 2020-02-21 PROCEDURE — 86850 RBC ANTIBODY SCREEN: CPT | Performed by: EMERGENCY MEDICINE

## 2020-02-21 PROCEDURE — 0 IOPAMIDOL PER 1 ML: Performed by: SURGERY

## 2020-02-21 PROCEDURE — 25010000002 SUCCINYLCHOLINE PER 20 MG: Performed by: ANESTHESIOLOGY

## 2020-02-21 PROCEDURE — 25010000002 DEXAMETHASONE PER 1 MG: Performed by: ANESTHESIOLOGY

## 2020-02-21 PROCEDURE — 25010000002 ALBUMIN HUMAN 5% PER 50 ML: Performed by: ANESTHESIOLOGY

## 2020-02-21 PROCEDURE — 80053 COMPREHEN METABOLIC PANEL: CPT | Performed by: EMERGENCY MEDICINE

## 2020-02-21 PROCEDURE — 86901 BLOOD TYPING SEROLOGIC RH(D): CPT | Performed by: EMERGENCY MEDICINE

## 2020-02-21 PROCEDURE — P9041 ALBUMIN (HUMAN),5%, 50ML: HCPCS | Performed by: ANESTHESIOLOGY

## 2020-02-21 PROCEDURE — C1760 CLOSURE DEV, VASC: HCPCS | Performed by: SURGERY

## 2020-02-21 PROCEDURE — 71045 X-RAY EXAM CHEST 1 VIEW: CPT

## 2020-02-21 PROCEDURE — 85610 PROTHROMBIN TIME: CPT | Performed by: EMERGENCY MEDICINE

## 2020-02-21 PROCEDURE — 25010000002 PROPOFOL 10 MG/ML EMULSION: Performed by: ANESTHESIOLOGY

## 2020-02-21 PROCEDURE — 85025 COMPLETE CBC W/AUTO DIFF WBC: CPT | Performed by: EMERGENCY MEDICINE

## 2020-02-21 PROCEDURE — 85347 COAGULATION TIME ACTIVATED: CPT

## 2020-02-21 PROCEDURE — 86900 BLOOD TYPING SEROLOGIC ABO: CPT | Performed by: EMERGENCY MEDICINE

## 2020-02-21 PROCEDURE — 25010000002 HEPARIN (PORCINE) PER 1000 UNITS: Performed by: ANESTHESIOLOGY

## 2020-02-21 PROCEDURE — 04V03DZ RESTRICTION OF ABDOMINAL AORTA WITH INTRALUMINAL DEVICE, PERCUTANEOUS APPROACH: ICD-10-PCS | Performed by: SURGERY

## 2020-02-21 PROCEDURE — 93010 ELECTROCARDIOGRAM REPORT: CPT | Performed by: INTERNAL MEDICINE

## 2020-02-21 DEVICE — GRFT EXCLDR C3 TRNK I/LAT 28X14.5MM 14CM: Type: IMPLANTABLE DEVICE | Site: ARTERY ILIAC | Status: FUNCTIONAL

## 2020-02-21 DEVICE — GRFT EXCLDR CONTRALAT 14.5MMX10CM: Type: IMPLANTABLE DEVICE | Site: ARTERY ILIAC | Status: FUNCTIONAL

## 2020-02-21 RX ORDER — OXYCODONE HYDROCHLORIDE 5 MG/1
5 TABLET ORAL EVERY 4 HOURS PRN
Status: DISCONTINUED | OUTPATIENT
Start: 2020-02-21 | End: 2020-02-23 | Stop reason: HOSPADM

## 2020-02-21 RX ORDER — ACETAMINOPHEN 325 MG/1
650 TABLET ORAL ONCE AS NEEDED
Status: DISCONTINUED | OUTPATIENT
Start: 2020-02-21 | End: 2020-02-22 | Stop reason: HOSPADM

## 2020-02-21 RX ORDER — OXYCODONE AND ACETAMINOPHEN 7.5; 325 MG/1; MG/1
1 TABLET ORAL ONCE AS NEEDED
Status: DISCONTINUED | OUTPATIENT
Start: 2020-02-21 | End: 2020-02-22 | Stop reason: HOSPADM

## 2020-02-21 RX ORDER — HYDROMORPHONE HYDROCHLORIDE 1 MG/ML
0.5 INJECTION, SOLUTION INTRAMUSCULAR; INTRAVENOUS; SUBCUTANEOUS
Status: DISCONTINUED | OUTPATIENT
Start: 2020-02-21 | End: 2020-02-22 | Stop reason: HOSPADM

## 2020-02-21 RX ORDER — PROMETHAZINE HYDROCHLORIDE 25 MG/ML
12.5 INJECTION, SOLUTION INTRAMUSCULAR; INTRAVENOUS ONCE AS NEEDED
Status: DISCONTINUED | OUTPATIENT
Start: 2020-02-21 | End: 2020-02-22 | Stop reason: HOSPADM

## 2020-02-21 RX ORDER — ROCURONIUM BROMIDE 10 MG/ML
INJECTION, SOLUTION INTRAVENOUS AS NEEDED
Status: DISCONTINUED | OUTPATIENT
Start: 2020-02-21 | End: 2020-02-21 | Stop reason: SURG

## 2020-02-21 RX ORDER — PROPOFOL 10 MG/ML
VIAL (ML) INTRAVENOUS AS NEEDED
Status: DISCONTINUED | OUTPATIENT
Start: 2020-02-21 | End: 2020-02-21 | Stop reason: SURG

## 2020-02-21 RX ORDER — PHENYLEPHRINE HYDROCHLORIDE 10 MG/ML
INJECTION INTRAVENOUS
Status: COMPLETED
Start: 2020-02-21 | End: 2020-02-21

## 2020-02-21 RX ORDER — LIDOCAINE HYDROCHLORIDE 20 MG/ML
INJECTION, SOLUTION INFILTRATION; PERINEURAL AS NEEDED
Status: DISCONTINUED | OUTPATIENT
Start: 2020-02-21 | End: 2020-02-21 | Stop reason: SURG

## 2020-02-21 RX ORDER — ACETAMINOPHEN 500 MG
1000 TABLET ORAL EVERY 6 HOURS SCHEDULED
Status: DISCONTINUED | OUTPATIENT
Start: 2020-02-22 | End: 2020-02-23 | Stop reason: HOSPADM

## 2020-02-21 RX ORDER — GLYCOPYRROLATE 0.2 MG/ML
INJECTION INTRAMUSCULAR; INTRAVENOUS AS NEEDED
Status: DISCONTINUED | OUTPATIENT
Start: 2020-02-21 | End: 2020-02-21 | Stop reason: SURG

## 2020-02-21 RX ORDER — ONDANSETRON 2 MG/ML
4 INJECTION INTRAMUSCULAR; INTRAVENOUS ONCE AS NEEDED
Status: DISCONTINUED | OUTPATIENT
Start: 2020-02-21 | End: 2020-02-22 | Stop reason: HOSPADM

## 2020-02-21 RX ORDER — SODIUM CHLORIDE 0.9 % (FLUSH) 0.9 %
3 SYRINGE (ML) INJECTION EVERY 12 HOURS SCHEDULED
Status: DISCONTINUED | OUTPATIENT
Start: 2020-02-21 | End: 2020-02-21 | Stop reason: HOSPADM

## 2020-02-21 RX ORDER — PROMETHAZINE HYDROCHLORIDE 25 MG/1
25 TABLET ORAL ONCE AS NEEDED
Status: DISCONTINUED | OUTPATIENT
Start: 2020-02-21 | End: 2020-02-22 | Stop reason: HOSPADM

## 2020-02-21 RX ORDER — EPHEDRINE SULFATE 50 MG/ML
5 INJECTION, SOLUTION INTRAVENOUS ONCE AS NEEDED
Status: DISCONTINUED | OUTPATIENT
Start: 2020-02-21 | End: 2020-02-22 | Stop reason: HOSPADM

## 2020-02-21 RX ORDER — DEXAMETHASONE SODIUM PHOSPHATE 10 MG/ML
INJECTION INTRAMUSCULAR; INTRAVENOUS AS NEEDED
Status: DISCONTINUED | OUTPATIENT
Start: 2020-02-21 | End: 2020-02-21 | Stop reason: SURG

## 2020-02-21 RX ORDER — MIDAZOLAM HYDROCHLORIDE 1 MG/ML
1 INJECTION INTRAMUSCULAR; INTRAVENOUS
Status: DISCONTINUED | OUTPATIENT
Start: 2020-02-21 | End: 2020-02-21 | Stop reason: HOSPADM

## 2020-02-21 RX ORDER — SUCCINYLCHOLINE CHLORIDE 20 MG/ML
INJECTION INTRAMUSCULAR; INTRAVENOUS AS NEEDED
Status: DISCONTINUED | OUTPATIENT
Start: 2020-02-21 | End: 2020-02-21 | Stop reason: SURG

## 2020-02-21 RX ORDER — PROMETHAZINE HYDROCHLORIDE 25 MG/1
25 SUPPOSITORY RECTAL ONCE AS NEEDED
Status: DISCONTINUED | OUTPATIENT
Start: 2020-02-21 | End: 2020-02-22 | Stop reason: HOSPADM

## 2020-02-21 RX ORDER — PROMETHAZINE HYDROCHLORIDE 25 MG/ML
6.25 INJECTION, SOLUTION INTRAMUSCULAR; INTRAVENOUS
Status: DISCONTINUED | OUTPATIENT
Start: 2020-02-21 | End: 2020-02-22 | Stop reason: HOSPADM

## 2020-02-21 RX ORDER — SODIUM CHLORIDE, SODIUM LACTATE, POTASSIUM CHLORIDE, CALCIUM CHLORIDE 600; 310; 30; 20 MG/100ML; MG/100ML; MG/100ML; MG/100ML
9 INJECTION, SOLUTION INTRAVENOUS CONTINUOUS
Status: DISCONTINUED | OUTPATIENT
Start: 2020-02-21 | End: 2020-02-23

## 2020-02-21 RX ORDER — ONDANSETRON 2 MG/ML
INJECTION INTRAMUSCULAR; INTRAVENOUS AS NEEDED
Status: DISCONTINUED | OUTPATIENT
Start: 2020-02-21 | End: 2020-02-21 | Stop reason: SURG

## 2020-02-21 RX ORDER — LABETALOL HYDROCHLORIDE 5 MG/ML
5 INJECTION, SOLUTION INTRAVENOUS
Status: DISCONTINUED | OUTPATIENT
Start: 2020-02-21 | End: 2020-02-22 | Stop reason: HOSPADM

## 2020-02-21 RX ORDER — NALOXONE HCL 0.4 MG/ML
0.2 VIAL (ML) INJECTION AS NEEDED
Status: DISCONTINUED | OUTPATIENT
Start: 2020-02-21 | End: 2020-02-22 | Stop reason: HOSPADM

## 2020-02-21 RX ORDER — FENTANYL CITRATE 50 UG/ML
50 INJECTION, SOLUTION INTRAMUSCULAR; INTRAVENOUS
Status: DISCONTINUED | OUTPATIENT
Start: 2020-02-21 | End: 2020-02-22 | Stop reason: HOSPADM

## 2020-02-21 RX ORDER — ALBUMIN, HUMAN INJ 5% 5 %
SOLUTION INTRAVENOUS
Status: COMPLETED
Start: 2020-02-21 | End: 2020-02-21

## 2020-02-21 RX ORDER — DIPHENHYDRAMINE HYDROCHLORIDE 50 MG/ML
12.5 INJECTION INTRAMUSCULAR; INTRAVENOUS
Status: DISCONTINUED | OUTPATIENT
Start: 2020-02-21 | End: 2020-02-22 | Stop reason: HOSPADM

## 2020-02-21 RX ORDER — FENTANYL CITRATE 50 UG/ML
50 INJECTION, SOLUTION INTRAMUSCULAR; INTRAVENOUS
Status: COMPLETED | OUTPATIENT
Start: 2020-02-21 | End: 2020-02-21

## 2020-02-21 RX ORDER — HEPARIN SODIUM 1000 [USP'U]/ML
INJECTION, SOLUTION INTRAVENOUS; SUBCUTANEOUS AS NEEDED
Status: DISCONTINUED | OUTPATIENT
Start: 2020-02-21 | End: 2020-02-21 | Stop reason: SURG

## 2020-02-21 RX ORDER — PROTAMINE SULFATE 10 MG/ML
INJECTION, SOLUTION INTRAVENOUS AS NEEDED
Status: DISCONTINUED | OUTPATIENT
Start: 2020-02-21 | End: 2020-02-21 | Stop reason: SURG

## 2020-02-21 RX ORDER — ALBUMIN, HUMAN INJ 5% 5 %
SOLUTION INTRAVENOUS CONTINUOUS PRN
Status: DISCONTINUED | OUTPATIENT
Start: 2020-02-21 | End: 2020-02-21 | Stop reason: SURG

## 2020-02-21 RX ORDER — LISINOPRIL 10 MG/1
20 TABLET ORAL DAILY
Status: DISCONTINUED | OUTPATIENT
Start: 2020-02-22 | End: 2020-02-23 | Stop reason: HOSPADM

## 2020-02-21 RX ORDER — HYDRALAZINE HYDROCHLORIDE 20 MG/ML
5 INJECTION INTRAMUSCULAR; INTRAVENOUS
Status: DISCONTINUED | OUTPATIENT
Start: 2020-02-21 | End: 2020-02-22 | Stop reason: HOSPADM

## 2020-02-21 RX ORDER — DIPHENHYDRAMINE HCL 25 MG
25 CAPSULE ORAL
Status: DISCONTINUED | OUTPATIENT
Start: 2020-02-21 | End: 2020-02-22 | Stop reason: HOSPADM

## 2020-02-21 RX ORDER — SODIUM CHLORIDE 0.9 % (FLUSH) 0.9 %
3-10 SYRINGE (ML) INJECTION AS NEEDED
Status: DISCONTINUED | OUTPATIENT
Start: 2020-02-21 | End: 2020-02-21 | Stop reason: HOSPADM

## 2020-02-21 RX ORDER — HYDRALAZINE HYDROCHLORIDE 20 MG/ML
INJECTION INTRAMUSCULAR; INTRAVENOUS AS NEEDED
Status: DISCONTINUED | OUTPATIENT
Start: 2020-02-21 | End: 2020-02-21 | Stop reason: SURG

## 2020-02-21 RX ORDER — FLUMAZENIL 0.1 MG/ML
0.2 INJECTION INTRAVENOUS AS NEEDED
Status: DISCONTINUED | OUTPATIENT
Start: 2020-02-21 | End: 2020-02-22 | Stop reason: HOSPADM

## 2020-02-21 RX ORDER — HYDROMORPHONE HYDROCHLORIDE 1 MG/ML
0.25 INJECTION, SOLUTION INTRAMUSCULAR; INTRAVENOUS; SUBCUTANEOUS
Status: DISCONTINUED | OUTPATIENT
Start: 2020-02-21 | End: 2020-02-23

## 2020-02-21 RX ORDER — FAMOTIDINE 10 MG/ML
20 INJECTION, SOLUTION INTRAVENOUS ONCE
Status: COMPLETED | OUTPATIENT
Start: 2020-02-21 | End: 2020-02-21

## 2020-02-21 RX ORDER — MIDAZOLAM HYDROCHLORIDE 1 MG/ML
2 INJECTION INTRAMUSCULAR; INTRAVENOUS
Status: DISCONTINUED | OUTPATIENT
Start: 2020-02-21 | End: 2020-02-21 | Stop reason: HOSPADM

## 2020-02-21 RX ORDER — NITROGLYCERIN 20 MG/100ML
INJECTION INTRAVENOUS
Status: DISPENSED
Start: 2020-02-21 | End: 2020-02-22

## 2020-02-21 RX ORDER — HYDROCODONE BITARTRATE AND ACETAMINOPHEN 7.5; 325 MG/1; MG/1
1 TABLET ORAL ONCE AS NEEDED
Status: DISCONTINUED | OUTPATIENT
Start: 2020-02-21 | End: 2020-02-22 | Stop reason: HOSPADM

## 2020-02-21 RX ORDER — CEFAZOLIN SODIUM 2 G/100ML
2 INJECTION, SOLUTION INTRAVENOUS ONCE
Status: COMPLETED | OUTPATIENT
Start: 2020-02-21 | End: 2020-02-21

## 2020-02-21 RX ORDER — SODIUM CHLORIDE, SODIUM LACTATE, POTASSIUM CHLORIDE, CALCIUM CHLORIDE 600; 310; 30; 20 MG/100ML; MG/100ML; MG/100ML; MG/100ML
100 INJECTION, SOLUTION INTRAVENOUS CONTINUOUS
Status: DISCONTINUED | OUTPATIENT
Start: 2020-02-21 | End: 2020-02-23

## 2020-02-21 RX ORDER — LIDOCAINE HYDROCHLORIDE 10 MG/ML
0.5 INJECTION, SOLUTION EPIDURAL; INFILTRATION; INTRACAUDAL; PERINEURAL ONCE AS NEEDED
Status: DISCONTINUED | OUTPATIENT
Start: 2020-02-21 | End: 2020-02-21 | Stop reason: HOSPADM

## 2020-02-21 RX ORDER — SODIUM CHLORIDE 9 MG/ML
125 INJECTION, SOLUTION INTRAVENOUS CONTINUOUS
Status: DISCONTINUED | OUTPATIENT
Start: 2020-02-21 | End: 2020-02-23

## 2020-02-21 RX ADMIN — PHENYLEPHRINE HYDROCHLORIDE 100 MCG: 10 INJECTION INTRAVENOUS at 21:56

## 2020-02-21 RX ADMIN — FENTANYL CITRATE 50 MCG: 50 INJECTION INTRAMUSCULAR; INTRAVENOUS at 22:58

## 2020-02-21 RX ADMIN — PHENYLEPHRINE HYDROCHLORIDE 100 MCG: 10 INJECTION INTRAVENOUS at 22:05

## 2020-02-21 RX ADMIN — PHENYLEPHRINE HYDROCHLORIDE 100 MCG: 10 INJECTION INTRAVENOUS at 22:22

## 2020-02-21 RX ADMIN — NEOSTIGMINE METHYLSULFATE 4 MG: 1 INJECTION INTRAMUSCULAR; INTRAVENOUS; SUBCUTANEOUS at 22:55

## 2020-02-21 RX ADMIN — PROPOFOL 20 MG: 10 INJECTION, EMULSION INTRAVENOUS at 21:38

## 2020-02-21 RX ADMIN — PHENYLEPHRINE HYDROCHLORIDE 100 MCG: 10 INJECTION INTRAVENOUS at 21:27

## 2020-02-21 RX ADMIN — FENTANYL CITRATE 25 MCG: 50 INJECTION INTRAMUSCULAR; INTRAVENOUS at 22:35

## 2020-02-21 RX ADMIN — FAMOTIDINE 20 MG: 10 INJECTION INTRAVENOUS at 20:14

## 2020-02-21 RX ADMIN — PHENYLEPHRINE HYDROCHLORIDE 100 MCG: 10 INJECTION INTRAVENOUS at 21:42

## 2020-02-21 RX ADMIN — SUGAMMADEX 200 MG: 100 INJECTION, SOLUTION INTRAVENOUS at 23:13

## 2020-02-21 RX ADMIN — PHENYLEPHRINE HYDROCHLORIDE 200 MCG: 10 INJECTION INTRAVENOUS at 22:42

## 2020-02-21 RX ADMIN — ROCURONIUM BROMIDE 40 MG: 10 INJECTION, SOLUTION INTRAVENOUS at 21:04

## 2020-02-21 RX ADMIN — PHENYLEPHRINE HYDROCHLORIDE 100 MCG: 10 INJECTION INTRAVENOUS at 20:57

## 2020-02-21 RX ADMIN — PROPOFOL 40 MG: 10 INJECTION, EMULSION INTRAVENOUS at 21:04

## 2020-02-21 RX ADMIN — PHENYLEPHRINE HYDROCHLORIDE 100 MCG: 10 INJECTION INTRAVENOUS at 21:25

## 2020-02-21 RX ADMIN — PHENYLEPHRINE HYDROCHLORIDE 100 MCG: 10 INJECTION INTRAVENOUS at 22:14

## 2020-02-21 RX ADMIN — PHENYLEPHRINE HYDROCHLORIDE 100 MCG: 10 INJECTION INTRAVENOUS at 22:09

## 2020-02-21 RX ADMIN — PROPOFOL 50 MG: 10 INJECTION, EMULSION INTRAVENOUS at 22:31

## 2020-02-21 RX ADMIN — PHENYLEPHRINE HYDROCHLORIDE 100 MCG: 10 INJECTION INTRAVENOUS at 22:11

## 2020-02-21 RX ADMIN — SODIUM CHLORIDE: 9 INJECTION, SOLUTION INTRAVENOUS at 21:15

## 2020-02-21 RX ADMIN — GLYCOPYRROLATE 0.4 MG: 0.2 INJECTION INTRAMUSCULAR; INTRAVENOUS at 22:55

## 2020-02-21 RX ADMIN — LIDOCAINE HYDROCHLORIDE 80 MG: 20 INJECTION, SOLUTION INFILTRATION; PERINEURAL at 20:47

## 2020-02-21 RX ADMIN — PHENYLEPHRINE HYDROCHLORIDE 100 MCG: 10 INJECTION INTRAVENOUS at 22:18

## 2020-02-21 RX ADMIN — PHENYLEPHRINE HYDROCHLORIDE 100 MCG: 10 INJECTION INTRAVENOUS at 21:59

## 2020-02-21 RX ADMIN — PHENYLEPHRINE HYDROCHLORIDE 100 MCG: 10 INJECTION INTRAVENOUS at 22:19

## 2020-02-21 RX ADMIN — PHENYLEPHRINE HYDROCHLORIDE 100 MCG: 10 INJECTION INTRAVENOUS at 21:28

## 2020-02-21 RX ADMIN — HEPARIN SODIUM 10000 UNITS: 1000 INJECTION, SOLUTION INTRAVENOUS; SUBCUTANEOUS at 21:37

## 2020-02-21 RX ADMIN — PHENYLEPHRINE HYDROCHLORIDE 100 MCG: 10 INJECTION INTRAVENOUS at 22:45

## 2020-02-21 RX ADMIN — PHENYLEPHRINE HYDROCHLORIDE 100 MCG: 10 INJECTION INTRAVENOUS at 21:32

## 2020-02-21 RX ADMIN — ONDANSETRON HYDROCHLORIDE 4 MG: 2 SOLUTION INTRAMUSCULAR; INTRAVENOUS at 22:53

## 2020-02-21 RX ADMIN — PHENYLEPHRINE HYDROCHLORIDE 100 MCG: 10 INJECTION INTRAVENOUS at 22:25

## 2020-02-21 RX ADMIN — PHENYLEPHRINE HYDROCHLORIDE 100 MCG: 10 INJECTION INTRAVENOUS at 21:21

## 2020-02-21 RX ADMIN — PHENYLEPHRINE HYDROCHLORIDE 100 MCG: 10 INJECTION INTRAVENOUS at 21:18

## 2020-02-21 RX ADMIN — ALBUMIN HUMAN: 0.05 INJECTION, SOLUTION INTRAVENOUS at 22:00

## 2020-02-21 RX ADMIN — IOPAMIDOL 132 ML: 510 INJECTION, SOLUTION INTRAVASCULAR at 21:20

## 2020-02-21 RX ADMIN — SODIUM CHLORIDE, POTASSIUM CHLORIDE, SODIUM LACTATE AND CALCIUM CHLORIDE: 600; 310; 30; 20 INJECTION, SOLUTION INTRAVENOUS at 22:34

## 2020-02-21 RX ADMIN — MIDAZOLAM 1 MG: 1 INJECTION INTRAMUSCULAR; INTRAVENOUS at 20:29

## 2020-02-21 RX ADMIN — FENTANYL CITRATE 25 MCG: 50 INJECTION INTRAMUSCULAR; INTRAVENOUS at 21:04

## 2020-02-21 RX ADMIN — DEXAMETHASONE SODIUM PHOSPHATE 10 MG: 10 INJECTION INTRAMUSCULAR; INTRAVENOUS at 21:49

## 2020-02-21 RX ADMIN — PROTAMINE SULFATE 50 MG: 10 INJECTION, SOLUTION INTRAVENOUS at 22:43

## 2020-02-21 RX ADMIN — PHENYLEPHRINE HYDROCHLORIDE 100 MCG: 10 INJECTION INTRAVENOUS at 21:10

## 2020-02-21 RX ADMIN — PHENYLEPHRINE HYDROCHLORIDE 100 MCG: 10 INJECTION INTRAVENOUS at 21:53

## 2020-02-21 RX ADMIN — PROPOFOL 200 MG: 10 INJECTION, EMULSION INTRAVENOUS at 20:47

## 2020-02-21 RX ADMIN — PHENYLEPHRINE HYDROCHLORIDE 100 MCG: 10 INJECTION INTRAVENOUS at 21:45

## 2020-02-21 RX ADMIN — SUCCINYLCHOLINE CHLORIDE 140 MG: 20 INJECTION, SOLUTION INTRAMUSCULAR; INTRAVENOUS; PARENTERAL at 20:47

## 2020-02-21 RX ADMIN — SODIUM CHLORIDE 125 ML/HR: 9 INJECTION, SOLUTION INTRAVENOUS at 19:15

## 2020-02-21 RX ADMIN — HEPARIN SODIUM 5000 UNITS: 1000 INJECTION, SOLUTION INTRAVENOUS; SUBCUTANEOUS at 21:50

## 2020-02-21 RX ADMIN — PHENYLEPHRINE HYDROCHLORIDE 200 MCG: 10 INJECTION INTRAVENOUS at 21:15

## 2020-02-21 RX ADMIN — CEFAZOLIN SODIUM 2 G: 2 INJECTION, SOLUTION INTRAVENOUS at 20:54

## 2020-02-21 RX ADMIN — PHENYLEPHRINE HYDROCHLORIDE 100 MCG: 10 INJECTION INTRAVENOUS at 22:02

## 2020-02-21 RX ADMIN — SODIUM CHLORIDE, POTASSIUM CHLORIDE, SODIUM LACTATE AND CALCIUM CHLORIDE: 600; 310; 30; 20 INJECTION, SOLUTION INTRAVENOUS at 20:42

## 2020-02-21 RX ADMIN — PHENYLEPHRINE HYDROCHLORIDE 100 MCG: 10 INJECTION INTRAVENOUS at 22:36

## 2020-02-21 RX ADMIN — ROCURONIUM BROMIDE 20 MG: 10 INJECTION, SOLUTION INTRAVENOUS at 22:29

## 2020-02-21 RX ADMIN — PROPOFOL 50 MG: 10 INJECTION, EMULSION INTRAVENOUS at 22:29

## 2020-02-21 RX ADMIN — PHENYLEPHRINE HYDROCHLORIDE 100 MCG: 10 INJECTION INTRAVENOUS at 21:19

## 2020-02-21 RX ADMIN — FENTANYL CITRATE 50 MCG: 50 INJECTION INTRAMUSCULAR; INTRAVENOUS at 20:11

## 2020-02-21 RX ADMIN — HYDRALAZINE HYDROCHLORIDE 20 MG: 20 INJECTION INTRAMUSCULAR; INTRAVENOUS at 22:57

## 2020-02-21 RX ADMIN — PHENYLEPHRINE HYDROCHLORIDE 100 MCG: 10 INJECTION INTRAVENOUS at 22:28

## 2020-02-21 RX ADMIN — ALBUMIN HUMAN: 0.05 INJECTION, SOLUTION INTRAVENOUS at 21:47

## 2020-02-21 RX ADMIN — SODIUM CHLORIDE 500 ML: 9 INJECTION, SOLUTION INTRAVENOUS at 19:15

## 2020-02-21 RX ADMIN — PHENYLEPHRINE HYDROCHLORIDE 100 MCG: 10 INJECTION INTRAVENOUS at 22:04

## 2020-02-21 RX ADMIN — PHENYLEPHRINE HYDROCHLORIDE 100 MCG: 10 INJECTION INTRAVENOUS at 21:23

## 2020-02-22 LAB
ANION GAP SERPL CALCULATED.3IONS-SCNC: 13.2 MMOL/L (ref 5–15)
BACTERIA UR QL AUTO: ABNORMAL /HPF
BASOPHILS # BLD AUTO: 0.01 10*3/MM3 (ref 0–0.2)
BASOPHILS NFR BLD AUTO: 0.1 % (ref 0–1.5)
BILIRUB UR QL STRIP: NEGATIVE
BUN BLD-MCNC: 20 MG/DL (ref 8–23)
BUN/CREAT SERPL: 18.5 (ref 7–25)
CALCIUM SPEC-SCNC: 8.6 MG/DL (ref 8.6–10.5)
CHLORIDE SERPL-SCNC: 106 MMOL/L (ref 98–107)
CLARITY UR: CLEAR
CO2 SERPL-SCNC: 18.8 MMOL/L (ref 22–29)
COLOR UR: YELLOW
CREAT BLD-MCNC: 1.08 MG/DL (ref 0.76–1.27)
DEPRECATED RDW RBC AUTO: 46.2 FL (ref 37–54)
EOSINOPHIL # BLD AUTO: 0.01 10*3/MM3 (ref 0–0.4)
EOSINOPHIL NFR BLD AUTO: 0.1 % (ref 0.3–6.2)
ERYTHROCYTE [DISTWIDTH] IN BLOOD BY AUTOMATED COUNT: 13.1 % (ref 12.3–15.4)
GFR SERPL CREATININE-BSD FRML MDRD: 68 ML/MIN/1.73
GLUCOSE BLD-MCNC: 129 MG/DL (ref 65–99)
GLUCOSE BLDC GLUCOMTR-MCNC: 122 MG/DL (ref 70–130)
GLUCOSE BLDC GLUCOMTR-MCNC: 153 MG/DL (ref 70–130)
GLUCOSE UR STRIP-MCNC: NEGATIVE MG/DL
HCT VFR BLD AUTO: 34.9 % (ref 37.5–51)
HGB BLD-MCNC: 11.9 G/DL (ref 13–17.7)
HGB UR QL STRIP.AUTO: ABNORMAL
HYALINE CASTS UR QL AUTO: ABNORMAL /LPF
IMM GRANULOCYTES # BLD AUTO: 0.05 10*3/MM3 (ref 0–0.05)
IMM GRANULOCYTES NFR BLD AUTO: 0.6 % (ref 0–0.5)
KETONES UR QL STRIP: NEGATIVE
LEUKOCYTE ESTERASE UR QL STRIP.AUTO: NEGATIVE
LYMPHOCYTES # BLD AUTO: 0.75 10*3/MM3 (ref 0.7–3.1)
LYMPHOCYTES NFR BLD AUTO: 8.5 % (ref 19.6–45.3)
MCH RBC QN AUTO: 32.8 PG (ref 26.6–33)
MCHC RBC AUTO-ENTMCNC: 34.1 G/DL (ref 31.5–35.7)
MCV RBC AUTO: 96.1 FL (ref 79–97)
MONOCYTES # BLD AUTO: 0.17 10*3/MM3 (ref 0.1–0.9)
MONOCYTES NFR BLD AUTO: 1.9 % (ref 5–12)
NEUTROPHILS # BLD AUTO: 7.79 10*3/MM3 (ref 1.7–7)
NEUTROPHILS NFR BLD AUTO: 88.8 % (ref 42.7–76)
NITRITE UR QL STRIP: NEGATIVE
NRBC BLD AUTO-RTO: 0 /100 WBC (ref 0–0.2)
PH UR STRIP.AUTO: <=5 [PH] (ref 5–8)
PLATELET # BLD AUTO: 158 10*3/MM3 (ref 140–450)
PMV BLD AUTO: 10.3 FL (ref 6–12)
POTASSIUM BLD-SCNC: 4.4 MMOL/L (ref 3.5–5.2)
PROT UR QL STRIP: NEGATIVE
RBC # BLD AUTO: 3.63 10*6/MM3 (ref 4.14–5.8)
RBC # UR: ABNORMAL /HPF
REF LAB TEST METHOD: ABNORMAL
SODIUM BLD-SCNC: 138 MMOL/L (ref 136–145)
SP GR UR STRIP: 1.03 (ref 1–1.03)
SQUAMOUS #/AREA URNS HPF: ABNORMAL /HPF
UROBILINOGEN UR QL STRIP: ABNORMAL
WBC NRBC COR # BLD: 8.78 10*3/MM3 (ref 3.4–10.8)
WBC UR QL AUTO: ABNORMAL /HPF

## 2020-02-22 PROCEDURE — 25010000002 HYDROMORPHONE PER 4 MG: Performed by: SURGERY

## 2020-02-22 PROCEDURE — 80048 BASIC METABOLIC PNL TOTAL CA: CPT | Performed by: SURGERY

## 2020-02-22 PROCEDURE — 85025 COMPLETE CBC W/AUTO DIFF WBC: CPT | Performed by: SURGERY

## 2020-02-22 PROCEDURE — 81001 URINALYSIS AUTO W/SCOPE: CPT | Performed by: SURGERY

## 2020-02-22 PROCEDURE — 25010000002 ENOXAPARIN PER 10 MG: Performed by: SURGERY

## 2020-02-22 PROCEDURE — 82962 GLUCOSE BLOOD TEST: CPT

## 2020-02-22 PROCEDURE — 94799 UNLISTED PULMONARY SVC/PX: CPT

## 2020-02-22 RX ORDER — ASPIRIN 81 MG/1
81 TABLET, CHEWABLE ORAL DAILY
Status: DISCONTINUED | OUTPATIENT
Start: 2020-02-22 | End: 2020-02-23 | Stop reason: HOSPADM

## 2020-02-22 RX ORDER — ACETAMINOPHEN 325 MG/1
650 TABLET ORAL EVERY 6 HOURS PRN
COMMUNITY
End: 2020-08-03

## 2020-02-22 RX ORDER — ATORVASTATIN CALCIUM 20 MG/1
20 TABLET, FILM COATED ORAL EVERY EVENING
Status: DISCONTINUED | OUTPATIENT
Start: 2020-02-22 | End: 2020-02-23 | Stop reason: HOSPADM

## 2020-02-22 RX ORDER — ASPIRIN 325 MG
325 TABLET ORAL AS NEEDED
COMMUNITY
End: 2020-07-14 | Stop reason: HOSPADM

## 2020-02-22 RX ORDER — NITROGLYCERIN 0.4 MG/1
0.4 TABLET SUBLINGUAL
Status: DISCONTINUED | OUTPATIENT
Start: 2020-02-22 | End: 2020-02-23 | Stop reason: HOSPADM

## 2020-02-22 RX ADMIN — ACETAMINOPHEN 1000 MG: 500 TABLET, FILM COATED ORAL at 18:31

## 2020-02-22 RX ADMIN — ACETAMINOPHEN 1000 MG: 500 TABLET, FILM COATED ORAL at 06:14

## 2020-02-22 RX ADMIN — ENOXAPARIN SODIUM 40 MG: 40 INJECTION SUBCUTANEOUS at 08:51

## 2020-02-22 RX ADMIN — LISINOPRIL 20 MG: 10 TABLET ORAL at 08:51

## 2020-02-22 RX ADMIN — SODIUM CHLORIDE, POTASSIUM CHLORIDE, SODIUM LACTATE AND CALCIUM CHLORIDE 100 ML/HR: 600; 310; 30; 20 INJECTION, SOLUTION INTRAVENOUS at 18:26

## 2020-02-22 RX ADMIN — ACETAMINOPHEN 1000 MG: 500 TABLET, FILM COATED ORAL at 13:27

## 2020-02-22 RX ADMIN — HYDROMORPHONE HYDROCHLORIDE 0.25 MG: 1 INJECTION, SOLUTION INTRAMUSCULAR; INTRAVENOUS; SUBCUTANEOUS at 02:02

## 2020-02-22 RX ADMIN — ATORVASTATIN CALCIUM 20 MG: 20 TABLET, FILM COATED ORAL at 18:31

## 2020-02-22 RX ADMIN — ASPIRIN 81 MG: 81 TABLET, CHEWABLE ORAL at 08:52

## 2020-02-22 RX ADMIN — SODIUM CHLORIDE, POTASSIUM CHLORIDE, SODIUM LACTATE AND CALCIUM CHLORIDE 100 ML/HR: 600; 310; 30; 20 INJECTION, SOLUTION INTRAVENOUS at 00:05

## 2020-02-22 NOTE — OP NOTE
Operative Note  Location: [facility]  Date of Admission:  2/21/2020  OR Date: 2/21/2020    Darryn Baxter  2/21/2020    Pre-op Diagnosis: AAA (abdominal aortic aneurysm) [I71.4]  Post-op Diagnosis: Same    Procedure: EVAR    CPT Codes:  00008 Rupture Aorto-biIliac endograft   +27000 Percutaneous access (12 fr sheath or larger)   +07902 Percutaneous access (12 fr sheath or larger)     Surgeon: Kev Pérez MD    Assistant: Ayaz Castro MD , Provided critical assistance in exposure, retraction, and suction that overall decrease blood loss and operative time.    Anesthesia: * No anesthesia type entered *    Estimated Blood Loss: 200ml    Complications: None    Specimen: None    Findings: Patient did have some slow filling of the aneurysm sac on the left lateral side that I felt was a type II endoleak.    Implants:   Implant Name Type Inv. Item Serial No.  Lot No. LRB No. Used   GRFT EXCLDR C3 TRNK I/LAT [id number] 14CM - [id number] - [id number] Implant GRFT EXCLDR C3 TRNK I/LAT 28X14.5MM 14CM [id number] [employer] . Right 1   GRFT EXCLDR CONTRALAT [id number] - [id number] - [id number] Implant GRFT EXCLDR CONTRALAT [id number] [employer] . Left 1       Indications:    The patient is an 64 y.o. male referred for evaluation emergently in the emergency department this evening because of abdominal pain, a 4 cm aneurysm but with evidence of arterial contrast anteriorly concerning for rupture.  The patient's blood pressure was in the 90s to 100s in the emergency room       Procedure:  The risks, benefits, complications, treatment options, and expected outcomes were discussed with the patient. The patient concurred with the proposed plan, giving informed consent. The site of surgery properly noted/marked and patient identified as Darryn Baxter and the procedure verified as AAA repair. A Time Out was held and the above information confirmed.    Standard monitoring lines and Lechuga catheter were  placed. General Anesthesia  was induced.  Upon induction, his pressure was in the 70s and he required some support with Gregg-Synephrine.  The patient had a Lechuga catheter placed. The patient was prepped and draped in a sterile fashion. The abdomen and both legs were prepped and draped in the usual sterile fashion. The right and left common femoral arteries were cannulated via the Seldinger technique. Guidewires were inserted into the abdominal aorta using fluoroscopy. On the right side using the Proglide closure device we pre-deployed two devices placing sutures into the artery. On the left side, similarly we used a Preclose technique with two proglides.  We did end up placing a third Proglide on the left when the second one failed.  Small transverse bilateral groin incisions were created and the common femoral arteries were dissected bluntly and exposed anteriorly.The patient was fully anticoagulated with heparin. Bilateral aortic catheters were passed. Then a 18 [place] sheath was inserted into the ipsilateral right common femoral artery and advanced into the aorta under fluoroscopic guidance. The main body endoprosthesis, measuring 28 mm in aortic diameter x 14.5 mm in limb diameter x 14 cm in length was inserted into the sheath and positioned in the abdominal aorta. A catheter was positioned in the suprarenal position from the contralateral femoral approach. An aortogram was obtained which revealed position of the renal arteries. The graft was deployed just distal to the renal arteries.    The aortic catheter was then retracted and used to access the contralateral limb docking port. Once this was achieved, a stiff wire was passed through the docking port. A 12 [place]er sheath was inserted into the contralateral femoral artery and the contralateral limb measuring 14 mm in limb diameter x 10 cm in length was positioned into the docking port. The graft markers were aligned and then the limb was deployed to land above  the internal iliac artery.       Compliant balloon angioplasty was done to achieve proximal aortic seal, this was also done to all overlapping segments, and the distal seal zones.    A completion arteriogram was obtained.  There was no initial evidence of a type I endoleak early in the run but in the middle of the run you could see some contrast filling the aneurysm sac on the left lateral wall.  We pulled down into the sac and reimage this and I felt like this was a type II endoleak.  We had a very aggressive proximal landing zone particularly on the right renal.  He had a reasonable neck and I did not feel like this was a type I so we elected to remove sheath and begin to close.  There was good position of the stent graft. Next, the sheath was removed from the right groin and then the pre-deployed sutures of the Proglide devices were tied. This showed excellent hemostasis. The wire was removed. Next on the left side, the sheath was removed. The Proglide sutures were then secured and it showed excellent hemostasis. A 4-0 Vicryl was used to close the surgical sites and dermal glue was used for the skin closure. At the conclusion of the procedure there was good Doppler flow to each foot. Sterile dressings applied.

## 2020-02-22 NOTE — CONSULTS
Referring Provider: Dr. Pérez  Reason for Consultation: Critical care management    Patient Care Team:  Darryn Nielsen MD as PCP - General (Family Medicine)  Darryn Nielsen MD as PCP - Claims Attributed    Chief complaint:   Abdominal pain    History of present illness:  Subjective   This is a 64-year-old male patient, blind, mild smoker (2-3 cigarettes a day).  Has history of diverticulosis and had surgery by Dr. Oneil Lowry.    He reported abdominal pain which started last Monday around 10 PM when he woke up from his nap.  He stated that his night person and usually take a late nap at night and wake up throughout the night.  Pain was severe in intensity, located in the mid abdomen with no radiation, with no worsening or alleviating factors.  He had associated mild nausea but no vomiting, sweating and liquidy stool.  The pain was severe enough to the point that he slept all day Tuesday and all day Wednesday.  On Thursday, he felt better but then Friday night he had severe pain again.  Apparently had a CT scan of the abdomen and at outside facility which showed 4 cm aortic aneurysm with suspected leak.  He was transferred immediately to our hospital for further intervention.    He underwent arteriogram by vascular surgery and endovascular repair of the aortic leak.  Her returned to the intensive care unit postoperatively.  On my evaluation, he has just received Dilaudid and stated his pain has resolved.    Labs reviewed:  Creatinine 1.2; WBC 10; hemoglobin 15; platelets 195    Review of Systems  Constitutional: No fever or chills.   ENMT: No sinus congestion or postnasal drip.  Snoring  Cardiovascular: No chest pain, palpitation or legs swelling.    Respiratory: No dyspnea, cough or wheezing.  Gastrointestinal: No constipation, diarrhea or abdominal pain   Neurology: No headache, weakness, numbness or dizziness.   Musculoskeletal: No joints pain, stiffness or swelling.   Psychiatry: No  depression.  Genitourinary: No dysuria or frequent urination  Endo: No weight changes. No cold or warm intolerance.  Lymphatic: No swollen glands.  Integumentary: No rash.    History  Past Medical History:   Diagnosis Date   • Abscess     COLONIC   • Arthritis    • Floaters     VISION BETTER IN RIGHT VS LEFT   • Gastric reflux     ON OCCASION   • History of cellulitis     LEFT ANKLE ABOUT 10 YRS AGO   • History of migraine     VERY LIGHT SENSITIVE   • Hypertension    • Lumbar herniated disc    • RLF (retrolental fibroplasia)     SINCE CHILDHOOD   • Vision impairment    ,   Past Surgical History:   Procedure Laterality Date   • APPENDECTOMY     • ARTHROSCOPY SHOULDER / OPEN SHOULDER Right     ROTATOR CUFF   • BACK SURGERY      LUMBAR   • CATARACT EXTRACTION      LEFT AND RIGHT   • CHOLECYSTECTOMY     • COLON RESECTION N/A 3/29/2018    Procedure: HAND ASSISTED LAPAROSCOPIC LOW ANTERIOR COLON RESECTION;  Surgeon: Oneil Hallman MD;  Location: [facility] OR;  Service: General   • COLONOSCOPY  2015   • FRACTURE SURGERY Left     ELBOW   • TONSILLECTOMY     • UMBILICAL HERNIA REPAIR N/A 10/5/2016    Procedure: UMBILICAL HERNIA REPAIR with mesh;  Surgeon: Oneil Hallman MD;  Location: [facility];  Service:    ,   Family History   Problem Relation Age of Onset   • Diabetes Mother    • Leukemia Father    • Drug abuse Maternal Uncle    • Stroke Maternal Uncle    • Drug abuse Paternal Uncle    • Stroke Paternal Uncle    • Malig Hyperthermia Neg Hx    ,   Social History     Tobacco Use   • Smoking status: Current Every Day Smoker     Packs/day: 0.25     Years: 15.00     Pack years: 3.75     Types: Cigarettes   • Smokeless tobacco: Never Used   • Tobacco comment: 5 cigarettes   Substance Use Topics   • Alcohol use: No     Comment: FORMER   16 YRS SOBER   • Drug use: No   ,   Medications Prior to Admission   Medication Sig Dispense Refill Last Dose   • acetaminophen (TYLENOL) 325 MG tablet Take 650 mg by mouth Every 6 (Six)  Hours As Needed for Mild Pain  (AS NEEDED).   Past Month at Unknown time   • albuterol sulfate  (90 Base) MCG/ACT inhaler Inhale 2 puffs Every 4 (Four) Hours As Needed for Wheezing. 1 inhaler 0 Past Month at Unknown time   • aspirin 325 MG tablet Take 325 mg by mouth As Needed for Mild Pain .   Past Week at Unknown time   • calcium polycarbophil (FIBERCON) 625 MG tablet Take 625 mg by mouth Daily.   2/20/2020 at Unknown time   • desoximetasone (TOPICORT) 0.25 % cream Apply  topically to the appropriate area as directed 2 (Two) Times a Day. 1 each 3 Past Month at Unknown time   • ibuprofen (ADVIL,MOTRIN) 200 MG tablet Take 800 mg by mouth Every 6 (Six) Hours As Needed for Mild Pain . HELD FOR SURGERY   Past Month at Unknown time   • lisinopril (PRINIVIL,ZESTRIL) 20 MG tablet Take 1 tablet by mouth Daily. 90 tablet 1 2/21/2020 at 2230   • vitamin C (ASCORBIC ACID) 500 MG tablet Take 1,000 mg by mouth Daily.   2/17/2020   • predniSONE (DELTASONE) 20 MG tablet Take 1 tablet by mouth 2 (Two) Times a Day. 10 tablet 0    , Scheduled Meds:    acetaminophen 1,000 mg Oral Q6H   aspirin 81 mg Oral Daily   atorvastatin 20 mg Oral Q PM   enoxaparin 40 mg Subcutaneous Daily   lisinopril 20 mg Oral Daily   nitroglycerin       and Allergies:  Ether and Latex    Objective     Vital Signs   Temp:  [96.3 °F (35.7 °C)-99.3 °F (37.4 °C)] 98 °F (36.7 °C)  Heart Rate:  [] 86  Resp:  [16-30] 20  BP: (104-155)/() 104/65  Arterial Line BP: (107-233)/() 127/58    Physical Exam:  Constitutional: Not in acute distress.  Eyes: Injected conjunctivae, EOMI. pupils equal reactive to light.  ENMT: Han 3. No oral thrush.   Neck: Large. Trachea midline. No thyromegaly  Heart: RRR, no murmur  Lungs: Good and equal air entry bilaterally.  Non labored breathing.   Abdomen: Obese. Soft. No tenderness or dullness. No HSM.  Extremities: No cyanosis, clubbing or pitting edema.  Warm extremities and well-perfused.  Neuro:  Conscious, alert, oriented x3.  Strength 5/5 in arms.  Psych: Appropriate mood and affect.    Integumentary: No rash.  Normal skin turgor  Lymphatic: No palpable cervical or supraclavicular lymph nodes.      Diagnostic imaging:  I personally and independently reviewed the following images:   CXR 2/21/2020: Clear lung fields        Assessment     1. Abdominal aortic aneurysm with endoleak s/p percutaneous repair  2. Essential hypertension  3. Snoring  4. Obesity (BMI 36)  5. Dyslipidemia      Recommendations:    · Admit to ICU for monitoring.  · PRN antihypertensive.  · Pain management with Dilaudid as needed per vascular surgery.  · DVT prophylaxis with Lovenox  · Counseled for smoking cessation          Mary Ann Vargas MD  02/22/20  2:57 AM

## 2020-02-22 NOTE — ANESTHESIA PROCEDURE NOTES
Arterial Line      Patient location during procedure: holding area  Start time: 2/21/2020 8:10 PM  Stop Time:2/21/2020 8:15 PM       Line placed for hemodynamic monitoring.  Performed By   Anesthesiologist: Mariola Mar MD  Preanesthetic Checklist  Completed: patient identified, site marked, pre-op evaluation, IV checked, risks and benefits discussed and monitors and equipment checked  Arterial Line Prep   Prep: ChloraPrep  Arterial Line Procedure   Laterality:right  Location:  radial artery  Catheter size: 20 G   Guidance: ultrasound guided  PROCEDURE NOTE/ULTRASOUND INTERPRETATION.  Using ultrasound guidance the potential vascular sites for insertion of the catheter were visualized to determine the patency of the vessel to be used for vascular access.  After selecting the appropriate site for insertion, the needle was visualized under ultrasound being inserted into the radial artery, followed by ultrasound confirmation of wire and catheter placement. There were no abnormalities seen on ultrasound; an image was taken; and the patient tolerated the procedure with no complications.   Number of attempts: 1  Successful placement: yes  Post Assessment   Dressing Type: occlusive dressing applied.   Complications no  Circ/Move/Sens Assessment: normal.   Patient Tolerance: patient tolerated the procedure well with no apparent complications

## 2020-02-22 NOTE — PLAN OF CARE
Problem: Patient Care Overview  Goal: Plan of Care Review  Outcome: Ongoing (interventions implemented as appropriate)  Flowsheets (Taken 2/22/2020 0330)  Progress: improving  Plan of Care Reviewed With: patient  Note:   Pt admitted with AAA. Repair w/ Dr. Pérez. Arrived to CCU around 0040. IVF. IV ABX. 2 groin sites look great w/ palpable distal pulses. VSS. Afebrile. NSR. No significant pain. Daija Funez RN

## 2020-02-22 NOTE — BRIEF OP NOTE
**Emergency/ruptured aneurysm repair**    Unfit for open AAA repair:  No    Maximum AAA Diameter:  4 cm    Aortic Neck Length (Lowest renal to area where neck has increased by 10%):  22 mm    Aortic Neck Diameter (Largest outer Wall to outer wall in seal zone):  25 mm    Aorta-Neck Angle (Max angle between axis of suprarenal aorta and neck):  <45    Neck-AAA Angle (Maximum angle between axis of aneurysm neck and proximal portion of sac:  <45    Access Right  Percutaneious femoral (ultrasound guided)    If Open femoral, failed percutaneous:  N/A  Iliac adjunct:  None    Closure Device Type Right Abbott Perclose Proglide    Access Left  Percutaneious femoral (ultrasound guided)    If Open femoral, failed percutaneous:  N/A  Iliac adjunct:  None    Closure Device Type Left Abbott Perclose Proglide

## 2020-02-22 NOTE — ANESTHESIA POSTPROCEDURE EVALUATION
"Patient: Darryn Baxter    Procedure Summary     Date:  02/21/20 Room / Location[facility]  [facility]    Anesthesia Start:  2039 Anesthesia Stop:  2324    Procedure:  ruptured triple aneurysm endovascular repair (Thigh) Diagnosis:      Surgeon:  Kev Pérez MD Provider:  Richy Rea MD    Anesthesia Type:  general ASA Status:  4 - Emergent          Anesthesia Type: general    Vitals  Vitals Value Taken Time   /72 2/21/2020 11:45 PM   Temp 36.4 °C (97.6 °F) 2/21/2020 11:22 PM   Pulse 103 2/21/2020 11:48 PM   Resp 30 2/21/2020 11:30 PM   SpO2 98 % 2/21/2020 11:48 PM   Vitals shown include unvalidated device data.        Post Anesthesia Care and Evaluation    Patient location during evaluation: bedside  Patient participation: complete - patient participated  Level of consciousness: awake and alert  Pain management: adequate  Airway patency: patent  Anesthetic complications: No anesthetic complications    Cardiovascular status: acceptable  Respiratory status: acceptable  Hydration status: acceptable    Comments: /78 (BP Location: Left arm, Patient Position: Lying)   Pulse 117   Temp 36.4 °C (97.6 °F) (Oral)   Resp (!) 30   Ht 172.7 cm (68\")   Wt 110 kg (243 lb)   SpO2 96%   BMI 36.95 kg/m²       "

## 2020-02-22 NOTE — ED PROVIDER NOTES
EMERGENCY DEPARTMENT ENCOUNTER    CHIEF COMPLAINT  Chief Complaint: Abd pain  History given by: Patient  History limited by: None  Room Number: [facility] OR/MAIN OR  PMD: Darryn Nielsen MD    HPI:  Pt is a 64 y.o. male who presents complaining of having two episodes of severe nonradiating infraumbilical abd pain that occurred 3 days ago and last night. Since then, he has been having constant, moderate pain. Dr. Chavez had outpatient imaging at [facility] obtained today that was suggestive for a possible ruptured AAA. He was told to come to the ED to consult with Dr. Pérez (vascular). He is not anticoagulated and has not taken ASA in the last 5 days.    Duration: First occurred 3 days ago, then again last night  Onset: Gradual  Timing: Two severe episodes, now constant  Location: Infraumbilical abd  Radiation: None stated  Quality: None stated  Intensity/Severity: Two severe episodes, now moderate  Progression: Unchanged  Associated Symptoms: None stated  Aggravating Factors: None stated  Alleviating Factors: None stated  Previous Episodes: Dr. Chavez had outpatient imaging at [facility] obtained today that was suggestive for a possible leaking AAA. He was told to come to the ED to consult with Dr. Pérez (vascular).  Treatment before arrival: None    PAST MEDICAL HISTORY  Active Ambulatory Problems     Diagnosis Date Noted   • Essential hypertension 01/12/2016   • Anxiety 01/12/2016   • Impaired vision 01/12/2016   • Impaired fasting glucose 01/12/2016   • Blindness of both eyes 09/24/2015   • Diverticulitis of intestine with perforation and abscess 01/26/2018     Resolved Ambulatory Problems     Diagnosis Date Noted   • Diverticulitis of large intestine with perforation and abscess without bleeding 03/07/2018     Past Medical History:   Diagnosis Date   • Abscess    • Arthritis    • Floaters    • Gastric reflux    • History of cellulitis    • History of migraine    • Hypertension    • Lumbar herniated disc    •  RLF (retrolental fibroplasia)    • Vision impairment        PAST SURGICAL HISTORY  Past Surgical History:   Procedure Laterality Date   • APPENDECTOMY     • ARTHROSCOPY SHOULDER / OPEN SHOULDER Right     ROTATOR CUFF   • BACK SURGERY      LUMBAR   • CATARACT EXTRACTION      LEFT AND RIGHT   • CHOLECYSTECTOMY     • COLON RESECTION N/A 3/29/2018    Procedure: HAND ASSISTED LAPAROSCOPIC LOW ANTERIOR COLON RESECTION;  Surgeon: Oneil Hallman MD;  Location: [facility] OR;  Service: General   • COLONOSCOPY  2015   • FRACTURE SURGERY Left     ELBOW   • TONSILLECTOMY     • UMBILICAL HERNIA REPAIR N/A 10/5/2016    Procedure: UMBILICAL HERNIA REPAIR with mesh;  Surgeon: Oneil Hallman MD;  Location[facility] [place] [facility] [place]  Service:        FAMILY HISTORY  Family History   Problem Relation Age of Onset   • Diabetes Mother    • Leukemia Father    • Drug abuse Maternal Uncle    • Stroke Maternal Uncle    • Drug abuse Paternal Uncle    • Stroke Paternal Uncle    • Malig Hyperthermia Neg Hx        SOCIAL HISTORY  Social History     Socioeconomic History   • Marital status: Single     Spouse name: Not on file   • Number of children: Not on file   • Years of education: Not on file   • Highest education level: Not on file   Tobacco Use   • Smoking status: Current Every Day Smoker     Packs/day: 0.25     Years: 15.00     Pack years: 3.75     Types: Cigarettes   • Smokeless tobacco: Never Used   • Tobacco comment: 5 cigarettes   Substance and Sexual Activity   • Alcohol use: No     Comment: FORMER   16 YRS SOBER   • Drug use: No       ALLERGIES  Ether and Latex    REVIEW OF SYSTEMS  Review of Systems   Constitutional: Negative for activity change, appetite change and fever.   HENT: Negative for congestion and sore throat.    Eyes: Negative.    Respiratory: Negative for cough and shortness of breath.    Cardiovascular: Negative for chest pain and leg swelling.   Gastrointestinal: Positive for abdominal pain (nonradiating, infraumbilical  pain). Negative for diarrhea and vomiting.   Endocrine: Negative.    Genitourinary: Negative for decreased urine volume and dysuria.   Musculoskeletal: Negative for neck pain.   Skin: Negative for rash and wound.   Allergic/Immunologic: Negative.    Neurological: Negative for weakness, numbness and headaches.   Hematological: Negative.    Psychiatric/Behavioral: Negative.    All other systems reviewed and are negative.      PHYSICAL EXAM  ED Triage Vitals [02/21/20 1847]   Temp Heart Rate Resp BP SpO2   96.3 °F (35.7 °C) 98 17 -- 98 %      Temp src Heart Rate Source Patient Position BP Location FiO2 (%)   Tympanic Monitor -- -- --       Physical Exam   Constitutional: He is oriented to person, place, and time. No distress.   HENT:   Head: Normocephalic and atraumatic.   Mouth/Throat: Oropharynx is clear and moist.   Eyes: Pupils are equal, round, and reactive to light. EOM are normal.   Neck: Normal range of motion. Neck supple.   Cardiovascular: Normal rate, regular rhythm and normal heart sounds.   Pulmonary/Chest: Effort normal and breath sounds normal. No respiratory distress.   CTAB   Abdominal: Soft. There is tenderness (infraumbilical). There is guarding. There is no rebound.   Musculoskeletal: Normal range of motion. He exhibits no edema (pedal edema).   Cap refill is less than 2 on all toes.   Neurological: He is alert and oriented to person, place, and time. He has normal sensation and normal strength.   Skin: Skin is warm and dry.   Psychiatric: Mood and affect normal.   Nursing note and vitals reviewed.      LAB RESULTS  Lab Results (last 24 hours)     Procedure Component Value Units Date/Time    CBC & Differential [[id number]] Collected:  02/21/20 1915    Specimen:  Blood Updated:  02/21/20 2001    Narrative:       The following orders were created for panel order CBC & Differential.  Procedure                               Abnormality         Status                     ---------                                -----------         ------                     CBC Auto Differential[[id number]]        Abnormal            Final result                 Please view results for these tests on the individual orders.    Comprehensive Metabolic Panel [[id number]]  (Abnormal) Collected:  02/21/20 1915    Specimen:  Blood Updated:  02/21/20 2001     Glucose 102 mg/dL      BUN 23 mg/dL      Creatinine 1.21 mg/dL      Sodium 139 mmol/L      Potassium 4.2 mmol/L      Chloride 102 mmol/L      CO2 22.3 mmol/L      Calcium 9.5 mg/dL      Total Protein 7.3 g/dL      Albumin 4.60 g/dL      ALT (SGPT) 12 U/L      AST (SGOT) 13 U/L      Alkaline Phosphatase 55 U/L      Total Bilirubin 0.7 mg/dL      eGFR Non African Amer 60 mL/min/1.73      Globulin 2.7 gm/dL      A/G Ratio 1.7 g/dL      BUN/Creatinine Ratio 19.0     Anion Gap 14.7 mmol/L     Narrative:       GFR Normal >60  Chronic Kidney Disease <60  Kidney Failure <15      Protime-INR [[id number]]  (Normal) Collected:  02/21/20 1915    Specimen:  Blood Updated:  02/21/20 2001     Protime 13.3 Seconds      INR 1.04    Lipase [[id number]]  (Normal) Collected:  02/21/20 1915    Specimen:  Blood Updated:  02/21/20 2001     Lipase 23 U/L     Troponin [[id number]]  (Normal) Collected:  02/21/20 1915    Specimen:  Blood Updated:  02/21/20 2001     Troponin T 0.013 ng/mL     Narrative:       Troponin T Reference Range:  <= 0.03 ng/mL-   Negative for AMI  >0.03 ng/mL-     Abnormal for myocardial necrosis.  Clinicians would have to utilize clinical acumen, EKG, Troponin and serial changes to determine if it is an Acute Myocardial Infarction or myocardial injury due to an underlying chronic condition.       Results may be falsely decreased if patient taking Biotin.      CBC Auto Differential [[id number]]  (Abnormal) Collected:  02/21/20 1915    Specimen:  Blood Updated:  02/21/20 2001     WBC 10.40 10*3/mm3      RBC 4.53 10*6/mm3      Hemoglobin 15.0 g/dL      Hematocrit 44.4 %      MCV 98.0 fL      MCH  33.1 pg      MCHC 33.8 g/dL      RDW 13.4 %      RDW-SD 48.4 fl      MPV 9.8 fL      Platelets 195 10*3/mm3      Neutrophil % 59.9 %      Lymphocyte % 24.3 %      Monocyte % 10.3 %      Eosinophil % 4.6 %      Basophil % 0.4 %      Immature Grans % 0.5 %      Neutrophils, Absolute 6.23 10*3/mm3      Lymphocytes, Absolute 2.53 10*3/mm3      Monocytes, Absolute 1.07 10*3/mm3      Eosinophils, Absolute 0.48 10*3/mm3      Basophils, Absolute 0.04 10*3/mm3      Immature Grans, Absolute 0.05 10*3/mm3      nRBC 0.0 /100 WBC           I ordered the above labs and reviewed the results    RADIOLOGY  Xr Chest 1 View    Result Date: 2/21/2020  Narrative: ONE VIEW PORTABLE CHEST  HISTORY: Shortness of breath.  FINDINGS: The lungs are hyperinflated and clear and the heart is top normal in size. There is no acute disease.  This report was finalized on 2/21/2020 8:20 PM by Dr. Ritchie Ruano M.D.      I ordered the above noted radiological studies. Interpreted by radiologist. Reviewed by me in PACS.     PROCEDURES  Procedures  EKG    EKG time: 1922  Rhythm/Rate: NSR rate 76  RBBB  No Acute Ischemia  Non-Specific ST-T changes    Changed compared to prior on 3/26/2018. The QRS is now wider.    Interpreted Contemporaneously by me.  Independently viewed by me    PROGRESS AND CONSULTS     1912 Ordered EKG, labs, and CXR for further evaluation. Ordered IVF for hydration. Placed call to vascular surgery for consult.    1916 Discussed pt with Dr. Pérez, vascular surgery, who will consult with pt in ED.    1920 Dr. Pérez is now in the department and evaluating the patient at bedside.    1936 Per Dr. Pérez, pt is stable, but needs to have his AAA repaired. Plan to send pt to the OR.    1943 Rechecked with pt and informed him of the plan to send him to the OR and repair his AAA. Pt understands and agrees with the plan, all questions answered.    MEDICAL DECISION MAKING  Results were reviewed/discussed with the patient and they were also  made aware of online access. Pt also made aware that some labs, such as cultures, will not be resulted during ER visit and follow up with PMD is necessary.     MDM  Number of Diagnoses or Management Options  Ruptured abdominal aortic aneurysm (AAA) (CMS/HCC):      Amount and/or Complexity of Data Reviewed  Clinical lab tests: ordered and reviewed  Tests in the radiology section of CPT®: ordered and reviewed  Tests in the medicine section of CPT®: ordered and reviewed (See procedure notes for EKG.)  Decide to obtain previous medical records or to obtain history from someone other than the patient: yes  Review and summarize past medical records: yes (Reviewed pt CT report from [facility] done at 1515 that shows a infrarenal AAA with maximum diameter of 4 cm with considerable plaquing and a small amount of periaortic edema. There is a soft tissue density anteriorly which could reflect a small aneurysmal leak.)  Discuss the patient with other providers: yes (Dr. Pérez, vascular surgery.)    Patient Progress  Patient progress: stable         DIAGNOSIS  Final diagnoses:   Ruptured abdominal aortic aneurysm (AAA) (CMS/[facility])       DISPOSITION  SEND TO OR    Discussed treatment plan and reason for admission with pt/family and admitting physician.  Pt/family voiced understanding of the plan for admission for further testing/treatment as needed.     Latest Documented Vital Signs:  As of 8:46 PM  BP- (!) 155/105 HR- 71 Temp- 99.3 °F (37.4 °C) (Oral) O2 sat- 95%    --  Documentation assistance provided by frances Carlos for Dr. Wang.  Information recorded by the scribe was done at my direction and has been verified and validated by me.     Yuliana Carlos  02/21/20 2047       Mathew Wang MD  02/22/20 1636

## 2020-02-22 NOTE — NURSING NOTE
Pt arrived from er . Anesthesia here assesment started and Iv acess as well as Arterial line placed. MD here spoke with Wife. Consent ob tained and patient to Or alert .

## 2020-02-22 NOTE — PROGRESS NOTES
Name: Darryn Baxter ADMIT: 2020   : 1955  PCP: Darryn Nielsen MD    MRN: [id number] LOS: 1 days   AGE/SEX: 64 y.o. male  ROOM:  [place] [address]/     CC: Postop day #1 status post rEVAR  Interval History: No issues overnight.  Patient says his abdominal pain has essentially resolved.  Not taking much pain medication to speak of  Subjective   Review of Systems    Objective     Vital Signs  Temp:  [96.3 °F (35.7 °C)-99.3 °F (37.4 °C)] 97.8 °F (36.6 °C)  Heart Rate:  [] 80  Resp:  [16-30] 18  BP: (104-155)/() 115/80  Arterial Line BP: (107-233)/() 113/57    No intake/output data recorded.    Physical Exam  Vascular: Groins are ecchymotic.  Nontender.  Pedal pulses are palpable.  Abdomen soft    Data Reviewed:  CBC    Results from last 7 days   Lab Units 20  0448 20  1915   WBC 10*3/mm3 8.78 10.40   HEMOGLOBIN g/dL 11.9* 15.0   PLATELETS 10*3/mm3 158 195     BMP   Results from last 7 days   Lab Units 20  0448 20  1915   SODIUM mmol/L 138 139   POTASSIUM mmol/L 4.4 4.2   CHLORIDE mmol/L 106 102   CO2 mmol/L 18.8* 22.3   BUN mg/dL 20 23   CREATININE mg/dL 1.08 1.21   GLUCOSE mg/dL 129* 102*     Radiology(recent) No radiology results for the last day    Imaging Reviewed: None    Active Hospital Problems    Diagnosis  POA   • Ruptured abdominal aortic aneurysm (AAA) (CMS/[facility]) [I71.3]  Yes      Resolved Hospital Problems   No resolved problems to display.      Assessment/Plan   Billin, Post Op Global    Impression/Plan:  Postop day #1 status post emergent endovascular arctic aneurysm repair last night: This was not a free rupture although patient did have evidence of stranding and anterior rupture with a break in the calcium and abnormal findings at the level of the CLARE.  Pain is essentially resolved this morning.  Still suspicious that something else could have been causing his loose stools.    DC A-line, DC Lechuga, Hep-Lock IV, advance diet, transfer to 5  CRISTINE Pérez MD  02/22/20  8:18 AM  Office Number [phone]

## 2020-02-22 NOTE — H&P
Name: Darryn Baxter ADMIT: 2020   : 1955  PCP: Darryn Nielsen MD    MRN: [id number] LOS: 0 days   AGE/SEX: 64 y.o. male  ROOM: [facility]      Subjective     History of Present Illness  64 y.o. male whom I am seeing in the emergency room for a several day history of anterior abdominal pain.  His general surgeon saw him and was concern for potential diverticulitis.  He ordered a CT scan done at an outpatient imaging center.  The radiologist interpretation of this finding was a 4 cm aneurysm with some stranding in the periaortic tissue consistent with the potential rupture.  I asked that the patient come emergently to the emergency department for an evaluation.  He has had pain for several days.  He describes as a burning sensation.  He denies having this before.  This is also been coupled with nausea and liquid stools.    HPI Elements:  Location: Abdominal aorta  Severity: Severe  Duration: Several days  Context: Began with abdominal pain a few days ago and aortic aneurysm seen on CT scan today  Modifying Factors: Worse with pressure    Review of Systems   Constitutional: Positive for appetite change and fatigue. Negative for fever.   Eyes: Positive for visual disturbance (Blind).   Gastrointestinal: Positive for abdominal pain, diarrhea and nausea.   All other systems reviewed and are negative.      PMH: Gastroesophageal reflux disease, status post left colon surgery, blindness,    PSH: Appendectomy, back surgery, cataract extraction, cholecystectomy, hand-assisted laparoscopic low anterior colon resection, umbilical hernia repair    Family History: Diabetes, leukemia, and stroke    Social History: Patient smokes 5 cigarettes/day currently    Allergies: Ether and Latex      Objective   Temp:  [96.3 °F (35.7 °C)] 96.3 °F (35.7 °C)  Heart Rate:  [69-98] 90  Resp:  [17-18] 18  BP: (107-125)/(84-87) 125/87    No intake/output data recorded.    Physical Exam   Constitutional: He is oriented to  person, place, and time. He appears well-developed and well-nourished. No distress.   Eyes: Lids are normal.   Patient has an abnormal eye exam.  He is blind.   Neck: No JVD present.   Cardiovascular: Regular rhythm.   No murmur heard.  Pulses:       Carotid pulses are 2+ on the right side, and 2+ on the left side.       Femoral pulses are 2+ on the right side, and 2+ on the left side.  Chronic hemosiderin staining bilaterally.  No significant pedal edema.  Popliteal pulses are normal.   Pulmonary/Chest: Effort normal and breath sounds normal.   Abdominal: Normal appearance. There is tenderness in the periumbilical area. There is no rigidity and no guarding.   Musculoskeletal:   No digital cyanosis or clubbing   Neurological: He is alert and oriented to person, place, and time.   Psychiatric: He has a normal mood and affect.   Vitals reviewed.      Data Reviewed:  CBC      BMP     Radiology(recent) No radiology results for the last day    Imaging Studies:  Review of previous images from 2018 show that the aneurysm was mid aorta at about 3.5 cm in size.      Active Hospital Problems    Diagnosis  POA   • **AAA (abdominal aortic aneurysm, ruptured) (CMS/[facility]) [I71.3]  Yes      Resolved Hospital Problems   No resolved problems to display.     Problem Points:  4:  Patient has a new problem, with additional work-up planned  Total problem points:4 or more    Data Points:  1:  I have reviewed or order clinical lab test  2:  I have personally and independently review of image, tracing, or specimen  2:  I have reviewed and summation of old records and/or discussed the patients care with another health care provider  Total data points:4 or more    Risk Points:  High:  Major elective surgery with risk factors or emergent surgery    Billin    Assessment/Plan       AAA (abdominal aortic aneurysm, ruptured) (CMS/[facility])      64 y.o. male with an abdominal aortic aneurysm and several day history of abdominal pain.  Outside  radiology report read as concerning for anterior rupture.  Patient has some other symptoms that are not classic for aneurysm related pain including nausea and loose stools.  However, given his tender abdominal exam, aneurysm (even if only 4 cm) and a CT scan concerning for rupture I have recommended emergency repair.    CBC, BMP are pending.  Stat type and screen ordered.    I discussed the patients findings and my recommendations with patient, family and consulting provider.  Please call my office with any question: [phone]    Kev Pérez MD  02/21/20  7:52 PM

## 2020-02-22 NOTE — ANESTHESIA PROCEDURE NOTES
Airway  Urgency: elective    Date/Time: 2/21/2020 8:51 PM  Airway not difficult    General Information and Staff    Patient location during procedure: OR  Anesthesiologist: Mariola Mar MD    Indications and Patient Condition  Indications for airway management: airway protection    Preoxygenated: yes  Mask difficulty assessment: 1 - vent by mask    Final Airway Details  Final airway type: endotracheal airway      Successful airway: ETT  Cuffed: yes   Successful intubation technique: direct laryngoscopy  Facilitating devices/methods: intubating stylet  Endotracheal tube insertion site: oral  Blade: Lakshmi  Blade size: 3  ETT size (mm): 8.0  Placement verified by: chest auscultation and capnometry

## 2020-02-23 VITALS
RESPIRATION RATE: 24 BRPM | HEIGHT: 68 IN | OXYGEN SATURATION: 96 % | TEMPERATURE: 97.9 F | HEART RATE: 60 BPM | DIASTOLIC BLOOD PRESSURE: 72 MMHG | BODY MASS INDEX: 37.96 KG/M2 | SYSTOLIC BLOOD PRESSURE: 128 MMHG | WEIGHT: 250.44 LBS

## 2020-02-23 LAB
ANION GAP SERPL CALCULATED.3IONS-SCNC: 14 MMOL/L (ref 5–15)
BASOPHILS # BLD AUTO: 0.06 10*3/MM3 (ref 0–0.2)
BASOPHILS NFR BLD AUTO: 0.5 % (ref 0–1.5)
BUN BLD-MCNC: 20 MG/DL (ref 8–23)
BUN/CREAT SERPL: 20.4 (ref 7–25)
CALCIUM SPEC-SCNC: 8.4 MG/DL (ref 8.6–10.5)
CHLORIDE SERPL-SCNC: 105 MMOL/L (ref 98–107)
CO2 SERPL-SCNC: 22 MMOL/L (ref 22–29)
CREAT BLD-MCNC: 0.98 MG/DL (ref 0.76–1.27)
DEPRECATED RDW RBC AUTO: 48.9 FL (ref 37–54)
EOSINOPHIL # BLD AUTO: 0.18 10*3/MM3 (ref 0–0.4)
EOSINOPHIL NFR BLD AUTO: 1.6 % (ref 0.3–6.2)
ERYTHROCYTE [DISTWIDTH] IN BLOOD BY AUTOMATED COUNT: 13.4 % (ref 12.3–15.4)
GFR SERPL CREATININE-BSD FRML MDRD: 77 ML/MIN/1.73
GLUCOSE BLD-MCNC: 112 MG/DL (ref 65–99)
HCT VFR BLD AUTO: 34.1 % (ref 37.5–51)
HGB BLD-MCNC: 11.6 G/DL (ref 13–17.7)
IMM GRANULOCYTES # BLD AUTO: 0.03 10*3/MM3 (ref 0–0.05)
IMM GRANULOCYTES NFR BLD AUTO: 0.3 % (ref 0–0.5)
LYMPHOCYTES # BLD AUTO: 2.77 10*3/MM3 (ref 0.7–3.1)
LYMPHOCYTES NFR BLD AUTO: 24.1 % (ref 19.6–45.3)
MCH RBC QN AUTO: 33.7 PG (ref 26.6–33)
MCHC RBC AUTO-ENTMCNC: 34 G/DL (ref 31.5–35.7)
MCV RBC AUTO: 99.1 FL (ref 79–97)
MONOCYTES # BLD AUTO: 1.1 10*3/MM3 (ref 0.1–0.9)
MONOCYTES NFR BLD AUTO: 9.6 % (ref 5–12)
NEUTROPHILS # BLD AUTO: 7.35 10*3/MM3 (ref 1.7–7)
NEUTROPHILS NFR BLD AUTO: 63.9 % (ref 42.7–76)
NRBC BLD AUTO-RTO: 0 /100 WBC (ref 0–0.2)
PLATELET # BLD AUTO: 159 10*3/MM3 (ref 140–450)
PMV BLD AUTO: 10.1 FL (ref 6–12)
POTASSIUM BLD-SCNC: 3.9 MMOL/L (ref 3.5–5.2)
RBC # BLD AUTO: 3.44 10*6/MM3 (ref 4.14–5.8)
SODIUM BLD-SCNC: 141 MMOL/L (ref 136–145)
WBC NRBC COR # BLD: 11.49 10*3/MM3 (ref 3.4–10.8)

## 2020-02-23 PROCEDURE — 80048 BASIC METABOLIC PNL TOTAL CA: CPT | Performed by: SURGERY

## 2020-02-23 PROCEDURE — 94799 UNLISTED PULMONARY SVC/PX: CPT

## 2020-02-23 PROCEDURE — 25010000002 ENOXAPARIN PER 10 MG: Performed by: SURGERY

## 2020-02-23 PROCEDURE — 85025 COMPLETE CBC W/AUTO DIFF WBC: CPT | Performed by: SURGERY

## 2020-02-23 RX ORDER — BISACODYL 10 MG
10 SUPPOSITORY, RECTAL RECTAL DAILY PRN
Status: DISCONTINUED | OUTPATIENT
Start: 2020-02-23 | End: 2020-02-23 | Stop reason: HOSPADM

## 2020-02-23 RX ORDER — ATORVASTATIN CALCIUM 20 MG/1
20 TABLET, FILM COATED ORAL EVERY EVENING
Qty: 30 TABLET | Refills: 3 | Status: SHIPPED | OUTPATIENT
Start: 2020-02-23 | End: 2023-04-03 | Stop reason: SDUPTHER

## 2020-02-23 RX ORDER — ASPIRIN 81 MG/1
81 TABLET, CHEWABLE ORAL DAILY
Start: 2020-02-24 | End: 2020-08-03

## 2020-02-23 RX ORDER — BISACODYL 5 MG/1
10 TABLET, DELAYED RELEASE ORAL DAILY PRN
Status: DISCONTINUED | OUTPATIENT
Start: 2020-02-23 | End: 2020-02-23 | Stop reason: HOSPADM

## 2020-02-23 RX ADMIN — SODIUM CHLORIDE, POTASSIUM CHLORIDE, SODIUM LACTATE AND CALCIUM CHLORIDE 100 ML/HR: 600; 310; 30; 20 INJECTION, SOLUTION INTRAVENOUS at 04:48

## 2020-02-23 RX ADMIN — ENOXAPARIN SODIUM 40 MG: 40 INJECTION SUBCUTANEOUS at 09:05

## 2020-02-23 RX ADMIN — LISINOPRIL 20 MG: 10 TABLET ORAL at 09:04

## 2020-02-23 RX ADMIN — ACETAMINOPHEN 1000 MG: 500 TABLET, FILM COATED ORAL at 00:50

## 2020-02-23 RX ADMIN — ASPIRIN 81 MG: 81 TABLET, CHEWABLE ORAL at 09:05

## 2020-02-23 RX ADMIN — ACETAMINOPHEN 1000 MG: 500 TABLET, FILM COATED ORAL at 06:28

## 2020-02-23 RX ADMIN — ACETAMINOPHEN 1000 MG: 500 TABLET, FILM COATED ORAL at 12:45

## 2020-02-23 NOTE — DISCHARGE INSTRUCTIONS
[facility]  Ramesh Blake, Charli Castro Rachel, Scherrer Thomas  [address], [address]  [phone]    Endovascular Aneurysm Repair    Please refer to the following instructions for your post-procedure care.  Your surgeon and/or nurse will discuss any changes with you.    Activity  Avoid lifting more than five pounds (a gallon of milk) until after your first post-operative visit. You are encouraged to walk as much as you can. You can slowly return to normal activities but must avoid strenuous activity and heavy lifting until your doctor tells you it's  OK. Heavy lifting can hurt the incision and cause a problems. Avoid activities such as vacuuming, shoveling snow or swinging a golf club.     It is normal to feel tired for several weeks after your surgery. Do not drive until your doctor gives the OK and you are no longer taking prescription pain medications. It is also normal to have difficulty with sleep habits, eating, and bowel movements after surgery. These will go away with time.    Bathing/Showering  You may shower after you go home. Do not remove the bandages unless they begin to peel off. Do not soak in a bathtub, hot tub, or swim until the incision heals completely and it has been approved by your surgeon . Shower every day. Clean your incision with mild soap and water. Pat the area dry with a clean towel. You do not need a bandage unless otherwise instructed. Do not apply any ointments or creams to your incision.    Diet  Resume your normal diet. There are no special food restrictions following this procedure. A low fat/low cholesterol diet is recommended for all patients with  vascular disease.    After your aneurysm repair, it's normal to have a decrease in appetite. It's best to eat small, frequent meals over the course of the day. Call the office if you find that you are unable to eat even small meals.    Medications  Resume taking all of your medications unless your  doctor or nurse practitioner tells you not to. If your incision is causing pain, you may take over-the-counter pain relievers such as acetaminophen (Tylenol®).    If you were prescribed a stronger pain medication, please be aware these medications can cause nausea and constipation. Prevent nausea by taking the medication with a snack or meal. Avoid constipation by drinking plenty of fluids and eating foods  with a high amount of fiber, such as fruits, vegetables, and grains.    Take 100mg of the over-the-counter stool softener Colace® twice a day to help with constipation. Call the office if you continue to have constipation despite taking  the stool softener. A laxative, such as Milk of Magnesia, may be recommended for you at this time. Do not take a laxative unless your surgeon or nurse practitioner tells  you it's OK. Do not take Tylenol if you are taking stronger pain  medications (such as Percocet®).    Follow up  Endovascular aortic aneurysm repair (EVAR) is a non-curative treatment of infra-renal abdominal aortic aneurysm disease. When commercially available devices are used within their instructions for use, excellent long-term rupture free survival can be anticipated. However, due to the persistent presence of the aneurysm and the life-long risk of device related failure and/or endoleak, up to 20% of patients may experience some form of aorta related reintervention after EVAR. For this reason, long-term follow-up imaging after EVAR is mandatory. We stress the need for life-long imaging surveillance after EVAR care to ensure that patients achieve durable outcomes.    Please call us immediately for any of the following conditions  • Severe or worsening pain in your legs or feet or in your abdomen back or chest  • Increased pain, redness, drainage (pus) from your incision site   • Increased abdominal pain, bloating, nausea, vomiting, or persistent diarrhea  • Fever of 101 degrees or higher  • Swelling in your  leg(s)    Reduce your risk of vascular disease  • Stop smoking  • Manage your cholesterol  • Maintain a desired weight  • Control your diabetes  • Keep your blood pressure down    If you have any questions, please call the office  [phone]

## 2020-02-23 NOTE — PROGRESS NOTES
Discharge Planning Assessment  [facility]     Patient Name: Darryn Baxter  MRN: [id number]  Today's Date: 2/23/2020    Admit Date: 2/21/2020    Discharge Needs Assessment     Row Name 02/23/20 1302       Living Environment    Lives With  significant other    Name(s) of Who Lives With Patient  Terra Hernandez(significant other)     Current Living Arrangements  home/apartment/condo 11/2 story house with bedroom and bathroom on 1st floor and with 1 step to enter house    Primary Care Provided by  self    Provides Primary Care For  no one    Family Caregiver if Needed  significant other    Family Caregiver Names  Terra Hernandez(significant other)     Quality of Family Relationships  helpful;involved;supportive    Able to Return to Prior Arrangements  yes       Resource/Environmental Concerns    Resource/Environmental Concerns  none    Transportation Concerns  car, none       Transition Planning    Patient/Family Anticipates Transition to  home with family    Patient/Family Anticipated Services at Transition  none    Transportation Anticipated  family or friend will provide       Discharge Needs Assessment    Readmission Within the Last 30 Days  no previous admission in last 30 days    Concerns to be Addressed  no discharge needs identified;denies needs/concerns at this time    Equipment Currently Used at Home  cane, straight    Anticipated Changes Related to Illness  none    Equipment Needed After Discharge  none    Provided post acute provider list?  Refused        Discharge Plan     Row Name 02/23/20 5639       Plan    Plan  Home with significant other, Terra    Provided post acute provider list?  -- refused home health    Patient/Family in Agreement with Plan  yes    Plan Comments  Introduced self and explained role of CCP, IMM checked and facesheet verified with patient at chairside.  Patient states his emergency contact is his significant other, Terra Hernandez([phone]) and he lives with her in a 11/2 story house with his  bedroom and bathroom on the 1st floor and has 1 step to enter house.  Patient states he is independent with all ADLs except driving and his significant other provides that for him as he is legally blind.  Patient states he does have a straight cane, but doesn't use it and denies any DME needs at discharge.  Patient states his PCP is Darryn Nielsen MD and he uses [facility] at [address] [place] [address] and [place], [place] and denies any issues with affording or obtaining medications.  Patient denies needing or wanting home health or rehab at discharge and plans to return home with significant other, who will provide transportation for him at discharge.... Dianne Mendoza RN,CCP         Destination      Coordination has not been started for this encounter.      Durable Medical Equipment      Coordination has not been started for this encounter.      Dialysis/Infusion      Coordination has not been started for this encounter.      Home Medical Care      Coordination has not been started for this encounter.      Therapy      Coordination has not been started for this encounter.      Community Resources      Coordination has not been started for this encounter.        Expected Discharge Date and Time     Expected Discharge Date Expected Discharge Time    Feb 23, 2020         Demographic Summary     Row Name 02/23/20 1308       General Information    Admission Type  inpatient    Arrived From  home    Required Notices Provided  Important Message from Medicare    Referral Source  nursing    Reason for Consult  discharge planning    Preferred Language  English     Used During This Interaction  no       Contact Information    Permission Granted to Share Info With          Functional Status     Row Name 02/23/20 1308       Functional Status    Usual Activity Tolerance  good    Current Activity Tolerance  moderate       Functional Status, IADL    Medications  independent    Meal Preparation   independent    Housekeeping  independent    Laundry  independent    Shopping  independent       Mental Status    General Appearance WDL  WDL       Mental Status Summary    Recent Changes in Mental Status/Cognitive Functioning  no changes        Psychosocial    No documentation.       Abuse/Neglect    No documentation.       Legal    No documentation.       Substance Abuse    No documentation.       Patient Forms    No documentation.           Dianne Mendoza RN

## 2020-02-23 NOTE — PROGRESS NOTES
"  PROGRESS NOTE  Patient Name: Darryn Baxter  Age/Sex: 64 y.o. male  : 1955  MRN: [id number]    Date of Admission: 2020  Date of Encounter Visit: 20   LOS: 2 days   Patient Care Team:  Darryn Nielsen MD as PCP - General (Family Medicine)  Darryn Nielsen MD as PCP - Claims Attributed    Chief Complaint: Constipation    Hospital course: Patient came in with abdominal aortic aneurysm possible rupture, had an endovascular procedure with stenting and has been doing fine from the hemodynamic standpoint, creatinine is within acceptable range, and the hemoglobin did drop but he has no hemodynamic instability.    Interval History: Drop in the hemoglobin, abdominal distention and constipation    REVIEW OF SYSTEMS:   CONSTITUTIONAL: no fever or chills  CARDIOVASCULAR: No chest pain, chest pressure or chest discomfort. No palpitations or edema.   RESPIRATORY: No shortness of breath, cough or sputum.   GASTROINTESTINAL: No anorexia, nausea, vomiting or diarrhea. No abdominal pain or blood.  He does have abdominal distention and has not had a bowel movement for a couple of days, he is usually regular and has 2 bowel movement per day  HEMATOLOGIC: No bleeding or bruising.     Ventilator/Non-Invasive Ventilation Settings (From admission, onward)    None            Vital Signs  Temp:  [97.4 °F (36.3 °C)-98.9 °F (37.2 °C)] 97.4 °F (36.3 °C)  Heart Rate:  [] 68  Resp:  [22-24] 24  BP: (102-123)/(58-76) 123/76  Arterial Line BP: (106-127)/(57-64) 113/60  SpO2:  [94 %-97 %] 94 %  on    Device (Oxygen Therapy): room air    Intake/Output Summary (Last 24 hours) at 2020 0952  Last data filed at 2020 0449  Gross per 24 hour   Intake 3276.11 ml   Output 1085 ml   Net 2191.11 ml     Flowsheet Rows      First Filed Value   Admission Height  172.7 cm (68\") Documented at 2020   Admission Weight  110 kg (243 lb) Documented at 2020        Body mass index is 38.08 kg/m².      20 " 02/22/20  0041 02/23/20 0449   Weight: 110 kg (243 lb) 112 kg (246 lb 14.6 oz) 114 kg (250 lb 7.1 oz)       Physical Exam:  GEN:  No acute distress, alert, cooperative, well developed   EYES:   Sclerae clear. No icterus. PERRL. Normal EOM  ENT:   External ears/nose normal, no oral lesions, no thrush, mucous membranes moist  NECK:  Supple, midline trachea, no JVD  LUNGS: Normal chest on inspection, CTAB, no wheezes. No rhonchi. No crackles. Respirations regular, even and unlabored.   CV:  Regular rhythm and rate. Normal S1/S2. No murmurs, gallops, or rubs noted.  ABD:  Soft, nontender, abdomen is distended, his umbilical hernia is bulging more than usual. Normal bowel sounds. No guarding  EXT:  Moves all extremities well. No cyanosis. No redness.  Trace edema of the lower extremities.   Skin: Dry, intact, no bleeding    Results Review:      Results from last 7 days   Lab Units 02/22/20 0448 02/21/20 1915   SODIUM mmol/L 138 139   POTASSIUM mmol/L 4.4 4.2   CHLORIDE mmol/L 106 102   CO2 mmol/L 18.8* 22.3   BUN mg/dL 20 23   CREATININE mg/dL 1.08 1.21   CALCIUM mg/dL 8.6 9.5   AST (SGOT) U/L  --  13   ALT (SGPT) U/L  --  12   ANION GAP mmol/L 13.2 14.7   ALBUMIN g/dL  --  4.60     Results from last 7 days   Lab Units 02/21/20 1915   TROPONIN T ng/mL 0.013             Results from last 7 days   Lab Units 02/22/20 0448 02/21/20 1915   WBC 10*3/mm3 8.78 10.40   HEMOGLOBIN g/dL 11.9* 15.0   HEMATOCRIT % 34.9* 44.4   PLATELETS 10*3/mm3 158 195   MCV fL 96.1 98.0*   NEUTROPHIL % % 88.8* 59.9   LYMPHOCYTE % % 8.5* 24.3   MONOCYTES % % 1.9* 10.3   EOSINOPHIL % % 0.1* 4.6   BASOPHIL % % 0.1 0.4   IMM GRAN % % 0.6* 0.5     Results from last 7 days   Lab Units 02/21/20  1915   INR  1.04               Invalid input(s): LDLCALC          Glucose   Date/Time Value Ref Range Status   02/22/2020 0617 153 (H) 70 - 130 mg/dL Final   02/22/2020 0050 122 70 - 130 mg/dL Final             Results from last 7 days   Lab Units  02/22/20  0159   NITRITE UA  Negative   WBC UA /HPF 0-2   BACTERIA UA /HPF None Seen   SQUAM EPITHEL UA /HPF 0-2               Imaging:   Imaging Results (All)     Procedure Component Value Units Date/Time    Arteriogram (Autofinalize) [[id number]] Resulted:  02/21/20 2321     Updated:  02/21/20 2321    Narrative:       This procedure was auto-finalized with no dictation required.    XR Chest 1 View [[id number]] Collected:  02/21/20 2019     Updated:  02/21/20 2024    Narrative:       ONE VIEW PORTABLE CHEST     HISTORY: Shortness of breath.     FINDINGS: The lungs are hyperinflated and clear and the heart is top  normal in size. There is no acute disease.     This report was finalized on 2/21/2020 8:20 PM by Dr. Ritchie Ruano M.D.             I reviewed the patient's new clinical results.  I personally viewed and interpreted the patient's imaging results:        Medication Review:     acetaminophen 1,000 mg Oral Q6H   aspirin 81 mg Oral Daily   atorvastatin 20 mg Oral Q PM   enoxaparin 40 mg Subcutaneous Daily   lisinopril 20 mg Oral Daily         lactated ringers 9 mL/hr    lactated ringers 100 mL/hr Last Rate: 100 mL/hr (02/23/20 0448)   sodium chloride 125 mL/hr Last Rate: 100 mL/hr (02/21/20 2224)       ASSESSMENT:   1. Abdominal aortic aneurysm with partial rupture status post percutaneous repair  2. Essential hypertension  3. Abdominal distention constipation  4. Obesity  5. Snoring    PLAN:  Patient is doing pretty well from the hemodynamic standpoint, the hemoglobin did drop and that will be continued to be monitored, but this can be in part dilutional, with the normal creatinine today we will discontinue the IV fluids specially with good p.o. intake and we will repeat chemistry in the morning  He needs to start moving his bowels, he will be started on some laxatives and will consider further measures accordingly  Patient is cleared to transfer out of the CCU once cleared by vascular surgery  We will check  overnight oximetry on room air tonight    Discussed with patient in details    Disposition: Stepdown unit once okay with vascular    Jordan Jiménez MD  02/23/20  9:52 AM        Dictated utilizing Dragon dictation

## 2020-02-23 NOTE — PROGRESS NOTES
Name: Darryn Baxter ADMIT: 2020   : 1955  PCP: Darryn Nielsen MD    MRN: [id number] LOS: 2 days   AGE/SEX: 64 y.o. male  ROOM:  [place] [address]/1     CC: Postop day #1 status post rEVAR  Interval History: Continues to have no pain.  Up in a chair and walking around  Subjective   Review of Systems    Objective     Vital Signs  Temp:  [97.4 °F (36.3 °C)-98.9 °F (37.2 °C)] 97.4 °F (36.3 °C)  Heart Rate:  [65-83] 68  Resp:  [22-24] 24  BP: (102-123)/(58-76) 123/76  Arterial Line BP: (106-113)/(57-63) 113/60    I/O this shift:  In: 480 [P.O.:480]  Out: 300 [Urine:300]    Physical Exam  Vascular: Groin soft, abdomen soft    Data Reviewed:  CBC    Results from last 7 days   Lab Units 20  0448 20  1915   WBC 10*3/mm3 8.78 10.40   HEMOGLOBIN g/dL 11.9* 15.0   PLATELETS 10*3/mm3 158 195     BMP   Results from last 7 days   Lab Units 20  0448 20  1915   SODIUM mmol/L 138 139   POTASSIUM mmol/L 4.4 4.2   CHLORIDE mmol/L 106 102   CO2 mmol/L 18.8* 22.3   BUN mg/dL 20 23   CREATININE mg/dL 1.08 1.21   GLUCOSE mg/dL 129* 102*     Radiology(recent) No radiology results for the last day    Imaging Reviewed: None    Active Hospital Problems    Diagnosis  POA   • Ruptured abdominal aortic aneurysm (AAA) (CMS/HCC) [I71.3]  Yes      Resolved Hospital Problems   No resolved problems to display.      Assessment/Plan   Billin, Post Op Global    Impression/Plan:  Postop day #2 status post contained rupture of abdominal aortic aneurysm.  Patient has done extremely well.  We will plan to discharge him today.    Kev Pérez MD  20  10:19 AM  Office Number [phone]

## 2020-02-23 NOTE — DISCHARGE SUMMARY
Name: Darryn Baxter ADMIT: 2020   : 1955  PCP: Darryn Nielsen MD    MRN: [id number] LOS: 2 days   AGE/SEX: 64 y.o. male  Location: [facility]     Date of Admission: 2020  Date of Discharge:  2020    PCP: Darryn Nielsen MD      DISCHARGE DIAGNOSIS  Active Hospital Problems    Diagnosis  POA   • Ruptured abdominal aortic aneurysm (AAA) (CMS/[facility]) [I71.3]  Yes      Resolved Hospital Problems   No resolved problems to display.       SECONDARY DIAGNOSES  Past Medical History:   Diagnosis Date   • Abscess     COLONIC   • Arthritis    • Floaters     VISION BETTER IN RIGHT VS LEFT   • Gastric reflux     ON OCCASION   • History of cellulitis     LEFT ANKLE ABOUT 10 YRS AGO   • History of migraine     VERY LIGHT SENSITIVE   • Hypertension    • Lumbar herniated disc    • RLF (retrolental fibroplasia)     SINCE CHILDHOOD   • Vision impairment        CONSULTS   Consults     Date and Time Order Name Status Description    2020 0007 Inpatient Intensivist Consult Completed     2020 191 Surgery (on-call MD unless specified) Completed           PROCEDURES PERFORMED    Date: 2020, endovascular repair of ruptured abdominal aortic aneurysm    HOSPITAL COURSE  Patient is a 64 y.o. male presented to [facility] admitted for abdominal pain and concern for anterior blush on his 4 cm aneurysm associated with stranding the patient had severe abdominal pain and blood pressure was a bit on the low side.  It was not clear to me that all of his symptoms were related to this because he also had some loose stools but in this setting I felt like he required emergent repair. Please see the admitting history and physical for further details.  He was taken to the operating room emergently at night and we did a percutaneous endovascular repair of his abdominal aortic aneurysm.  He was stable enough for an aortic occlusion balloon did not have to be used.  He tolerated the procedure well and went to the  "intensive care unit in stable condition.  He was extubated.  Postoperative day #1 he said that his pain has completely resolved.  Hemoglobin was stable.  Abdomen was now soft.  I kept him for another day to make sure he continued to do well and indeed he did.  Plan for discharge today.  I added aspirin and statin for his postoperative risk reduction.      VITAL SIGNS  /72   Pulse 60   Temp 97.9 °F (36.6 °C) (Oral)   Resp 24   Ht 172.7 cm (68\")   Wt 114 kg (250 lb 7.1 oz)   SpO2 96%   BMI 38.08 kg/m²   Objective  CONDITION ON DISCHARGE  Stable.      DISCHARGE DISPOSITION   Home or Self Care      DISCHARGE MEDICATIONS     Discharge Medications      New Medications      Instructions Start Date   atorvastatin 20 MG tablet  Commonly known as:  LIPITOR   20 mg, Oral, Every Evening         Changes to Medications      Instructions Start Date   aspirin 325 MG tablet  What changed:  Another medication with the same name was added. Make sure you understand how and when to take each.   325 mg, Oral, As Needed      aspirin 81 MG chewable tablet  What changed:  You were already taking a medication with the same name, and this prescription was added. Make sure you understand how and when to take each.   81 mg, Oral, Daily   Start Date:  February 24, 2020        Continue These Medications      Instructions Start Date   acetaminophen 325 MG tablet  Commonly known as:  TYLENOL   650 mg, Oral, Every 6 Hours PRN      albuterol sulfate  (90 Base) MCG/ACT inhaler  Commonly known as:  PROVENTIL HFA;VENTOLIN HFA;PROAIR HFA   2 puffs, Inhalation, Every 4 Hours PRN      calcium polycarbophil 625 MG tablet  Commonly known as:  FIBERCON   625 mg, Oral, Daily      desoximetasone 0.25 % cream  Commonly known as:  TOPICORT   Topical, 2 Times Daily      ibuprofen 200 MG tablet  Commonly known as:  ADVIL,MOTRIN   800 mg, Oral, Every 6 Hours PRN, HELD FOR SURGERY       lisinopril 20 MG tablet  Commonly known as:  " PRINIVIL,ZESTRIL   20 mg, Oral, Daily      vitamin C 500 MG tablet  Commonly known as:  ASCORBIC ACID   1,000 mg, Oral, Daily         ASK your doctor about these medications      Instructions Start Date   predniSONE 20 MG tablet  Commonly known as:  DELTASONE   20 mg, Oral, 2 Times Daily              Future Appointments   Date Time Provider Department Center   8/3/2020  2:15 PM Darryn Nielsen MD [id number] None     Follow-up Information     Darryn Nielsen MD .    Specialty:  Family Medicine  Contact information:  [address] [facility] [address]  [facility] [id number] [phone]             Kev Pérez MD Follow up.    Specialty:  Vascular Surgery  Why:  2-3 weeks  Contact information:  [address] [facility] [address]  [facility] [id number] [phone]                   TEST  RESULTS PENDING AT DISCHARGE   Order Current Status    CBC & Differential In process    CBC Auto Differential In process           Billin, Post Op Global    Kev Pérez MD  Office Number [phone]    20  12:03 PM

## 2020-02-24 ENCOUNTER — TRANSITIONAL CARE MANAGEMENT TELEPHONE ENCOUNTER (OUTPATIENT)
Dept: FAMILY MEDICINE CLINIC | Facility: CLINIC | Age: 65
End: 2020-02-24

## 2020-02-24 LAB — ACT BLD: 208 SECONDS (ref 82–152)

## 2020-02-24 NOTE — OUTREACH NOTE
Spoke with pt, doing very well s/p emergent AAA sx. He is feeling good, still quite weak, but appetite is good and he is staying well hydrated. Very minimal pain is controlled with OTC medications. He is taking several laps around his house every day as instructed. Confirmed receipt and understanding of d/c and post op instructions and medications. Pt did sched hospital fwp for 03/16/20, well out of TCM time frame, but would not sched any sooner.

## 2020-02-25 NOTE — PROGRESS NOTES
Case Management Discharge Note      Final Note: Home no needs    Provided Post Acute Provider List?: (refused home health)    Destination      No service has been selected for the patient.      Durable Medical Equipment      No service has been selected for the patient.      Dialysis/Infusion      No service has been selected for the patient.      Home Medical Care      No service has been selected for the patient.      Therapy      No service has been selected for the patient.      Community Resources      No service has been selected for the patient.             Final Discharge Disposition Code: 01 - home or self-care

## 2020-03-16 ENCOUNTER — OFFICE VISIT (OUTPATIENT)
Dept: FAMILY MEDICINE CLINIC | Facility: CLINIC | Age: 65
End: 2020-03-16

## 2020-03-16 VITALS
OXYGEN SATURATION: 98 % | RESPIRATION RATE: 16 BRPM | DIASTOLIC BLOOD PRESSURE: 71 MMHG | SYSTOLIC BLOOD PRESSURE: 127 MMHG | WEIGHT: 243 LBS | HEART RATE: 79 BPM | TEMPERATURE: 97.8 F | HEIGHT: 68 IN | BODY MASS INDEX: 36.83 KG/M2

## 2020-03-16 DIAGNOSIS — I71.30 RUPTURED ABDOMINAL AORTIC ANEURYSM (AAA) (HCC): ICD-10-CM

## 2020-03-16 DIAGNOSIS — K14.8 LESION OF TONGUE: Primary | ICD-10-CM

## 2020-03-16 DIAGNOSIS — E66.01 MORBIDLY OBESE (HCC): ICD-10-CM

## 2020-03-16 DIAGNOSIS — I71.30 AAA (ABDOMINAL AORTIC ANEURYSM, RUPTURED) (HCC): ICD-10-CM

## 2020-03-16 PROCEDURE — 99213 OFFICE O/P EST LOW 20 MIN: CPT | Performed by: FAMILY MEDICINE

## 2020-03-16 NOTE — PROGRESS NOTES
Subjective   Chief Complaint:   Chief Complaint   Patient presents with   • Ruptured abdominal aortic aneurysm         History of Present Illness patient comes to the office with a large lesion on the left side of his tongue probably from wearing a new set of dentures are about a year old.  Has a large lesion on the left side of his tongue and it is very tender I barely touched his tongue and he had significant pain.  He is got a lesion that needs to be seen by an oral surgeon I called Dr. COWAN  oral surgeon   AND   talk to their  and they are again to see him and give him an appointment to be seen.  He will be seen by Dr. Cowan in the a.m. and have this lesion looked at.  So had an abdominal aortic aneurysm and it was seen by Dr. Oneil Michael and then Dr. Oneil Michael refer this to Dr. Pérez who did immediate surgery on his abdominal aortic aneurysm at [facility], operated by [facility].      Current outpatient and discharge medications have been reconciled for the patient.  Reviewed by: Keiry Florez        Past Medical History:   Diagnosis Date   • Abscess     COLONIC   • Arthritis    • Floaters     VISION BETTER IN RIGHT VS LEFT   • Gastric reflux     ON OCCASION   • History of cellulitis     LEFT ANKLE ABOUT 10 YRS AGO   • History of migraine     VERY LIGHT SENSITIVE   • Hypertension    • Lumbar herniated disc    • RLF (retrolental fibroplasia)     SINCE CHILDHOOD   • Vision impairment         Darryn Baxter 64 y.o. male who presents today for Hospital Follow up foRuptured abdominal aortic aneurysm   No diagnosis found.     he has a problem list of   Patient Active Problem List   Diagnosis   • Essential hypertension   • Anxiety   • Impaired vision   • Impaired fasting glucose   • Blindness of both eyes   • Diverticulitis of intestine with perforation and abscess   • AAA (abdominal aortic aneurysm, ruptured) (CMS/[facility])   • Ruptured abdominal aortic aneurysm (AAA) (CMS/[facility])   .    he has been compliant with   Current  "Outpatient Medications:   •  acetaminophen (TYLENOL) 325 MG tablet, Take 650 mg by mouth Every 6 (Six) Hours As Needed for Mild Pain  (AS NEEDED)., Disp: , Rfl:   •  albuterol sulfate  (90 Base) MCG/ACT inhaler, Inhale 2 puffs Every 4 (Four) Hours As Needed for Wheezing., Disp: 1 inhaler, Rfl: 0  •  aspirin 325 MG tablet, Take 325 mg by mouth As Needed for Mild Pain ., Disp: , Rfl:   •  aspirin 81 MG chewable tablet, Chew 1 tablet Daily., Disp: , Rfl:   •  atorvastatin (LIPITOR) 20 MG tablet, Take 1 tablet by mouth Every Evening., Disp: 30 tablet, Rfl: 3  •  calcium polycarbophil (FIBERCON) 625 MG tablet, Take 625 mg by mouth Daily., Disp: , Rfl:   •  desoximetasone (TOPICORT) 0.25 % cream, Apply  topically to the appropriate area as directed 2 (Two) Times a Day., Disp: 1 each, Rfl: 3  •  ibuprofen (ADVIL,MOTRIN) 200 MG tablet, Take 800 mg by mouth Every 6 (Six) Hours As Needed for Mild Pain . HELD FOR SURGERY, Disp: , Rfl:   •  lisinopril (PRINIVIL,ZESTRIL) 20 MG tablet, Take 1 tablet by mouth Daily., Disp: 90 tablet, Rfl: 1  •  predniSONE (DELTASONE) 20 MG tablet, Take 1 tablet by mouth 2 (Two) Times a Day., Disp: 10 tablet, Rfl: 0  •  vitamin C (ASCORBIC ACID) 500 MG tablet, Take 1,000 mg by mouth Daily., Disp: , Rfl: .  he denies medication side effects.        /71   Pulse 79   Temp 97.8 °F (36.6 °C)   Resp 16   Ht 172.7 cm (68\")   Wt 110 kg (243 lb)   SpO2 98%   BMI 36.95 kg/m²     Results for orders placed or performed during the hospital encounter of 02/21/20   Comprehensive Metabolic Panel   Result Value Ref Range    Glucose 102 (H) 65 - 99 mg/dL    BUN 23 8 - 23 mg/dL    Creatinine 1.21 0.76 - 1.27 mg/dL    Sodium 139 136 - 145 mmol/L    Potassium 4.2 3.5 - 5.2 mmol/L    Chloride 102 98 - 107 mmol/L    CO2 22.3 22.0 - 29.0 mmol/L    Calcium 9.5 8.6 - 10.5 mg/dL    Total Protein 7.3 6.0 - 8.5 g/dL    Albumin 4.60 3.50 - 5.20 g/dL    ALT (SGPT) 12 1 - 41 U/L    AST (SGOT) 13 1 - 40 U/L    " Alkaline Phosphatase 55 39 - 117 U/L    Total Bilirubin 0.7 0.2 - 1.2 mg/dL    eGFR Non African Amer 60 (L) >60 mL/min/1.73    Globulin 2.7 gm/dL    A/G Ratio 1.7 g/dL    BUN/Creatinine Ratio 19.0 7.0 - 25.0    Anion Gap 14.7 5.0 - 15.0 mmol/L   Protime-INR   Result Value Ref Range    Protime 13.3 11.7 - 14.2 Seconds    INR 1.04 0.90 - 1.10   Lipase   Result Value Ref Range    Lipase 23 13 - 60 U/L   Urinalysis With Microscopic If Indicated (No Culture) - Urine, Clean Catch   Result Value Ref Range    Color, UA Yellow Yellow, Straw    Appearance, UA Clear Clear    pH, UA <=5.0 5.0 - 8.0    Specific Gravity, UA 1.029 1.005 - 1.030    Glucose, UA Negative Negative    Ketones, UA Negative Negative    Bilirubin, UA Negative Negative    Blood, UA Small (1+) (A) Negative    Protein, UA Negative Negative    Leuk Esterase, UA Negative Negative    Nitrite, UA Negative Negative    Urobilinogen, UA 0.2 E.U./dL 0.2 - 1.0 E.U./dL   Troponin   Result Value Ref Range    Troponin T 0.013 0.000 - 0.030 ng/mL   CBC Auto Differential   Result Value Ref Range    WBC 10.40 3.40 - 10.80 10*3/mm3    RBC 4.53 4.14 - 5.80 10*6/mm3    Hemoglobin 15.0 13.0 - 17.7 g/dL    Hematocrit 44.4 37.5 - 51.0 %    MCV 98.0 (H) 79.0 - 97.0 fL    MCH 33.1 (H) 26.6 - 33.0 pg    MCHC 33.8 31.5 - 35.7 g/dL    RDW 13.4 12.3 - 15.4 %    RDW-SD 48.4 37.0 - 54.0 fl    MPV 9.8 6.0 - 12.0 fL    Platelets 195 140 - 450 10*3/mm3    Neutrophil % 59.9 42.7 - 76.0 %    Lymphocyte % 24.3 19.6 - 45.3 %    Monocyte % 10.3 5.0 - 12.0 %    Eosinophil % 4.6 0.3 - 6.2 %    Basophil % 0.4 0.0 - 1.5 %    Immature Grans % 0.5 0.0 - 0.5 %    Neutrophils, Absolute 6.23 1.70 - 7.00 10*3/mm3    Lymphocytes, Absolute 2.53 0.70 - 3.10 10*3/mm3    Monocytes, Absolute 1.07 (H) 0.10 - 0.90 10*3/mm3    Eosinophils, Absolute 0.48 (H) 0.00 - 0.40 10*3/mm3    Basophils, Absolute 0.04 0.00 - 0.20 10*3/mm3    Immature Grans, Absolute 0.05 0.00 - 0.05 10*3/mm3    nRBC 0.0 0.0 - 0.2 /100 WBC    Basic Metabolic Panel   Result Value Ref Range    Glucose 129 (H) 65 - 99 mg/dL    BUN 20 8 - 23 mg/dL    Creatinine 1.08 0.76 - 1.27 mg/dL    Sodium 138 136 - 145 mmol/L    Potassium 4.4 3.5 - 5.2 mmol/L    Chloride 106 98 - 107 mmol/L    CO2 18.8 (L) 22.0 - 29.0 mmol/L    Calcium 8.6 8.6 - 10.5 mg/dL    eGFR Non African Amer 68 >60 mL/min/1.73    BUN/Creatinine Ratio 18.5 7.0 - 25.0    Anion Gap 13.2 5.0 - 15.0 mmol/L   CBC Auto Differential   Result Value Ref Range    WBC 8.78 3.40 - 10.80 10*3/mm3    RBC 3.63 (L) 4.14 - 5.80 10*6/mm3    Hemoglobin 11.9 (L) 13.0 - 17.7 g/dL    Hematocrit 34.9 (L) 37.5 - 51.0 %    MCV 96.1 79.0 - 97.0 fL    MCH 32.8 26.6 - 33.0 pg    MCHC 34.1 31.5 - 35.7 g/dL    RDW 13.1 12.3 - 15.4 %    RDW-SD 46.2 37.0 - 54.0 fl    MPV 10.3 6.0 - 12.0 fL    Platelets 158 140 - 450 10*3/mm3    Neutrophil % 88.8 (H) 42.7 - 76.0 %    Lymphocyte % 8.5 (L) 19.6 - 45.3 %    Monocyte % 1.9 (L) 5.0 - 12.0 %    Eosinophil % 0.1 (L) 0.3 - 6.2 %    Basophil % 0.1 0.0 - 1.5 %    Immature Grans % 0.6 (H) 0.0 - 0.5 %    Neutrophils, Absolute 7.79 (H) 1.70 - 7.00 10*3/mm3    Lymphocytes, Absolute 0.75 0.70 - 3.10 10*3/mm3    Monocytes, Absolute 0.17 0.10 - 0.90 10*3/mm3    Eosinophils, Absolute 0.01 0.00 - 0.40 10*3/mm3    Basophils, Absolute 0.01 0.00 - 0.20 10*3/mm3    Immature Grans, Absolute 0.05 0.00 - 0.05 10*3/mm3    nRBC 0.0 0.0 - 0.2 /100 WBC   Urinalysis, Microscopic Only - Urine, Clean Catch   Result Value Ref Range    RBC, UA 6-12 (A) None Seen, 0-2 /HPF    WBC, UA 0-2 None Seen, 0-2 /HPF    Bacteria, UA None Seen None Seen /HPF    Squamous Epithelial Cells, UA 0-2 None Seen, 0-2 /HPF    Hyaline Casts, UA 0-2 None Seen /LPF    Methodology Automated Microscopy    Basic Metabolic Panel   Result Value Ref Range    Glucose 112 (H) 65 - 99 mg/dL    BUN 20 8 - 23 mg/dL    Creatinine 0.98 0.76 - 1.27 mg/dL    Sodium 141 136 - 145 mmol/L    Potassium 3.9 3.5 - 5.2 mmol/L    Chloride 105 98 - 107 mmol/L     CO2 22.0 22.0 - 29.0 mmol/L    Calcium 8.4 (L) 8.6 - 10.5 mg/dL    eGFR Non African Amer 77 >60 mL/min/1.73    BUN/Creatinine Ratio 20.4 7.0 - 25.0    Anion Gap 14.0 5.0 - 15.0 mmol/L   CBC Auto Differential   Result Value Ref Range    WBC 11.49 (H) 3.40 - 10.80 10*3/mm3    RBC 3.44 (L) 4.14 - 5.80 10*6/mm3    Hemoglobin 11.6 (L) 13.0 - 17.7 g/dL    Hematocrit 34.1 (L) 37.5 - 51.0 %    MCV 99.1 (H) 79.0 - 97.0 fL    MCH 33.7 (H) 26.6 - 33.0 pg    MCHC 34.0 31.5 - 35.7 g/dL    RDW 13.4 12.3 - 15.4 %    RDW-SD 48.9 37.0 - 54.0 fl    MPV 10.1 6.0 - 12.0 fL    Platelets 159 140 - 450 10*3/mm3    Neutrophil % 63.9 42.7 - 76.0 %    Lymphocyte % 24.1 19.6 - 45.3 %    Monocyte % 9.6 5.0 - 12.0 %    Eosinophil % 1.6 0.3 - 6.2 %    Basophil % 0.5 0.0 - 1.5 %    Immature Grans % 0.3 0.0 - 0.5 %    Neutrophils, Absolute 7.35 (H) 1.70 - 7.00 10*3/mm3    Lymphocytes, Absolute 2.77 0.70 - 3.10 10*3/mm3    Monocytes, Absolute 1.10 (H) 0.10 - 0.90 10*3/mm3    Eosinophils, Absolute 0.18 0.00 - 0.40 10*3/mm3    Basophils, Absolute 0.06 0.00 - 0.20 10*3/mm3    Immature Grans, Absolute 0.03 0.00 - 0.05 10*3/mm3    nRBC 0.0 0.0 - 0.2 /100 WBC   POC Glucose Once   Result Value Ref Range    Glucose 122 70 - 130 mg/dL   POC Glucose Once   Result Value Ref Range    Glucose 153 (H) 70 - 130 mg/dL   POC Activated Clotting Time   Result Value Ref Range    Activated Clotting Time  208 (H) 82 - 152 Seconds   Type & Screen   Result Value Ref Range    ABO Type A     RH type Positive     Antibody Screen Negative     T&S Expiration Date 2/24/2020 11:59:59 PM        The following portions of the patient's history were reviewed and updated as appropriate: allergies, current medications, past family history, past medical history, past social history, past surgical history and problem list.      he has a history of   Patient Active Problem List   Diagnosis   • Essential hypertension   • Anxiety   • Impaired vision   • Impaired fasting glucose   •  Blindness of both eyes   • Diverticulitis of intestine with perforation and abscess   • AAA (abdominal aortic aneurysm, ruptured) (CMS/[facility])   • Ruptured abdominal aortic aneurysm (AAA) (CMS/[facility])       Review of Systems   Constitutional: Negative for activity change, appetite change and unexpected weight change.   HENT: Positive for dental problem. Negative for sinus pressure, sinus pain and sore throat.    Eyes: Negative for visual disturbance.   Respiratory: Negative for chest tightness and shortness of breath.    Cardiovascular: Negative for chest pain and palpitations.   Gastrointestinal: Positive for abdominal distention. Negative for abdominal pain and vomiting.   Musculoskeletal: Negative for back pain and myalgias.   Skin: Negative for color change.   Neurological: Negative for dizziness, syncope and speech difficulty.   Psychiatric/Behavioral: Negative for confusion and decreased concentration. The patient is nervous/anxious.        Objective   Physical Exam   Constitutional: He is oriented to person, place, and time. He appears well-developed and well-nourished.   HENT:   Head: Atraumatic.   Mouth/Throat: Oropharynx is clear and moist. No oropharyngeal exudate.   Eyes: Pupils are equal, round, and reactive to light. EOM are normal.   Neck: Normal range of motion. Neck supple. No thyromegaly present.   Cardiovascular: Normal rate and regular rhythm.   Pulmonary/Chest: Effort normal and breath sounds normal.   Abdominal: Soft. Bowel sounds are normal. He exhibits distension. There is tenderness.   Surgery on abdomen from a ruptured abdominal aortic aneurysm   Musculoskeletal: Normal range of motion.   Neurological: He is alert and oriented to person, place, and time.   Skin: Skin is warm and dry. No rash noted. No erythema.   Psychiatric: He has a normal mood and affect. His behavior is normal.   Nursing note and vitals reviewed.      Assessment/Plan   There are no diagnoses linked to this encounter.

## 2020-03-23 ENCOUNTER — TRANSCRIBE ORDERS (OUTPATIENT)
Dept: ADMINISTRATIVE | Facility: HOSPITAL | Age: 65
End: 2020-03-23

## 2020-03-23 DIAGNOSIS — I71.40 ABDOMINAL AORTIC ANEURYSM (AAA) WITHOUT RUPTURE (HCC): Primary | ICD-10-CM

## 2020-07-09 ENCOUNTER — TELEPHONE (OUTPATIENT)
Dept: FAMILY MEDICINE CLINIC | Facility: CLINIC | Age: 65
End: 2020-07-09

## 2020-07-09 DIAGNOSIS — I10 ESSENTIAL HYPERTENSION: ICD-10-CM

## 2020-07-09 RX ORDER — LISINOPRIL 20 MG/1
20 TABLET ORAL DAILY
Qty: 90 TABLET | Refills: 1 | Status: SHIPPED | OUTPATIENT
Start: 2020-07-09 | End: 2020-07-14 | Stop reason: HOSPADM

## 2020-07-09 NOTE — TELEPHONE ENCOUNTER
Pt will finish radiation treatment for oral cancer next week.  Pt states that he is very weak and not able to walk into facility for treatments.    Pt is asking if you will prescribe med to help him with smoking cessation.  Pt also asking if you will refill lisinopril and pt will f/u with you in Sept.

## 2020-07-10 NOTE — TELEPHONE ENCOUNTER
1. He needs to see his Rad Onc doctor for his fatigue and weakness.  2. We can discuss smoking cessation (nicotine patch) at his 08/03/2020 visit.   3. I have refilled his Lisinopril 20mg daily 90 with a refill.

## 2020-07-11 ENCOUNTER — HOSPITAL ENCOUNTER (INPATIENT)
Facility: HOSPITAL | Age: 65
LOS: 2 days | Discharge: HOME | End: 2020-07-14
Attending: EMERGENCY MEDICINE | Admitting: HOSPITALIST

## 2020-07-11 DIAGNOSIS — I95.9 HYPOTENSION, UNSPECIFIED HYPOTENSION TYPE: Primary | ICD-10-CM

## 2020-07-11 DIAGNOSIS — N17.9 ACUTE RENAL FAILURE, UNSPECIFIED ACUTE RENAL FAILURE TYPE (HCC): ICD-10-CM

## 2020-07-11 DIAGNOSIS — E86.0 DEHYDRATION: ICD-10-CM

## 2020-07-11 LAB
ALBUMIN SERPL-MCNC: 4.2 G/DL (ref 3.5–5.2)
ALBUMIN/GLOB SERPL: 1.5 G/DL
ALP SERPL-CCNC: 52 U/L (ref 39–117)
ALT SERPL W P-5'-P-CCNC: 28 U/L (ref 1–41)
ANION GAP SERPL CALCULATED.3IONS-SCNC: 15.5 MMOL/L (ref 5–15)
AST SERPL-CCNC: 36 U/L (ref 1–40)
BASOPHILS # BLD AUTO: 0.04 10*3/MM3 (ref 0–0.2)
BASOPHILS NFR BLD AUTO: 0.6 % (ref 0–1.5)
BILIRUB SERPL-MCNC: 0.5 MG/DL (ref 0–1.2)
BUN SERPL-MCNC: 105 MG/DL (ref 8–23)
BUN/CREAT SERPL: 30.8 (ref 7–25)
CALCIUM SPEC-SCNC: 10.3 MG/DL (ref 8.6–10.5)
CHLORIDE SERPL-SCNC: 99 MMOL/L (ref 98–107)
CO2 SERPL-SCNC: 22.5 MMOL/L (ref 22–29)
CREAT SERPL-MCNC: 3.41 MG/DL (ref 0.76–1.27)
D-LACTATE SERPL-SCNC: 1.2 MMOL/L (ref 0.5–2)
DEPRECATED RDW RBC AUTO: 45.6 FL (ref 37–54)
EOSINOPHIL # BLD AUTO: 0.22 10*3/MM3 (ref 0–0.4)
EOSINOPHIL NFR BLD AUTO: 3.3 % (ref 0.3–6.2)
ERYTHROCYTE [DISTWIDTH] IN BLOOD BY AUTOMATED COUNT: 13.2 % (ref 12.3–15.4)
GFR SERPL CREATININE-BSD FRML MDRD: 18 ML/MIN/1.73
GLOBULIN UR ELPH-MCNC: 2.8 GM/DL
GLUCOSE SERPL-MCNC: 115 MG/DL (ref 65–99)
HCT VFR BLD AUTO: 34.1 % (ref 37.5–51)
HGB BLD-MCNC: 11.6 G/DL (ref 13–17.7)
IMM GRANULOCYTES # BLD AUTO: 0.02 10*3/MM3 (ref 0–0.05)
IMM GRANULOCYTES NFR BLD AUTO: 0.3 % (ref 0–0.5)
LYMPHOCYTES # BLD AUTO: 0.39 10*3/MM3 (ref 0.7–3.1)
LYMPHOCYTES NFR BLD AUTO: 5.8 % (ref 19.6–45.3)
MCH RBC QN AUTO: 32.2 PG (ref 26.6–33)
MCHC RBC AUTO-ENTMCNC: 34 G/DL (ref 31.5–35.7)
MCV RBC AUTO: 94.7 FL (ref 79–97)
MONOCYTES # BLD AUTO: 0.72 10*3/MM3 (ref 0.1–0.9)
MONOCYTES NFR BLD AUTO: 10.7 % (ref 5–12)
NEUTROPHILS NFR BLD AUTO: 5.34 10*3/MM3 (ref 1.7–7)
NEUTROPHILS NFR BLD AUTO: 79.3 % (ref 42.7–76)
NRBC BLD AUTO-RTO: 0 /100 WBC (ref 0–0.2)
PLATELET # BLD AUTO: 180 10*3/MM3 (ref 140–450)
PMV BLD AUTO: 9.7 FL (ref 6–12)
POTASSIUM SERPL-SCNC: 5.7 MMOL/L (ref 3.5–5.2)
PROCALCITONIN SERPL-MCNC: 0.31 NG/ML (ref 0–0.25)
PROT SERPL-MCNC: 7 G/DL (ref 6–8.5)
RBC # BLD AUTO: 3.6 10*6/MM3 (ref 4.14–5.8)
SODIUM SERPL-SCNC: 137 MMOL/L (ref 136–145)
TROPONIN T SERPL-MCNC: 0.07 NG/ML (ref 0–0.03)
WBC # BLD AUTO: 6.73 10*3/MM3 (ref 3.4–10.8)

## 2020-07-11 PROCEDURE — 36415 COLL VENOUS BLD VENIPUNCTURE: CPT

## 2020-07-11 PROCEDURE — 93005 ELECTROCARDIOGRAM TRACING: CPT | Performed by: EMERGENCY MEDICINE

## 2020-07-11 PROCEDURE — 87040 BLOOD CULTURE FOR BACTERIA: CPT | Performed by: EMERGENCY MEDICINE

## 2020-07-11 PROCEDURE — 80053 COMPREHEN METABOLIC PANEL: CPT | Performed by: EMERGENCY MEDICINE

## 2020-07-11 PROCEDURE — 83605 ASSAY OF LACTIC ACID: CPT | Performed by: EMERGENCY MEDICINE

## 2020-07-11 PROCEDURE — 85025 COMPLETE CBC W/AUTO DIFF WBC: CPT | Performed by: EMERGENCY MEDICINE

## 2020-07-11 PROCEDURE — 93010 ELECTROCARDIOGRAM REPORT: CPT | Performed by: INTERNAL MEDICINE

## 2020-07-11 PROCEDURE — G0378 HOSPITAL OBSERVATION PER HR: HCPCS

## 2020-07-11 PROCEDURE — 99284 EMERGENCY DEPT VISIT MOD MDM: CPT

## 2020-07-11 PROCEDURE — 84145 PROCALCITONIN (PCT): CPT | Performed by: EMERGENCY MEDICINE

## 2020-07-11 PROCEDURE — 84484 ASSAY OF TROPONIN QUANT: CPT | Performed by: EMERGENCY MEDICINE

## 2020-07-11 RX ORDER — PANTOPRAZOLE SODIUM 40 MG/1
40 TABLET, DELAYED RELEASE ORAL DAILY
COMMUNITY
End: 2020-08-03 | Stop reason: SDUPTHER

## 2020-07-11 RX ADMIN — SODIUM CHLORIDE 2000 ML: 9 INJECTION, SOLUTION INTRAVENOUS at 20:10

## 2020-07-11 RX ADMIN — SODIUM CHLORIDE 1000 ML: 9 INJECTION, SOLUTION INTRAVENOUS at 21:47

## 2020-07-11 NOTE — ED TRIAGE NOTES
Pt to ED from home with c/o nausea and weakness that started 2 days ago. Pt is cx pt and has feeding tube ad per wife has not been able to tolerate feedings or fluids. Pt thinks he is dehydrated. Cx doc recommended pt come for fluids.Pt masked upon arrival. This nurse wearing mask and goggles during entire encounter.

## 2020-07-12 ENCOUNTER — APPOINTMENT (OUTPATIENT)
Dept: GENERAL RADIOLOGY | Facility: HOSPITAL | Age: 65
End: 2020-07-12

## 2020-07-12 PROBLEM — N17.9 AKI (ACUTE KIDNEY INJURY): Status: ACTIVE | Noted: 2020-07-12

## 2020-07-12 PROBLEM — C80.1 CANCER: Status: ACTIVE | Noted: 2020-07-12

## 2020-07-12 PROBLEM — R79.89 ELEVATED TROPONIN: Status: ACTIVE | Noted: 2020-07-12

## 2020-07-12 PROBLEM — R77.8 ELEVATED TROPONIN: Status: ACTIVE | Noted: 2020-07-12

## 2020-07-12 PROBLEM — Z93.1 G TUBE FEEDINGS (HCC): Status: ACTIVE | Noted: 2020-07-12

## 2020-07-12 PROBLEM — E87.5 HYPERKALEMIA: Status: ACTIVE | Noted: 2020-07-12

## 2020-07-12 PROBLEM — R11.2 NAUSEA & VOMITING: Status: ACTIVE | Noted: 2020-07-12

## 2020-07-12 LAB
ANION GAP SERPL CALCULATED.3IONS-SCNC: 13 MMOL/L (ref 5–15)
BUN SERPL-MCNC: 95 MG/DL (ref 8–23)
BUN/CREAT SERPL: 40.4 (ref 7–25)
CALCIUM SPEC-SCNC: 9.3 MG/DL (ref 8.6–10.5)
CHLORIDE SERPL-SCNC: 106 MMOL/L (ref 98–107)
CO2 SERPL-SCNC: 21 MMOL/L (ref 22–29)
CORTIS SERPL-MCNC: 11.55 MCG/DL
CREAT SERPL-MCNC: 2.35 MG/DL (ref 0.76–1.27)
DEPRECATED RDW RBC AUTO: 46 FL (ref 37–54)
ERYTHROCYTE [DISTWIDTH] IN BLOOD BY AUTOMATED COUNT: 13.1 % (ref 12.3–15.4)
GFR SERPL CREATININE-BSD FRML MDRD: 28 ML/MIN/1.73
GLUCOSE SERPL-MCNC: 100 MG/DL (ref 65–99)
HCT VFR BLD AUTO: 29.5 % (ref 37.5–51)
HGB BLD-MCNC: 10 G/DL (ref 13–17.7)
MCH RBC QN AUTO: 32.7 PG (ref 26.6–33)
MCHC RBC AUTO-ENTMCNC: 33.9 G/DL (ref 31.5–35.7)
MCV RBC AUTO: 96.4 FL (ref 79–97)
PLATELET # BLD AUTO: 145 10*3/MM3 (ref 140–450)
PMV BLD AUTO: 9.8 FL (ref 6–12)
POTASSIUM SERPL-SCNC: 5.5 MMOL/L (ref 3.5–5.2)
RBC # BLD AUTO: 3.06 10*6/MM3 (ref 4.14–5.8)
SODIUM SERPL-SCNC: 140 MMOL/L (ref 136–145)
TROPONIN T SERPL-MCNC: 0.03 NG/ML (ref 0–0.03)
TROPONIN T SERPL-MCNC: 0.04 NG/ML (ref 0–0.03)
WBC # BLD AUTO: 5.52 10*3/MM3 (ref 3.4–10.8)

## 2020-07-12 PROCEDURE — 80048 BASIC METABOLIC PNL TOTAL CA: CPT | Performed by: NURSE PRACTITIONER

## 2020-07-12 PROCEDURE — 63710000001 PREDNISONE PER 1 MG: Performed by: HOSPITALIST

## 2020-07-12 PROCEDURE — 82533 TOTAL CORTISOL: CPT | Performed by: HOSPITALIST

## 2020-07-12 PROCEDURE — 74019 RADEX ABDOMEN 2 VIEWS: CPT

## 2020-07-12 PROCEDURE — 36415 COLL VENOUS BLD VENIPUNCTURE: CPT | Performed by: NURSE PRACTITIONER

## 2020-07-12 PROCEDURE — 84484 ASSAY OF TROPONIN QUANT: CPT | Performed by: NURSE PRACTITIONER

## 2020-07-12 PROCEDURE — 85027 COMPLETE CBC AUTOMATED: CPT | Performed by: NURSE PRACTITIONER

## 2020-07-12 RX ORDER — ONDANSETRON 2 MG/ML
4 INJECTION INTRAMUSCULAR; INTRAVENOUS EVERY 6 HOURS PRN
Status: DISCONTINUED | OUTPATIENT
Start: 2020-07-12 | End: 2020-07-12

## 2020-07-12 RX ORDER — ATORVASTATIN CALCIUM 20 MG/1
20 TABLET, FILM COATED ORAL EVERY EVENING
Status: DISCONTINUED | OUTPATIENT
Start: 2020-07-12 | End: 2020-07-14 | Stop reason: HOSPADM

## 2020-07-12 RX ORDER — ACETAMINOPHEN 325 MG/1
650 TABLET ORAL EVERY 4 HOURS PRN
Status: DISCONTINUED | OUTPATIENT
Start: 2020-07-12 | End: 2020-07-14 | Stop reason: HOSPADM

## 2020-07-12 RX ORDER — SODIUM CHLORIDE 0.9 % (FLUSH) 0.9 %
10 SYRINGE (ML) INJECTION AS NEEDED
Status: DISCONTINUED | OUTPATIENT
Start: 2020-07-12 | End: 2020-07-14 | Stop reason: HOSPADM

## 2020-07-12 RX ORDER — SODIUM CHLORIDE 9 MG/ML
125 INJECTION, SOLUTION INTRAVENOUS CONTINUOUS
Status: DISCONTINUED | OUTPATIENT
Start: 2020-07-12 | End: 2020-07-14

## 2020-07-12 RX ORDER — NITROGLYCERIN 0.4 MG/1
0.4 TABLET SUBLINGUAL
Status: DISCONTINUED | OUTPATIENT
Start: 2020-07-12 | End: 2020-07-14 | Stop reason: HOSPADM

## 2020-07-12 RX ORDER — OXYCODONE HYDROCHLORIDE 5 MG/1
10 TABLET ORAL EVERY 4 HOURS PRN
Status: DISCONTINUED | OUTPATIENT
Start: 2020-07-12 | End: 2020-07-14 | Stop reason: HOSPADM

## 2020-07-12 RX ORDER — ACETAMINOPHEN 650 MG/1
650 SUPPOSITORY RECTAL EVERY 4 HOURS PRN
Status: DISCONTINUED | OUTPATIENT
Start: 2020-07-12 | End: 2020-07-14 | Stop reason: HOSPADM

## 2020-07-12 RX ORDER — SODIUM CHLORIDE 0.9 % (FLUSH) 0.9 %
10 SYRINGE (ML) INJECTION EVERY 12 HOURS SCHEDULED
Status: DISCONTINUED | OUTPATIENT
Start: 2020-07-12 | End: 2020-07-14 | Stop reason: HOSPADM

## 2020-07-12 RX ORDER — ONDANSETRON 2 MG/ML
4 INJECTION INTRAMUSCULAR; INTRAVENOUS EVERY 4 HOURS PRN
Status: DISCONTINUED | OUTPATIENT
Start: 2020-07-12 | End: 2020-07-14 | Stop reason: HOSPADM

## 2020-07-12 RX ORDER — PREDNISONE 20 MG/1
20 TABLET ORAL 2 TIMES DAILY
Status: DISCONTINUED | OUTPATIENT
Start: 2020-07-12 | End: 2020-07-14

## 2020-07-12 RX ORDER — ACETAMINOPHEN 160 MG/5ML
650 SOLUTION ORAL EVERY 4 HOURS PRN
Status: DISCONTINUED | OUTPATIENT
Start: 2020-07-12 | End: 2020-07-14 | Stop reason: HOSPADM

## 2020-07-12 RX ORDER — PANTOPRAZOLE SODIUM 40 MG/1
40 TABLET, DELAYED RELEASE ORAL DAILY
Status: DISCONTINUED | OUTPATIENT
Start: 2020-07-12 | End: 2020-07-14 | Stop reason: HOSPADM

## 2020-07-12 RX ADMIN — SODIUM CHLORIDE, PRESERVATIVE FREE 10 ML: 5 INJECTION INTRAVENOUS at 01:02

## 2020-07-12 RX ADMIN — ATORVASTATIN CALCIUM 20 MG: 20 TABLET, FILM COATED ORAL at 16:46

## 2020-07-12 RX ADMIN — OXYCODONE 10 MG: 5 TABLET ORAL at 21:05

## 2020-07-12 RX ADMIN — SODIUM CHLORIDE 100 ML/HR: 9 INJECTION, SOLUTION INTRAVENOUS at 01:03

## 2020-07-12 RX ADMIN — OXYCODONE 10 MG: 5 TABLET ORAL at 12:20

## 2020-07-12 RX ADMIN — PANTOPRAZOLE SODIUM 40 MG: 40 TABLET, DELAYED RELEASE ORAL at 09:57

## 2020-07-12 RX ADMIN — PREDNISONE 20 MG: 20 TABLET ORAL at 21:05

## 2020-07-12 RX ADMIN — ACETAMINOPHEN ORAL SOLUTION 650 MG: 325 SOLUTION ORAL at 09:57

## 2020-07-12 RX ADMIN — SODIUM CHLORIDE, PRESERVATIVE FREE 10 ML: 5 INJECTION INTRAVENOUS at 12:21

## 2020-07-12 RX ADMIN — PREDNISONE 20 MG: 20 TABLET ORAL at 15:43

## 2020-07-12 RX ADMIN — OXYCODONE 10 MG: 5 TABLET ORAL at 16:46

## 2020-07-12 RX ADMIN — SODIUM CHLORIDE 125 ML/HR: 9 INJECTION, SOLUTION INTRAVENOUS at 20:20

## 2020-07-12 NOTE — ED NOTES
Pt to ED from home for weakness. Pt reports n/v x 2 days. Pt on radiation for cancer of tongue.     Pt is legally blind and [place].     This RN to room and BP 60s/40s. Izzy notified, 2 IVs placed, and 2L NS started.    This RN wearing gloves, mask, goggles. Pt and visitor wearing surgical masks.     Maria Del Rosario Noland RN  07/11/20 2039       Maria Del Rosario Noland RN  07/11/20 2056

## 2020-07-12 NOTE — H&P
Patient Name:  Darryn Baxter  YOB: 1955  MRN:  [id number]  Admit Date:  7/11/2020  Patient Care Team:  Darryn Nielsen MD as PCP - General (Family Medicine)  Darryn Nielsen MD as PCP - Claims Attributed      Chief Complaint   Patient presents with   • Weakness - Generalized   • Nausea       Subjective     Mr. Baxter is a 65 y.o. male with a history of oral cancer, HTN, legally blind, g tube that presents to  complaining of weakness. Patient reports that he is currently undergoing radiation for his oral cancer and that for the past 6 weeks, he has been feeding himself through g tube. For the past couple days has been unable to keep any of the feedings down even despite feeding himself less than what he is supposed to be taking. He states that he was on the commode today when he began to vomit again and states that he felt so weak that he may pass out, though he denies any syncopal events. Patient denies fever, chills, chest pain, shortness of breath, abdominal pain, changes in bowel or bladder habits, edema. Labs done tonight show MARYBETH and elevated troponin. His blood pressure dropped in ED but was given IV fluids with improvement.     History of Present Illness    Past Medical History:   Diagnosis Date   • Abscess     COLONIC   • Arthritis    • Cancer (CMS/HCC)    • Floaters     VISION BETTER IN RIGHT VS LEFT   • Gastric reflux     ON OCCASION   • History of cellulitis     LEFT ANKLE ABOUT 10 YRS AGO   • History of migraine     VERY LIGHT SENSITIVE   • Hypertension    • Lumbar herniated disc    • RLF (retrolental fibroplasia)     SINCE CHILDHOOD   • Vision impairment      Past Surgical History:   Procedure Laterality Date   • ANGIOPLASTY FEMORAL ARTERY N/A 2/21/2020    Procedure: ruptured triple aneurysm endovascular repair;  Surgeon: Kev Pérez MD;  Location: [facility] OR 18/19;  Service: Vascular;  Laterality: N/A;   • APPENDECTOMY     • ARTHROSCOPY  SHOULDER / OPEN SHOULDER Right     ROTATOR CUFF   • BACK SURGERY      LUMBAR   • CATARACT EXTRACTION      LEFT AND RIGHT   • CHOLECYSTECTOMY     • COLON RESECTION N/A 3/29/2018    Procedure: HAND ASSISTED LAPAROSCOPIC LOW ANTERIOR COLON RESECTION;  Surgeon: Oneil Hallman MD;  Location: [facility];  Service: General   • COLONOSCOPY  2015   • FRACTURE SURGERY Left     ELBOW   • TONSILLECTOMY     • UMBILICAL HERNIA REPAIR N/A 10/5/2016    Procedure: UMBILICAL HERNIA REPAIR with mesh;  Surgeon: Oneil Hallman MD;  Location: [facility];  Service:      Family History   Problem Relation Age of Onset   • Diabetes Mother    • Leukemia Father    • Drug abuse Maternal Uncle    • Stroke Maternal Uncle    • Drug abuse Paternal Uncle    • Stroke Paternal Uncle    • Malig Hyperthermia Neg Hx      Social History     Tobacco Use   • Smoking status: Current Every Day Smoker     Packs/day: 0.25     Years: 15.00     Pack years: 3.75     Types: Cigarettes   • Smokeless tobacco: Never Used   • Tobacco comment: 5 cigarettes   Substance Use Topics   • Alcohol use: No     Comment: FORMER   16 YRS SOBER   • Drug use: No     Medications Prior to Admission   Medication Sig Dispense Refill Last Dose   • atorvastatin (LIPITOR) 20 MG tablet Take 1 tablet by mouth Every Evening. 30 tablet 3 Taking   • lisinopril (PRINIVIL,ZESTRIL) 20 MG tablet Take 1 tablet by mouth Daily. 90 tablet 1    • OXYCODONE HCL PO Take  by mouth 4 (Four) Times a Day.      • pantoprazole (PROTONIX) 40 MG EC tablet Take 40 mg by mouth Daily.      • acetaminophen (TYLENOL) 325 MG tablet Take 650 mg by mouth Every 6 (Six) Hours As Needed for Mild Pain  (AS NEEDED).   Taking   • albuterol sulfate  (90 Base) MCG/ACT inhaler Inhale 2 puffs Every 4 (Four) Hours As Needed for Wheezing. 1 inhaler 0 Taking   • aspirin 325 MG tablet Take 325 mg by mouth As Needed for Mild Pain .   Taking   • aspirin 81 MG chewable tablet Chew 1 tablet Daily.   Taking   • calcium  polycarbophil (FIBERCON) 625 MG tablet Take 625 mg by mouth Daily.   Taking   • desoximetasone (TOPICORT) 0.25 % cream Apply  topically to the appropriate area as directed 2 (Two) Times a Day. 1 each 3 Taking   • ibuprofen (ADVIL,MOTRIN) 200 MG tablet Take 800 mg by mouth Every 6 (Six) Hours As Needed for Mild Pain . HELD FOR SURGERY   Taking   • predniSONE (DELTASONE) 20 MG tablet Take 1 tablet by mouth 2 (Two) Times a Day. 10 tablet 0 Taking   • vitamin C (ASCORBIC ACID) 500 MG tablet Take 1,000 mg by mouth Daily.   Taking     Allergies:    Allergies   Allergen Reactions   • Ether Anaphylaxis   • Latex Rash       Review of Systems   Constitutional: Negative for chills and fever.   HENT: Positive for sore throat. Negative for congestion.    Eyes: Negative.  Negative for visual disturbance.   Respiratory: Negative.  Negative for cough and shortness of breath.    Cardiovascular: Negative.  Negative for chest pain and leg swelling.   Gastrointestinal: Positive for nausea and vomiting. Negative for abdominal pain.   Endocrine: Negative.    Genitourinary: Negative.  Negative for dysuria, frequency and urgency.   Musculoskeletal: Negative.  Negative for arthralgias and myalgias.   Skin: Negative for color change and pallor.   Allergic/Immunologic: Negative.    Neurological: Positive for weakness and light-headedness.   Hematological: Negative.    Psychiatric/Behavioral: Negative.  Negative for agitation and behavioral problems.        Objective    Vital Signs  Temp:  [97.3 °F (36.3 °C)] 97.3 °F (36.3 °C)  Heart Rate:  [67-88] 76  Resp:  [16-18] 16  BP: ()/(41-67) 98/58  SpO2:  [93 %-100 %] 99 %  on   ;      Body mass index is 35.88 kg/m².    Physical Exam   Constitutional: He is oriented to person, place, and time. He appears well-developed. No distress.   HENT:   Head: Normocephalic and atraumatic.   Mouth/Throat: Mucous membranes are dry.   Eyes:   Horizontal nystagmus, legally blind   Neck: Normal range of  motion. Neck supple.   Cardiovascular: Normal rate, regular rhythm and intact distal pulses.   Pulmonary/Chest: Effort normal. No respiratory distress. He has rhonchi (few scattered rhonchi).   Abdominal: Soft. He exhibits distension. Bowel sounds are decreased.   g tube present   Musculoskeletal: Normal range of motion. He exhibits no edema or tenderness.   Neurological: He is alert and oriented to person, place, and time.   Skin: Skin is warm and dry. He is not diaphoretic. There is erythema (neck).   Psychiatric: He has a normal mood and affect. His behavior is normal. Thought content normal.   Nursing note and vitals reviewed.      Results Review:   I reviewed the patient's new clinical results including all labs and xrays.    Lab Results (last 24 hours)     Procedure Component Value Units Date/Time    CBC & Differential [[id number]] Collected:  07/11/20 2024    Specimen:  Blood Updated:  07/11/20 2043    Narrative:       The following orders were created for panel order CBC & Differential.  Procedure                               Abnormality         Status                     ---------                               -----------         ------                     CBC Auto Differential[[id number]]        Abnormal            Final result                 Please view results for these tests on the individual orders.    Comprehensive Metabolic Panel [[id number]]  (Abnormal) Collected:  07/11/20 2024    Specimen:  Blood Updated:  07/11/20 2102     Glucose 115 mg/dL       mg/dL      Creatinine 3.41 mg/dL      Sodium 137 mmol/L      Potassium 5.7 mmol/L      Chloride 99 mmol/L      CO2 22.5 mmol/L      Calcium 10.3 mg/dL      Total Protein 7.0 g/dL      Albumin 4.20 g/dL      ALT (SGPT) 28 U/L      AST (SGOT) 36 U/L      Alkaline Phosphatase 52 U/L      Total Bilirubin 0.5 mg/dL      eGFR Non African Amer 18 mL/min/1.73      Globulin 2.8 gm/dL      A/G Ratio 1.5 g/dL      BUN/Creatinine Ratio 30.8     Anion Gap 15.5  "mmol/L     Narrative:       GFR Normal >60  Chronic Kidney Disease <60  Kidney Failure <15      Lactic Acid, Plasma [[id number]]  (Normal) Collected:  07/11/20 2024    Specimen:  Blood Updated:  07/11/20 2055     Lactate 1.2 mmol/L     Procalcitonin [[id number]]  (Abnormal) Collected:  07/11/20 2024    Specimen:  Blood Updated:  07/11/20 2108     Procalcitonin 0.31 ng/mL     Narrative:       As a Marker for Sepsis (Non-Neonates):   1. <0.5 ng/mL represents a low risk of severe sepsis and/or septic shock.  1. >2 ng/mL represents a high risk of severe sepsis and/or septic shock.    As a Marker for Lower Respiratory Tract Infections that require antibiotic therapy:  PCT on Admission     Antibiotic Therapy             6-12 Hrs later  > 0.5                Strongly Recommended            >0.25 - <0.5         Recommended  0.1 - 0.25           Discouraged                   Remeasure/reassess PCT  <0.1                 Strongly Discouraged          Remeasure/reassess PCT      As 28 day mortality risk marker: \"Change in Procalcitonin Result\" (> 80 % or <=80 %) if Day 0 (or Day 1) and Day 4 values are available. Refer to http://www.[employer]-pct-calculator.com/   Change in PCT <=80 %   A decrease of PCT levels below or equal to 80 % defines a positive change in PCT test result representing a higher risk for 28-day all-cause mortality of patients diagnosed with severe sepsis or septic shock.  Change in PCT > 80 %   A decrease of PCT levels of more than 80 % defines a negative change in PCT result representing a lower risk for 28-day all-cause mortality of patients diagnosed with severe sepsis or septic shock.                Results may be falsely decreased if patient taking Biotin.     Troponin [[id number]]  (Abnormal) Collected:  07/11/20 2024    Specimen:  Blood Updated:  07/11/20 2106     Troponin T 0.066 ng/mL     Narrative:       Troponin T Reference Range:  <= 0.03 ng/mL-   Negative for AMI  >0.03 ng/mL-     Abnormal for " myocardial necrosis.  Clinicians would have to utilize clinical acumen, EKG, Troponin and serial changes to determine if it is an Acute Myocardial Infarction or myocardial injury due to an underlying chronic condition.       Results may be falsely decreased if patient taking Biotin.      CBC Auto Differential [[id number]]  (Abnormal) Collected:  07/11/20 2024    Specimen:  Blood Updated:  07/11/20 2043     WBC 6.73 10*3/mm3      RBC 3.60 10*6/mm3      Hemoglobin 11.6 g/dL      Hematocrit 34.1 %      MCV 94.7 fL      MCH 32.2 pg      MCHC 34.0 g/dL      RDW 13.2 %      RDW-SD 45.6 fl      MPV 9.7 fL      Platelets 180 10*3/mm3      Neutrophil % 79.3 %      Lymphocyte % 5.8 %      Monocyte % 10.7 %      Eosinophil % 3.3 %      Basophil % 0.6 %      Immature Grans % 0.3 %      Neutrophils, Absolute 5.34 10*3/mm3      Lymphocytes, Absolute 0.39 10*3/mm3      Monocytes, Absolute 0.72 10*3/mm3      Eosinophils, Absolute 0.22 10*3/mm3      Basophils, Absolute 0.04 10*3/mm3      Immature Grans, Absolute 0.02 10*3/mm3      nRBC 0.0 /100 WBC     Blood Culture - Blood, Arm, Right [[id number]] Collected:  07/11/20 2048    Specimen:  Blood from Arm, Right Updated:  07/11/20 2111    Blood Culture - Blood, Arm, Right [[id number]] Collected:  07/11/20 2205    Specimen:  Blood from Arm, Right Updated:  07/11/20 2211          No orders to display     Assessment/Plan      Active Hospital Problems    Diagnosis  POA   • **MARYBETH (acute kidney injury) (CMS/[facility]) [N17.9]  Yes   • Elevated troponin [R79.89]  Yes   • Hyperkalemia [E87.5]  Yes   • Hypotension [I95.9]  Yes   • Essential hypertension [I10]  Yes   • Blindness of both eyes [H54.3]  Yes      Resolved Hospital Problems   No resolved problems to display.     MARYBETH/hypotension  -creatinine 3.41 tonight, up from baseline of 1  -likely dehydration from vomiting causing significant increase in creatinine and hypotension  -improved with IVF, will continue  -avoid further nephrotoxins  -recheck  labs in AM  -antiemetics PRN  -will have nutrition consult for tube feeding assessment and may need GI to see as well but will await rounding MD opinion    Elevated troponin  -no chest pain, likely due to MARYBETH  -will trend    Hyperkalemia  -expect improvement with continued hydration, will recheck labs in AM    VTE Ppx  -SCDs    CODE status  -full    I discussed the patients findings and my recommendations with patient.    SAMANTHA Hays  [facility]  07/12/20  12:10 AM

## 2020-07-12 NOTE — ED PROVIDER NOTES
EMERGENCY DEPARTMENT ENCOUNTER    Room Number:  24/24  Date of encounter:  7/11/2020  PCP: Darryn Nielsen MD  Historian: Patient     I used full protective equipment while examining this patient.  This includes face mask, gloves and protective eyewear.  I washed my hands before entering the room and immediately upon leaving the room      HPI:  Chief Complaint: Generalized weakness  A complete HPI/ROS/PMH/PSH/SH/FH are unobtainable due to: None    Context: Darryn Baxter is a 65 y.o. male who presents to the ED c/o generalized weakness.  Symptoms began several days ago and have become gradually worse.  Symptoms are constant and worsened by nothing, improved by nothing.  Patient has had poor appetite and has not been able to tolerate tube feeds as per usual.  He denies any fever, chest pain or trouble breathing.  He does report some constipation but denies any GI bleeding or urinary symptoms.  Patient does have a history of oral cancer and is being treated with radiation therapy.  His oncologist is in [facility] and his name Dr. jimenez, phone #[phone].      PAST MEDICAL HISTORY  Active Ambulatory Problems     Diagnosis Date Noted   • Essential hypertension 01/12/2016   • Anxiety 01/12/2016   • Impaired vision 01/12/2016   • Impaired fasting glucose 01/12/2016   • Blindness of both eyes 09/24/2015   • Diverticulitis of intestine with perforation and abscess 01/26/2018   • AAA (abdominal aortic aneurysm, ruptured) (CMS/[facility]) 02/21/2020   • Ruptured abdominal aortic aneurysm (AAA) (CMS/[facility]) 02/21/2020   • Morbidly obese (CMS/[facility]) 03/16/2020     Resolved Ambulatory Problems     Diagnosis Date Noted   • Diverticulitis of large intestine with perforation and abscess without bleeding 03/07/2018     Past Medical History:   Diagnosis Date   • Abscess    • Arthritis    • Cancer (CMS/[facility])    • Floaters    • Gastric reflux    • History of cellulitis    • History of migraine    • Hypertension    • Lumbar herniated  disc    • RLF (retrolental fibroplasia)    • Vision impairment          PAST SURGICAL HISTORY  Past Surgical History:   Procedure Laterality Date   • ANGIOPLASTY FEMORAL ARTERY N/A 2/21/2020    Procedure: ruptured triple aneurysm endovascular repair;  Surgeon: Kev Pérez MD;  Location: [facility] OR 18/19;  Service: Vascular;  Laterality: N/A;   • APPENDECTOMY     • ARTHROSCOPY SHOULDER / OPEN SHOULDER Right     ROTATOR CUFF   • BACK SURGERY      LUMBAR   • CATARACT EXTRACTION      LEFT AND RIGHT   • CHOLECYSTECTOMY     • COLON RESECTION N/A 3/29/2018    Procedure: HAND ASSISTED LAPAROSCOPIC LOW ANTERIOR COLON RESECTION;  Surgeon: Oneil Hallman MD;  Location: [facility] MAIN OR;  Service: General   • COLONOSCOPY  2015   • FRACTURE SURGERY Left     ELBOW   • TONSILLECTOMY     • UMBILICAL HERNIA REPAIR N/A 10/5/2016    Procedure: UMBILICAL HERNIA REPAIR with mesh;  Surgeon: Oneil Hallman MD;  [place] [facility] [place] [facility]  Service:          FAMILY HISTORY  Family History   Problem Relation Age of Onset   • Diabetes Mother    • Leukemia Father    • Drug abuse Maternal Uncle    • Stroke Maternal Uncle    • Drug abuse Paternal Uncle    • Stroke Paternal Uncle    • Malig Hyperthermia Neg Hx          SOCIAL HISTORY  Social History     Socioeconomic History   • Marital status: Single     Spouse name: Not on file   • Number of children: Not on file   • Years of education: Not on file   • Highest education level: Not on file   Tobacco Use   • Smoking status: Current Every Day Smoker     Packs/day: 0.25     Years: 15.00     Pack years: 3.75     Types: Cigarettes   • Smokeless tobacco: Never Used   • Tobacco comment: 5 cigarettes   Substance and Sexual Activity   • Alcohol use: No     Comment: FORMER   16 YRS SOBER   • Drug use: No         ALLERGIES  Ether and Latex       REVIEW OF SYSTEMS  Review of Systems   Constitutional: Positive for fever. Negative for unexpected weight change.   HENT: Negative.  Negative for  sore throat.    Eyes:        Patient is blind   Respiratory: Negative.  Negative for cough.    Cardiovascular: Negative.  Negative for chest pain.   Gastrointestinal: Positive for constipation and vomiting (Twice in last 24 hours).   Genitourinary: Negative.  Negative for dysuria.   Musculoskeletal: Negative.  Negative for back pain.   Skin: Negative.  Negative for rash.   Neurological: Positive for weakness. Negative for headaches.   All other systems reviewed and are negative.          PHYSICAL EXAM    I have reviewed the triage vital signs and nursing notes.    ED Triage Vitals [07/11/20 1952]   Temp Heart Rate Resp BP SpO2   97.3 °F (36.3 °C) 88 18 -- 97 %      Temp src Heart Rate Source Patient Position BP Location FiO2 (%)   Tympanic Monitor -- -- --       Physical Exam  GENERAL: Alert and oriented x3, appears chronically ill  HENT: nares patent, oropharynx clear  EYES: no scleral icterus  CV: regular rhythm, regular rate  RESPIRATORY: normal effort, clear to auscultation bilaterally  ABDOMEN: soft, nontender to palpation-soft.  Feeding tube present in upper abdomen  MUSCULOSKELETAL: Mild chronic swelling is noted to bilateral lower extremities  NEURO: Mild generalized weakness is present without focal deficit.  Sensation, and coordination are grossly intact.  Speech and mentation are unremarkable  SKIN: warm, dry      LAB RESULTS  Recent Results (from the past 24 hour(s))   Comprehensive Metabolic Panel    Collection Time: 07/11/20  8:24 PM   Result Value Ref Range    Glucose 115 (H) 65 - 99 mg/dL     (H) 8 - 23 mg/dL    Creatinine 3.41 (H) 0.76 - 1.27 mg/dL    Sodium 137 136 - 145 mmol/L    Potassium 5.7 (H) 3.5 - 5.2 mmol/L    Chloride 99 98 - 107 mmol/L    CO2 22.5 22.0 - 29.0 mmol/L    Calcium 10.3 8.6 - 10.5 mg/dL    Total Protein 7.0 6.0 - 8.5 g/dL    Albumin 4.20 3.50 - 5.20 g/dL    ALT (SGPT) 28 1 - 41 U/L    AST (SGOT) 36 1 - 40 U/L    Alkaline Phosphatase 52 39 - 117 U/L    Total Bilirubin  0.5 0.0 - 1.2 mg/dL    eGFR Non African Amer 18 (L) >60 mL/min/1.73    Globulin 2.8 gm/dL    A/G Ratio 1.5 g/dL    BUN/Creatinine Ratio 30.8 (H) 7.0 - 25.0    Anion Gap 15.5 (H) 5.0 - 15.0 mmol/L   Lactic Acid, Plasma    Collection Time: 07/11/20  8:24 PM   Result Value Ref Range    Lactate 1.2 0.5 - 2.0 mmol/L   Procalcitonin    Collection Time: 07/11/20  8:24 PM   Result Value Ref Range    Procalcitonin 0.31 (H) 0.00 - 0.25 ng/mL   Troponin    Collection Time: 07/11/20  8:24 PM   Result Value Ref Range    Troponin T 0.066 (C) 0.000 - 0.030 ng/mL   CBC Auto Differential    Collection Time: 07/11/20  8:24 PM   Result Value Ref Range    WBC 6.73 3.40 - 10.80 10*3/mm3    RBC 3.60 (L) 4.14 - 5.80 10*6/mm3    Hemoglobin 11.6 (L) 13.0 - 17.7 g/dL    Hematocrit 34.1 (L) 37.5 - 51.0 %    MCV 94.7 79.0 - 97.0 fL    MCH 32.2 26.6 - 33.0 pg    MCHC 34.0 31.5 - 35.7 g/dL    RDW 13.2 12.3 - 15.4 %    RDW-SD 45.6 37.0 - 54.0 fl    MPV 9.7 6.0 - 12.0 fL    Platelets 180 140 - 450 10*3/mm3    Neutrophil % 79.3 (H) 42.7 - 76.0 %    Lymphocyte % 5.8 (L) 19.6 - 45.3 %    Monocyte % 10.7 5.0 - 12.0 %    Eosinophil % 3.3 0.3 - 6.2 %    Basophil % 0.6 0.0 - 1.5 %    Immature Grans % 0.3 0.0 - 0.5 %    Neutrophils, Absolute 5.34 1.70 - 7.00 10*3/mm3    Lymphocytes, Absolute 0.39 (L) 0.70 - 3.10 10*3/mm3    Monocytes, Absolute 0.72 0.10 - 0.90 10*3/mm3    Eosinophils, Absolute 0.22 0.00 - 0.40 10*3/mm3    Basophils, Absolute 0.04 0.00 - 0.20 10*3/mm3    Immature Grans, Absolute 0.02 0.00 - 0.05 10*3/mm3    nRBC 0.0 0.0 - 0.2 /100 WBC       Ordered the above labs and independently reviewed the results.      RADIOLOGY  No Radiology Exams Resulted Within Past 24 Hours    I ordered the above noted radiological studies. Reviewed by me and discussed with radiologist.  See dictation for official radiology interpretation.      PROCEDURES  Critical Care  Performed by: Tadeo Magana MD  Authorized by: Tadeo Magana MD     Critical care  provider statement:     Critical care time (minutes):  35    Critical care was necessary to treat or prevent imminent or life-threatening deterioration of the following conditions:  Circulatory failure    Critical care was time spent personally by me on the following activities:  Ordering and review of laboratory studies, ordering and review of radiographic studies, re-evaluation of patient's condition, examination of patient, evaluation of patient's response to treatment, obtaining history from patient or surrogate and review of old charts          MEDICATIONS GIVEN IN ER    Medications   sodium chloride 0.9 % bolus 2,000 mL (0 mL Intravenous Stopped 7/11/20 2142)   sodium chloride 0.9 % bolus 1,000 mL (1,000 mL Intravenous New Bag 7/11/20 2147)         PROGRESS, DATA ANALYSIS, CONSULTS, AND MEDICAL DECISION MAKING    All labs have been independently reviewed by me.  All radiology studies have been reviewed by me and discussed with radiologist dictating the report.   EKG's independently viewed and interpreted by me.  Discussion below represents my analysis of pertinent findings related to patient's condition, differential diagnosis, treatment plan and final disposition.      ED Course as of Jul 11 2318   Sat Jul 11, 2020 2006 I reviewed patient's prior medical records including admission for ruptured AAA in February 2020.  Also noted the patient seen     [DB]   2023 MDM-complex patient with multiple medical problems including oral cancer on radiation therapy.  He presents with generalized weakness and poor oral intake.  He is hypotensive at triage and in the ED.  I am worried for dehydration and will give a fluid bolus.  Etiology of symptoms is unclear at this time but would consider dehydration, infection as likely sources of his root problem.  Also patient does have a history of AAA rupture and would have to consider recurrent rupture of AAA in the differential diagnosis.  Although patient is having no  abdominal pain at this time.    [DB]   2111 Labs are back and reviewed and are notable for markedly elevated BUN and creatinine of 105 and 3.4 respectively.  Potassium was 5.7.  This is consistent with acute renal failure related to dehydration with hypotension.  Will admit to the ICU for IV fluids and further following.  Troponin of 0.066 is noted likely related to severe renal failure and hypotension and is not felt to represent acute coronary syndrome.    [DB]   2112 EKG          EKG time: 2047  Rhythm/Rate: Sinus 74  P waves and DE: Normal P waves and DE intervals  QRS, axis: Normal axis, IVCD  ST and T waves: Unremarkable ST and T waves    Interpreted Contemporaneously by me, independently viewed  Not significantly changed compared to prior 2/2020      [DB]   2135 I discussed patient's evaluation with Dr. Corbin from the ICU.  He asked that we watch him a little bit longer to see if his pressure comes up and if so that he could go to a telemetry bed and not ICU.  Most systolic blood pressures all been in the 80s other than the initial but the last pressure that I saw was 92 systolic so perhaps he will come up.  He has received all of 2 L and I will go ahead and give another third liter and will reassess blood pressure.  If it stays greater than 90 recurrently then I believe he would be fine for a telemetry bed outside of the ICU.    [DB]   2308 After 3 L of fluid patient's blood pressure is come up to 113/67 and I now believe he is safe to go to a telemetry bed rather than ICU.  Will contact [facility] about admission.    [DB]   2318 I spoke with Filomena from [facility] who will admit on behalf of Dr. Campo.    [DB]      ED Course User Index  [DB] Tadeo Magana MD       AS OF 23:18 VITALS:    BP - 113/67  HR - 68  TEMP - 97.3 °F (36.3 °C) (Tympanic)  O2 SATS - 94%      DIAGNOSIS  Final diagnoses:   Hypotension, unspecified hypotension type   Dehydration   Acute renal failure, unspecified acute renal failure type  (CMS/[facility])         DISPOSITION  Admission         Tadeo Magana MD  07/11/20 2330

## 2020-07-12 NOTE — CONSULTS
"Adult Nutrition  Assessment/PES    Patient Name:  Darryn Baxter  YOB: 1955  MRN: [id number]  Admit Date:  7/11/2020    Assessment Date:  7/12/2020    Comments:  Consult: TF assessment. MST 4. Wt index notes 250# on 2/23 w/CBW of 194#. Pt has been receiving raditation x 6w for oral cancer & has had part of tongue removed. Has PEG for nutrition but has been having emesis w/boluses for past few days. Currently NPO. Recommend when cleared to restart TF per GI,  IsoHN @25ml/hr, goal rate of 75ml/hr, with 30ml/hr water flushes. Nutrition to follow closely for tolerance.     Reason for Assessment     Row Name 07/12/20 1212          Reason for Assessment    Reason For Assessment  physician consult;TF/PN     Diagnosis  cancer diagnosis/related complications Emesis w/bolus feeds, MARYBETH, hyperkalemia, hypotension, Oral cancer w/current radiation. legally blind, & HTN.     Identified At Risk by Screening Criteria  MST SCORE 2+;unintentional loss of 10 lbs or more in the past 2 mos;tube feeding or parenteral nutrition;difficulty chewing/swallowing         Nutrition/Diet History     Row Name 07/12/20 1213          Nutrition/Diet History    Typical Food/Fluid Intake  MST 4. Wt index notes 250# on 2/23 w/CBW of 194#. Pt has been receiving raditation x 6w for oral cancer & has had part of tongue removed. Has PEG for nutrition.      Factors Affecting Nutritional Intake  difficulty/impaired swallowing;vomiting         Anthropometrics     Row Name 07/12/20 1218 07/12/20 0555       Anthropometrics    Height  175.3 cm (69\")  --    Weight  --  88.4 kg (194 lb 12.8 oz)       Ideal Body Weight (IBW)    Ideal Body Weight (IBW) (kg)  73.69  --        Labs/Tests/Procedures/Meds     Row Name 07/12/20 1216          Labs/Procedures/Meds    Lab Results Reviewed  reviewed     Lab Results Comments  Glu 100, K 5.5, CO2 21, BUN 95, Cr 2.35, GFR 28, Hgb 10/Hct 29.5        Diagnostic Tests/Procedures    Diagnostic Test/Procedure Reviewed  " "reviewed        Medications    Pertinent Medications Reviewed  reviewed, pertinent     Pertinent Medications Comments  protonix, prednisone, 100ml/hr IVF         Physical Findings     Row Name 07/12/20 1217          Physical Findings    Gastrointestinal  nausea;feeding tube     Tubes  PEG     Oral/Mouth Cavity  edentulous;other (see comments) partial tongue removed, excessive saliva     Skin  other (see comments) B=20         Estimated/Assessed Needs     Row Name 07/12/20 1218          Calculation Measurements    Weight Used For Calculations  88.4 kg (194 lb 14.2 oz)     Height  175.3 cm (69\")        Estimated/Assessed Needs    Additional Documentation  Fluid Requirements (Group);Protein Requirements (Group);KCAL/KG (Group)        KCAL/KG    KCAL/KG  25 Kcal/Kg (kcal);30 Kcal/Kg (kcal);20 Kcal/Kg (kcal)     20 Kcal/Kg (kcal)  1768     25 Kcal/Kg (kcal)  2210     30 Kcal/Kg (kcal)  2652        Protein Requirements    Weight Used For Protein Calculations  88.4 kg (194 lb 14.2 oz)     Est Protein Requirement Amount (gms/kg)  1.3 gm protein     Estimated Protein Requirements (gms/day)  114.92        Fluid Requirements    Estimated Fluid Requirements (mL/day)  2210     Estimated Fluid Requirement Method  RDA Method     RDA Method (mL)  2210         Nutrition Prescription Ordered     Row Name 07/12/20 1218          Nutrition Prescription PO    Current PO Diet  NPO;Sips/ice chips        Nutrition Prescription EN    Enteral Route  PEG         Problem/Interventions:  Problem 1     Row Name 07/12/20 1218          Nutrition Diagnoses Problem 1    Problem 1  Unintended Weight Loss     Etiology (related to)  Medical Diagnosis     Oncology  Head/neck cancer     Signs/Symptoms (evidenced by)  Unintended Weight Change;NPO     Unintended Weight Change  Loss     Number of Pounds Lost  56#     Weight loss time period  4-6 months           Intervention Goal     Row Name 07/12/20 1219          Intervention Goal    General  Maintain " nutrition;Nutrition support treatment;Improved nutrition related lab(s);Reduce/improve symptoms;Meet nutritional needs for age/condition;Disease management/therapy     TF/PN  Inititiate TF/PN;Tolerate TF at goal;Deliver estimated need (%)     Deliver % of Estimated Need  100 %     Weight  Maintain weight         Nutrition Intervention     Row Name 07/12/20 1220          Nutrition Intervention    RD/Tech Action  Care plan reviewd;Follow Tx progress;Recommend/ordered     Recommended/Ordered  EN         Nutrition Prescription     Row Name 07/12/20 1220          Nutrition Prescription EN    Enteral Prescription  Enteral begin/change     Enteral Route  PEG     Product  Isosource HN     TF Delivery Method  Continuous     Continuous TF Goal Rate (mL/hr)  75 mL/hr     Continuous TF Starting Rate (mL/hr)  25 mL/hr     Water flush (mL)   30 mL     Water Flush Frequency  Per hour     New EN Prescription Ordered?  No, recommended         Education/Evaluation     Row Name 07/12/20 1222          Education    Education  Will Instruct as appropriate        Monitor/Evaluation    Monitor  Per protocol;Pertinent labs;TF delivery/tolerance;Weight;Skin status;Symptoms     Education Follow-up  Reinforce PRN           Electronically signed by:  Anuja Roblero MS,RD,LD  07/12/20 12:23

## 2020-07-12 NOTE — PLAN OF CARE
C/o throat pain, admin Roxicodone Q4, PEG tube clamped pt states tube placed 6 weeks ago at hospital in [place], no n/v today, NPO sips water with meds      Problem: Patient Care Overview  Goal: Plan of Care Review  Outcome: Ongoing (interventions implemented as appropriate)  Goal: Individualization and Mutuality  Outcome: Ongoing (interventions implemented as appropriate)  Goal: Discharge Needs Assessment  Outcome: Ongoing (interventions implemented as appropriate)  Goal: Interprofessional Rounds/Family Conf  Outcome: Ongoing (interventions implemented as appropriate)     Problem: Pain, Chronic (Adult)  Goal: Acceptable Pain/Comfort Level and Functional Ability  Outcome: Ongoing (interventions implemented as appropriate)     Problem: Nutrition, Enteral (Adult)  Goal: Signs and Symptoms of Listed Potential Problems Will be Absent, Minimized or Managed (Nutrition, Enteral)  Outcome: Ongoing (interventions implemented as appropriate)     Problem: Fluid Volume Deficit (Adult)  Goal: Optimal Fluid Balance  Outcome: Ongoing (interventions implemented as appropriate)

## 2020-07-12 NOTE — PLAN OF CARE
Problem: Patient Care Overview  Goal: Plan of Care Review  Outcome: Ongoing (interventions implemented as appropriate)  Flowsheets  Taken 7/12/2020 0323  Progress: improving  Outcome Summary: admitted to floor at 0030 from ED with hypotension which has improved after 3L bolus in ED; AAOx3, gait steady, amb with assist x1 due to legally blind; IVF's started; troponin levels elevated though improving r/t renal fxn; dehydration r/t chronic NPO status and inability to tolerate boost cartons as prescribed 4-6 times daily; reports barely tolerating 3 cartons/day for last few days; denies flushing with free water often; reports taking oral meds at home with sips of water and sitting upright 90degrees though reports coughing & some difficulty; inquired of reason not utilizing peg tube for meds, pt appears to have knowledge deficit after discussion and agreeable to have meds crushed and given via peg tube; pt with excess saliva & frequently spits in kleenexs; oral yaunker suction set up at bs; throat w/visible white patches;tube mckoy with clamp in place to left of peg tube which appears very worn, pt refused to remove due to no replacement of device available-unable to assess skin underneath device; nutrition consulted; urinal at bs. NSR w/BBB & elevated ST segment.  Taken 7/12/2020 0045  Plan of Care Reviewed With: patient     Problem: Pain, Chronic (Adult)  Goal: Acceptable Pain/Comfort Level and Functional Ability  Outcome: Ongoing (interventions implemented as appropriate)  Flowsheets (Taken 7/12/2020 0323)  Acceptable Pain/Comfort Level and Functional Ability: making progress toward outcome     Problem: Nutrition, Enteral (Adult)  Goal: Signs and Symptoms of Listed Potential Problems Will be Absent, Minimized or Managed (Nutrition, Enteral)  Outcome: Ongoing (interventions implemented as appropriate)  Flowsheets (Taken 7/12/2020 0323)  Problems Assessed (Enteral Nutrition): fluid/electrolyte imbalance;  gastrointestinal complications; malnutrition; skin/mucosal integrity impairment; aspiration  Problems Present (Enteral Nutrition): aspiration; skin/mucosal integrity impairment; fluid/electrolyte imbalance; gastrointestinal complications; malnutrition     Problem: Fluid Volume Deficit (Adult)  Goal: Optimal Fluid Balance  Outcome: Ongoing (interventions implemented as appropriate)  Flowsheets (Taken 7/12/2020 0083)  Optimal Fluid Balance: making progress toward outcome

## 2020-07-13 LAB
ALBUMIN SERPL-MCNC: 3.6 G/DL (ref 3.5–5.2)
ANION GAP SERPL CALCULATED.3IONS-SCNC: 10.7 MMOL/L (ref 5–15)
ANION GAP SERPL CALCULATED.3IONS-SCNC: 11.6 MMOL/L (ref 5–15)
BASOPHILS # BLD AUTO: 0 10*3/MM3 (ref 0–0.2)
BASOPHILS NFR BLD AUTO: 0 % (ref 0–1.5)
BUN SERPL-MCNC: 41 MG/DL (ref 8–23)
BUN SERPL-MCNC: 50 MG/DL (ref 8–23)
BUN/CREAT SERPL: 44.6 (ref 7–25)
BUN/CREAT SERPL: 45.6 (ref 7–25)
CALCIUM SPEC-SCNC: 8.6 MG/DL (ref 8.6–10.5)
CALCIUM SPEC-SCNC: 9.4 MG/DL (ref 8.6–10.5)
CHLORIDE SERPL-SCNC: 110 MMOL/L (ref 98–107)
CHLORIDE SERPL-SCNC: 112 MMOL/L (ref 98–107)
CO2 SERPL-SCNC: 19.4 MMOL/L (ref 22–29)
CO2 SERPL-SCNC: 21.3 MMOL/L (ref 22–29)
CREAT SERPL-MCNC: 0.9 MG/DL (ref 0.76–1.27)
CREAT SERPL-MCNC: 1.12 MG/DL (ref 0.76–1.27)
DEPRECATED RDW RBC AUTO: 47.1 FL (ref 37–54)
EOSINOPHIL # BLD AUTO: 0.01 10*3/MM3 (ref 0–0.4)
EOSINOPHIL NFR BLD AUTO: 0.2 % (ref 0.3–6.2)
ERYTHROCYTE [DISTWIDTH] IN BLOOD BY AUTOMATED COUNT: 13.2 % (ref 12.3–15.4)
GFR SERPL CREATININE-BSD FRML MDRD: 65 ML/MIN/1.73
GFR SERPL CREATININE-BSD FRML MDRD: 84 ML/MIN/1.73
GLUCOSE SERPL-MCNC: 105 MG/DL (ref 65–99)
GLUCOSE SERPL-MCNC: 95 MG/DL (ref 65–99)
HCT VFR BLD AUTO: 29.5 % (ref 37.5–51)
HGB BLD-MCNC: 10 G/DL (ref 13–17.7)
IMM GRANULOCYTES # BLD AUTO: 0.02 10*3/MM3 (ref 0–0.05)
IMM GRANULOCYTES NFR BLD AUTO: 0.4 % (ref 0–0.5)
LYMPHOCYTES # BLD AUTO: 0.35 10*3/MM3 (ref 0.7–3.1)
LYMPHOCYTES NFR BLD AUTO: 7.6 % (ref 19.6–45.3)
MCH RBC QN AUTO: 32.9 PG (ref 26.6–33)
MCHC RBC AUTO-ENTMCNC: 33.9 G/DL (ref 31.5–35.7)
MCV RBC AUTO: 97 FL (ref 79–97)
MONOCYTES # BLD AUTO: 0.27 10*3/MM3 (ref 0.1–0.9)
MONOCYTES NFR BLD AUTO: 5.8 % (ref 5–12)
NEUTROPHILS NFR BLD AUTO: 3.98 10*3/MM3 (ref 1.7–7)
NEUTROPHILS NFR BLD AUTO: 86 % (ref 42.7–76)
NRBC BLD AUTO-RTO: 0 /100 WBC (ref 0–0.2)
PHOSPHATE SERPL-MCNC: 4.2 MG/DL (ref 2.5–4.5)
PLATELET # BLD AUTO: 152 10*3/MM3 (ref 140–450)
PMV BLD AUTO: 10 FL (ref 6–12)
POTASSIUM SERPL-SCNC: 4.7 MMOL/L (ref 3.5–5.2)
POTASSIUM SERPL-SCNC: 5.7 MMOL/L (ref 3.5–5.2)
PROCALCITONIN SERPL-MCNC: 0.12 NG/ML (ref 0–0.25)
RBC # BLD AUTO: 3.04 10*6/MM3 (ref 4.14–5.8)
SODIUM SERPL-SCNC: 142 MMOL/L (ref 136–145)
SODIUM SERPL-SCNC: 143 MMOL/L (ref 136–145)
WBC # BLD AUTO: 4.63 10*3/MM3 (ref 3.4–10.8)

## 2020-07-13 PROCEDURE — 80048 BASIC METABOLIC PNL TOTAL CA: CPT | Performed by: HOSPITALIST

## 2020-07-13 PROCEDURE — 80069 RENAL FUNCTION PANEL: CPT | Performed by: HOSPITALIST

## 2020-07-13 PROCEDURE — 85025 COMPLETE CBC W/AUTO DIFF WBC: CPT | Performed by: HOSPITALIST

## 2020-07-13 PROCEDURE — 92610 EVALUATE SWALLOWING FUNCTION: CPT | Performed by: SPEECH-LANGUAGE PATHOLOGIST

## 2020-07-13 PROCEDURE — 84145 PROCALCITONIN (PCT): CPT | Performed by: HOSPITALIST

## 2020-07-13 PROCEDURE — 63710000001 PREDNISONE PER 1 MG: Performed by: HOSPITALIST

## 2020-07-13 RX ADMIN — PANTOPRAZOLE SODIUM 40 MG: 40 TABLET, DELAYED RELEASE ORAL at 09:35

## 2020-07-13 RX ADMIN — SODIUM CHLORIDE 125 ML/HR: 9 INJECTION, SOLUTION INTRAVENOUS at 05:17

## 2020-07-13 RX ADMIN — PREDNISONE 20 MG: 20 TABLET ORAL at 20:19

## 2020-07-13 RX ADMIN — NYSTATIN 500000 UNITS: 100000 SUSPENSION ORAL at 23:53

## 2020-07-13 RX ADMIN — PREDNISONE 20 MG: 20 TABLET ORAL at 09:35

## 2020-07-13 RX ADMIN — SODIUM CHLORIDE, PRESERVATIVE FREE 10 ML: 5 INJECTION INTRAVENOUS at 20:19

## 2020-07-13 RX ADMIN — OXYCODONE 10 MG: 5 TABLET ORAL at 09:35

## 2020-07-13 RX ADMIN — OXYCODONE 10 MG: 5 TABLET ORAL at 13:52

## 2020-07-13 RX ADMIN — OXYCODONE 10 MG: 5 TABLET ORAL at 18:18

## 2020-07-13 RX ADMIN — OXYCODONE 10 MG: 5 TABLET ORAL at 05:18

## 2020-07-13 RX ADMIN — OXYCODONE 10 MG: 5 TABLET ORAL at 01:17

## 2020-07-13 RX ADMIN — SODIUM CHLORIDE 125 ML/HR: 9 INJECTION, SOLUTION INTRAVENOUS at 14:30

## 2020-07-13 RX ADMIN — OXYCODONE 10 MG: 5 TABLET ORAL at 22:24

## 2020-07-13 RX ADMIN — ATORVASTATIN CALCIUM 20 MG: 20 TABLET, FILM COATED ORAL at 18:18

## 2020-07-13 NOTE — PROGRESS NOTES
Discharge Planning Assessment  [facility]     Patient Name: Darryn Baxter  MRN: [id number]  Today's Date: 7/13/2020    Admit Date: 7/11/2020    Discharge Needs Assessment     Row Name 07/13/20 0645       Living Environment    Lives With  significant other;spouse    Name(s) of Who Lives With Patient  Terra Hernandez [phone]    Current Living Arrangements  home/apartment/condo    Primary Care Provided by  self    Provides Primary Care For  no one    Family Caregiver if Needed  spouse    Family Caregiver Names  Terra Hernandez     Quality of Family Relationships  helpful;involved;supportive       Resource/Environmental Concerns    Resource/Environmental Concerns  none    Transportation Concerns  car, none       Transition Planning    Patient/Family Anticipates Transition to  home with family    Patient/Family Anticipated Services at Transition  none    Transportation Anticipated  family or friend will provide       Discharge Needs Assessment    Readmission Within the Last 30 Days  no previous admission in last 30 days    Concerns to be Addressed  no discharge needs identified    Equipment Currently Used at Home  other (see comments);none collapsible cane    Anticipated Changes Related to Illness  none        Discharge Plan     Row Name 07/13/20 1649       Plan    Plan  home with wife and [employer] to provide tube feeds    Patient/Family in Agreement with Plan  yes    Plan Comments  Spoke with patient at bedside.  Facesheet, PCP and pharmacy verified.  Patient lives in a 1 1/2 story house with his iwfe, Terra Salazar ( [phone]).  There is 1 step to enter the house and his bedroom and bathroom are on the 1st floor.  He is IADLS except his wife drives because he is legally blind.  He uses a collapsible cane.  He goes to [facility] ( [phone]) and sees Dr De La Torre.  He stated that this was scheduled to be his last week of treatments.  He gets his nutrition thorugh a PEG tube and states that his tube feeds  are provided by [facility].  Discussed HH, but patient states that he is feeling much better and does not think it will be needed.  Patient denies any needs and plans to return home.  CCP will follow. Juliann Ohara RN        Destination      Coordination has not been started for this encounter.      Durable Medical Equipment      Coordination has not been started for this encounter.      Dialysis/Infusion      Service Provider Request Status Selected Services Address Phone Number Fax Number    [facility] Pending - Request Sent N/A [id number] [place] [id number] [phone]      Home Medical Care      Coordination has not been started for this encounter.      Therapy      Coordination has not been started for this encounter.      Community Resources      Coordination has not been started for this encounter.          Demographic Summary     Row Name 07/13/20 1442       General Information    Admission Type  inpatient    Arrived From  home    Required Notices Provided  Important Message from Medicare    Referral Source  admission list    Reason for Consult  discharge planning    Preferred Language  English        Functional Status     Row Name 07/13/20 1442       Functional Status    Usual Activity Tolerance  good    Current Activity Tolerance  moderate       Functional Status, IADL    Medications  independent    Meal Preparation  independent    Housekeeping  independent    Laundry  independent    Shopping  independent       Mental Status    General Appearance WDL  WDL       Mental Status Summary    Recent Changes in Mental Status/Cognitive Functioning  no changes        Psychosocial    No documentation.       Abuse/Neglect    No documentation.       Legal    No documentation.       Substance Abuse    No documentation.       Patient Forms    No documentation.           Juliann Ohara RN

## 2020-07-13 NOTE — PLAN OF CARE
Patient  resting. Alert  and  oriented  x  3.   IV  fluids  ongoing.  Patient  NPO  except  ice  chips  and  sips  with  meds.  Medicated  every  4  hours  for  oral  and throat  pain.  Meds   given  via  G  tube.     SR   with  elevated  T  wave on  monitor.   Nursing  will  continue  to monitor.  Problem: Patient Care Overview  Goal: Plan of Care Review  Outcome: Ongoing (interventions implemented as appropriate)  Flowsheets (Taken 7/13/2020 0339)  Progress: improving  Plan of Care Reviewed With: patient     Problem: Pain, Chronic (Adult)  Goal: Acceptable Pain/Comfort Level and Functional Ability  Outcome: Ongoing (interventions implemented as appropriate)  Flowsheets (Taken 7/13/2020 0339)  Acceptable Pain/Comfort Level and Functional Ability: making progress toward outcome     Problem: Nutrition, Enteral (Adult)  Goal: Signs and Symptoms of Listed Potential Problems Will be Absent, Minimized or Managed (Nutrition, Enteral)  Outcome: Ongoing (interventions implemented as appropriate)  Flowsheets (Taken 7/13/2020 0339)  Problems Assessed (Enteral Nutrition): all  Problems Present (Enteral Nutrition): fluid/electrolyte imbalance; skin/mucosal integrity impairment     Problem: Fluid Volume Deficit (Adult)  Goal: Optimal Fluid Balance  Outcome: Ongoing (interventions implemented as appropriate)  Flowsheets (Taken 7/13/2020 0339)  Optimal Fluid Balance: making progress toward outcome     Problem: Renal Failure/Kidney Injury, Acute (Adult)  Goal: Signs and Symptoms of Listed Potential Problems Will be Absent, Minimized or Managed (Renal Failure/Kidney Injury, Acute)  Outcome: Ongoing (interventions implemented as appropriate)  Flowsheets (Taken 7/13/2020 0339)  Problems Assessed (Acute Renal Failure/Kidney Injury): all  Problems Present (ARF/Kidney Injury): electrolyte imbalance; fluid imbalance

## 2020-07-13 NOTE — PROGRESS NOTES
"DAILY PROGRESS NOTE  [facility]    Patient Identification:  Name: Darryn Baxter  Age: 65 y.o.  Sex: male  :  1955  MRN: [id number]         Primary Care Physician: Darryn Nielsen MD      Subjective  No complaints this afternoon.  No further vomiting.  No abdominal bloating or pain.    Objective:  General Appearance:  Comfortable, well-appearing, in no acute distress and not in pain.    Vital signs: (most recent): Blood pressure 113/67, pulse 60, temperature 97.3 °F (36.3 °C), temperature source Tympanic, resp. rate 18, height 175.3 cm (69\"), weight 88.3 kg (194 lb 9.6 oz), SpO2 100 %.    Lungs:  Normal effort and normal respiratory rate.  Breath sounds clear to auscultation.    Heart: Normal rate.  Regular rhythm.    Abdomen: Abdomen is soft and non-distended.  Bowel sounds are normal.   There is no abdominal tenderness.     Extremities: There is no dependent edema.    Neurological: Patient is alert and oriented to person, place and time.    Skin:  Warm and dry.                Vital signs in last 24 hours:  Temp:  [97.3 °F (36.3 °C)-98.6 °F (37 °C)] 97.3 °F (36.3 °C)  Heart Rate:  [60-96] 60  Resp:  [16-18] 18  BP: ()/(51-67) 113/67    Intake/Output:    Intake/Output Summary (Last 24 hours) at 2020 1509  Last data filed at 2020 0823  Gross per 24 hour   Intake 120 ml   Output 1050 ml   Net -930 ml         Results from last 7 days   Lab Units 20  0650 20   WBC 10*3/mm3 4.63 5.52 6.73   HEMOGLOBIN g/dL 10.0* 10.0* 11.6*   PLATELETS 10*3/mm3 152 145 180     Results from last 7 days   Lab Units 20  0650 20  0220 20   SODIUM mmol/L 142 140 137   POTASSIUM mmol/L 5.7* 5.5* 5.7*   CHLORIDE mmol/L 110* 106 99   CO2 mmol/L 21.3* 21.0* 22.5   BUN mg/dL 50* 95* 105*   CREATININE mg/dL 1.12 2.35* 3.41*   GLUCOSE mg/dL 105* 100* 115*   Estimated Creatinine Clearance: 70.3 mL/min (by C-G formula based on SCr of 1.12 mg/dL).  Results " from last 7 days   Lab Units 07/13/20  0650 07/12/20  0220 07/11/20 2024   CALCIUM mg/dL 9.4 9.3 10.3   ALBUMIN g/dL 3.60  --  4.20   PHOSPHORUS mg/dL 4.2  --   --      Results from last 7 days   Lab Units 07/13/20  0650 07/11/20 2024   ALBUMIN g/dL 3.60 4.20   BILIRUBIN mg/dL  --  0.5   ALK PHOS U/L  --  52   AST (SGOT) U/L  --  36   ALT (SGPT) U/L  --  28       Assessment:    MARYBETH (acute kidney injury): Improved hydration.  Continue IV fluids at a lower rate.    Nausea & vomiting: Presently resolved.  Will attempt to reinitiate tube feedings.    Hyperkalemia: Recheck BMP.  Keep ACE inhibitor is on hold.    Hypotension: Secondary dehydration.  Resolved.    Hypertension    Blindness of both eyes    Elevated troponin    Cancer (CMS/HCC)    G tube feedings: Please see above.        Plan:  Discussed with patient and his wife and RN.  Unable to get the tube feeding orders through [facility] due to heart stops.  Will request nutrition put orders in.  Over 30 minutes spent with over one half in counseling and medical management.    Brenden Burnette MD  7/13/2020  15:09

## 2020-07-13 NOTE — PLAN OF CARE
Problem: Patient Care Overview  Goal: Plan of Care Review  Outcome: Ongoing (interventions implemented as appropriate)  Flowsheets (Taken 7/13/2020 1142)  Progress: no change  Plan of Care Reviewed With: patient  Note:   Swallow evaluation completed. Recommend NPO with all nutrition and meds to be given via PEG tube. Patient may sip on thin water when seated in an upright position and he needs to take small sips. No straws. Patient should stop drinking if any s/s aspiration noted. Patient will need a VFSS after he completes his radiation and prior to initiating PO intake. Patient was not interested at this time in anything other than sips of water. SLP to follow for tolerance of sips of water only.

## 2020-07-13 NOTE — PROGRESS NOTES
Malnutrition Severity Assessment    Patient Name:  Darryn Baxter  YOB: 1955  MRN: [id number]  Admit Date:  7/11/2020    Patient meets criteria for : Severe Malnutrition    Comments:  56# (22.4%) weight loss x 5 months, decreased kcal/protein intake (reports he has never tolerated his goal TF of 4-6 cartons of Boost per day).    Limited nutrition focused physical exam completed d/t COVID.  Detailed in chart below.    Malnutrition Severity Assessment  Malnutrition Type: Chronic Disease - Related Malnutrition     Malnutrition Type (last 8 hours)      Malnutrition Severity Assessment     Row Name 07/13/20 1609       Malnutrition Severity Assessment    Malnutrition Type  Chronic Disease - Related Malnutrition    Row Name 07/13/20 1609       Insufficient Energy Intake     Insufficient Energy Intake Findings  Severe    Insufficient Energy Intake   <50% of est. energy requirement for >or equal to 1 month    Row Name 07/13/20 1609       Unintentional Weight Loss     Unintentional Weight Loss Findings  Severe    Unintentional Weight Loss   Weight loss greater than 10% in six months    Row Name 07/13/20 1609       Muscle Loss    Loss of Muscle Mass Findings  Moderate    Acromion Bone Region  Moderate - acromion may slightly protrude    Row Name 07/13/20 1609       Fat Loss    Subcutaneous Fat Loss Findings  Moderate    Orbital Region   Moderate -  somewhat hollowness, slightly dark circles    Row Name 07/13/20 1609       Criteria Met (Must meet criteria for severity in at least 2 of these categories: M Wasting, Fat Loss, Fluid, Secondary Signs, Wt. Status, Intake)    Patient meets criteria for   Severe Malnutrition          Electronically signed by:  Pily Tran RD  07/13/20 16:25

## 2020-07-13 NOTE — PLAN OF CARE
Pt c/o pain, given PRN pain meds when indicated. Tube feeding ordered. Will start when feeding is brought up. Worried about radiation. On phone with nurse at radiation center frequently throughout morning. VSS. Will cont to monitor pt.      Problem: Patient Care Overview  Goal: Plan of Care Review  Outcome: Ongoing (interventions implemented as appropriate)  Goal: Individualization and Mutuality  Outcome: Ongoing (interventions implemented as appropriate)  Goal: Discharge Needs Assessment  Outcome: Ongoing (interventions implemented as appropriate)  Goal: Interprofessional Rounds/Family Conf  Outcome: Ongoing (interventions implemented as appropriate)     Problem: Pain, Chronic (Adult)  Goal: Acceptable Pain/Comfort Level and Functional Ability  Outcome: Ongoing (interventions implemented as appropriate)     Problem: Nutrition, Enteral (Adult)  Goal: Signs and Symptoms of Listed Potential Problems Will be Absent, Minimized or Managed (Nutrition, Enteral)  Outcome: Ongoing (interventions implemented as appropriate)     Problem: Fluid Volume Deficit (Adult)  Goal: Optimal Fluid Balance  Outcome: Ongoing (interventions implemented as appropriate)     Problem: Renal Failure/Kidney Injury, Acute (Adult)  Goal: Signs and Symptoms of Listed Potential Problems Will be Absent, Minimized or Managed (Renal Failure/Kidney Injury, Acute)  Outcome: Ongoing (interventions implemented as appropriate)     Problem: Skin Injury Risk (Adult)  Goal: Identify Related Risk Factors and Signs and Symptoms  Outcome: Ongoing (interventions implemented as appropriate)  Goal: Skin Health and Integrity  Outcome: Ongoing (interventions implemented as appropriate)

## 2020-07-13 NOTE — DISCHARGE PLACEMENT REQUEST
"Helen Regalado (65 y.o. Male)     Date of Birth Social Security Number Address Home Phone MRN    1955  [id number] [phone] [id number]    Sikhism Marital Status          [facility] Single       Admission Date Admission Type Admitting Provider Attending Provider Department, Room/Bed    7/11/20 Emergency Arya Campo MD Broaddus, Emmett J., MD [address], [address]/1    Discharge Date Discharge Disposition Discharge Destination                       Attending Provider:  Brenden Burnette MD    Allergies:  Ether, Latex    Isolation:  None   Infection:  None   Code Status:  CPR    Ht:  175.3 cm (69\")   Wt:  88.3 kg (194 lb 9.6 oz)    Admission Cmt:  None   Principal Problem:  MARYBETH (acute kidney injury) (CMS/[facility]) [N17.9]                 Active Insurance as of 7/11/2020     Primary Coverage     Payor Plan Insurance Group Employer/Plan Group    MEDICARE MEDICARE A & B      Payor Plan Address Payor Plan Phone Number Payor Plan Fax Number Effective Dates    [address] [phone]  2/1/1992 - None Entered    [facility] [id number]       Subscriber Name Subscriber Birth Date Member ID       HELEN REGALADO 1955 [id number]           Secondary Coverage     Payor Plan Insurance Group Employer/Plan Group    [employer] [place] [employer] [place]     Payor Plan Address Payor Plan Phone Number Payor Plan Fax Number Effective Dates    [address] [place] [address] [address] [phone]  5/1/2018 - None Entered    [place] [address]       Subscriber Name Subscriber Birth Date Member ID       HELEN REGALADO 1955 [id number]                 Emergency Contacts      (Rel.) Home Phone Work Phone Mobile Phone    Terra Hernandez (Significant Other) [phone] -- [phone]    christy hennessy (Other) -- [phone] --              "

## 2020-07-13 NOTE — THERAPY EVALUATION
Acute Care - Speech Language Pathology   Swallow Initial Evaluation [facility]     Patient Name: Darryn Baxter  : 1955  MRN: [id number]  Today's Date: 2020               Admit Date: 2020    Visit Dx:     ICD-10-CM ICD-9-CM   1. Hypotension, unspecified hypotension type I95.9 458.9   2. Dehydration E86.0 276.51   3. Acute renal failure, unspecified acute renal failure type (CMS/HCC) N17.9 584.9     Patient Active Problem List   Diagnosis   • Hypertension   • Anxiety   • Impaired vision   • Impaired fasting glucose   • Blindness of both eyes   • Diverticulitis of intestine with perforation and abscess   • AAA (abdominal aortic aneurysm, ruptured) (CMS/HCC)   • Ruptured abdominal aortic aneurysm (AAA) (CMS/HCC)   • Morbidly obese (CMS/HCC)   • Hypotension   • MARYBETH (acute kidney injury) (CMS/HCC)   • Elevated troponin   • Hyperkalemia   • Cancer (CMS/HCC)   • Nausea & vomiting   • G tube feedings (CMS/HCC)     Past Medical History:   Diagnosis Date   • Abscess     COLONIC   • Arthritis    • Cancer (CMS/HCC)    • Floaters     VISION BETTER IN RIGHT VS LEFT   • Gastric reflux     ON OCCASION   • History of cellulitis     LEFT ANKLE ABOUT 10 YRS AGO   • History of migraine     VERY LIGHT SENSITIVE   • Hypertension    • Lumbar herniated disc    • RLF (retrolental fibroplasia)     SINCE CHILDHOOD   • Vision impairment      Past Surgical History:   Procedure Laterality Date   • ANGIOPLASTY FEMORAL ARTERY N/A 2020    Procedure: ruptured triple aneurysm endovascular repair;  Surgeon: Kev Pérez MD;  Location: [facility] OR ;  Service: Vascular;  Laterality: N/A;   • APPENDECTOMY     • ARTHROSCOPY SHOULDER / OPEN SHOULDER Right     ROTATOR CUFF   • BACK SURGERY      LUMBAR   • CATARACT EXTRACTION      LEFT AND RIGHT   • CHOLECYSTECTOMY     • COLON RESECTION N/A 3/29/2018    Procedure: HAND ASSISTED LAPAROSCOPIC LOW ANTERIOR COLON RESECTION;  Surgeon: Oneil Hallman MD;  Location:   [facility] OR;  Service: General   • COLONOSCOPY  2015   • FRACTURE SURGERY Left     ELBOW   • TONSILLECTOMY     • UMBILICAL HERNIA REPAIR N/A 10/5/2016    Procedure: UMBILICAL HERNIA REPAIR with mesh;  Surgeon: Oneil Hallman MD;  Location:  [facility] OR [facility];  Service:         SWALLOW EVALUATION (last 72 hours)      SLP Adult Swallow Evaluation     Row Name 07/13/20 1100                   Rehab Evaluation    Document Type  evaluation  -JJ        Subjective Information  no complaints  -JJ        Patient Observations  alert;cooperative;agree to therapy  -JJ        Patient/Family Observations  Patient was very alert and cooperative He provided a very detailed history of his recent medical procedures/events.  -JJ        Patient Effort  good  -JJ        Symptoms Noted During/After Treatment  none  -JJ           General Information    Patient Profile Reviewed  yes  -JJ        Pertinent History Of Current Problem  Patient admitted with weakness and vomitting. 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 He has been undergoing subsequent radiation treatments and per the patient he has 4 treatments left. At the time of his surgery the MD placed a PEG tube. Initially the patient was eating soft foods and drinking thin liquids and using his feeding tube as well. But then he began to gag while eating so he stopped that and has been using Boost through his tube for nutrition prior to admission. He had been taking his pills orally at home but has been getting them through his tube since admission. The patient did not want to try any solids on this date. He only wanted to try some water so that he might be able to sip on water.   -JJ        Current Method of Nutrition  NPO;gastrostomy feedings  -JJ        Precautions/Limitations, Vision  other (see comments) legally blind  -JJ        Precautions/Limitations, Hearing  WFL;for purposes of eval  -JJ         Prior Level of Function-Communication  WFL  -JJ        Prior Level of Function-Swallowing  alternative feeding method;other (see comments) PEG tube  -JJ        Plans/Goals Discussed with  patient;agreed upon  -JJ        Barriers to Rehab  medically complex;previous functional deficit  -JJ        Patient's Goals for Discharge  -- patient wanted sips of water  -JJ           Oral Motor and Function    Dentition Assessment  edentulous, dentures not available  -JJ        Secretion Management  WNL/WFL;other (see comments) during evaluation  -JJ        Mucosal Quality  moist, healthy  -JJ        Volitional Swallow  weak  -JJ        Volitional Cough  weak  -JJ           Oral Musculature and Cranial Nerve Assessment    Oral Motor General Assessment  lingual impairment  -JJ        Oral Motor, Comment  Patient has had some of the left side of his tongue removed. He has decreased range of motion with his tongue as well.  -JJ           General Eating/Swallowing Observations    Respiratory Support Currently in Use  nasal cannula  -JJ        Eating/Swallowing Skills  self-fed  -JJ        Positioning During Eating  upright 90 degree;other (see comments) on side of bed  -JJ        Utensils Used  cup;other (see comments) patient does not like to use straws  -JJ        Consistencies Trialed  thin liquids  -JJ           Respiratory    Respiratory Status  WFL  -JJ           Clinical Swallow Eval    Clinical Swallow Evaluation Summary  Patient was only agreeable to SLP giving him thin water. No overt s/s aspiration or difficulty noted with small, single sips of water. Patient is at an increased risk for aspiration given recent surgery and radiation. Patient is aware of those risks and wants to be able to sip on water. He is not interested in attempting to eat solid foods at this time as he knows it will be too difficult for him.  -JJ           Clinical Impression    SLP Swallowing Diagnosis  severe;oral dysfunction;suspected  pharyngeal dysfunction  -J        Functional Impact  risk of aspiration/pneumonia;risk of malnutrition;risk of dehydration  -J        Rehab Potential/Prognosis, Swallowing  good, to achieve stated therapy goals  -J        Swallow Criteria for Skilled Therapeutic Interventions Met  demonstrates skilled criteria  -JJ           Recommendations    Therapy Frequency (Swallow)  PRN  -        Predicted Duration Therapy Intervention (Days)  until discharge  -        SLP Diet Recommendation  NPO;water between meals after oral care, with supervision;long term alternate methods of nutrition/hydration;other (see comments) ALL nutrition via tube, patient may sip on thin water.  -J        Recommended Diagnostics  other (see comments) patient will need VFSS after he completes radiation  -J        Recommended Precautions and Strategies  upright posture during/after eating;small bites of food and sips of liquid;other (see comments) with thin water  -        SLP Rec. for Method of Medication Administration  meds via alternate route  -        Monitor for Signs of Aspiration  yes;notify SLP if any concerns  -J        Anticipated Dischage Disposition (SLP)  unknown;anticipate therapy at next level of care  -          User Key  (r) = Recorded By, (t) = Taken By, (c) = Cosigned By    Initials Name Effective Dates    Patt Jefferson, MS CCC-SLP 06/08/18 -           EDUCATION  The patient has been educated in the following areas:   Dysphagia (Swallowing Impairment) Oral Care/Hydration NPO rationale.    SLP Recommendation and Plan  SLP Swallowing Diagnosis: severe, oral dysfunction, suspected pharyngeal dysfunction  SLP Diet Recommendation: NPO, water between meals after oral care, with supervision, long term alternate methods of nutrition/hydration, other (see comments)(ALL nutrition via tube, patient may sip on thin water.)  Recommended Precautions and Strategies: upright posture during/after eating, small  bites of food and sips of liquid, other (see comments)(with thin water)  SLP Rec. for Method of Medication Administration: meds via alternate route     Monitor for Signs of Aspiration: yes, notify SLP if any concerns  Recommended Diagnostics: other (see comments)(patient will need VFSS after he completes radiation)  Swallow Criteria for Skilled Therapeutic Interventions Met: demonstrates skilled criteria  Anticipated Dischage Disposition (SLP): unknown, anticipate therapy at next level of care  Rehab Potential/Prognosis, Swallowing: good, to achieve stated therapy goals  Therapy Frequency (Swallow): PRN  Predicted Duration Therapy Intervention (Days): until discharge       Plan of Care Reviewed With: patient  Progress: no change     Patient was not wearing a face mask during this therapy encounter. Therapist used appropriate personal protective equipment including mask, eye protection and gloves.  Mask used was standard procedure mask. Appropriate PPE was worn during the entire therapy session. Hand hygiene was completed before and after therapy session. Patient is not in enhanced droplet precautions.        SLP Outcome Measures (last 72 hours)      SLP Outcome Measures     Row Name 07/13/20 1100             SLP Outcome Measures    Outcome Measure Used?  Adult NOMS  -         Adult FCM Scores    FCM Chosen  Swallowing  -      Swallowing FCM Score  1  -        User Key  (r) = Recorded By, (t) = Taken By, (c) = Cosigned By    Initials Name Effective Dates    Patt Jefferson MS CCC-SLP 06/08/18 -            Time Calculation:   Time Calculation- SLP     Row Name 07/13/20 1145             Time Calculation- SLP    SLP Received On  07/13/20  -RICHARD        User Key  (r) = Recorded By, (t) = Taken By, (c) = Cosigned By    Initials Name Provider Type    Patt Jefferson MS CCC-SLP Speech and Language Pathologist          Therapy Charges for Today     Code Description Service Date Service  Provider Modifiers Qty    [id number] [facility] ORAL PHARYNG SWALLOW 4 7/13/2020 Patt Theodore, MS CCC-SLP GN 1               Patt Theodore, MS CCC-SLP  7/13/2020

## 2020-07-14 ENCOUNTER — READMISSION MANAGEMENT (OUTPATIENT)
Dept: CALL CENTER | Facility: HOSPITAL | Age: 65
End: 2020-07-14

## 2020-07-14 VITALS
WEIGHT: 196.7 LBS | OXYGEN SATURATION: 97 % | HEIGHT: 69 IN | TEMPERATURE: 97.7 F | HEART RATE: 58 BPM | RESPIRATION RATE: 16 BRPM | SYSTOLIC BLOOD PRESSURE: 123 MMHG | BODY MASS INDEX: 29.13 KG/M2 | DIASTOLIC BLOOD PRESSURE: 71 MMHG

## 2020-07-14 LAB
ANION GAP SERPL CALCULATED.3IONS-SCNC: 10.7 MMOL/L (ref 5–15)
BUN SERPL-MCNC: 36 MG/DL (ref 8–23)
BUN/CREAT SERPL: 39.6 (ref 7–25)
CALCIUM SPEC-SCNC: 9.5 MG/DL (ref 8.6–10.5)
CHLORIDE SERPL-SCNC: 107 MMOL/L (ref 98–107)
CO2 SERPL-SCNC: 22.3 MMOL/L (ref 22–29)
CREAT SERPL-MCNC: 0.91 MG/DL (ref 0.76–1.27)
GFR SERPL CREATININE-BSD FRML MDRD: 83 ML/MIN/1.73
GLUCOSE SERPL-MCNC: 94 MG/DL (ref 65–99)
POTASSIUM SERPL-SCNC: 5.2 MMOL/L (ref 3.5–5.2)
SODIUM SERPL-SCNC: 140 MMOL/L (ref 136–145)

## 2020-07-14 PROCEDURE — 80048 BASIC METABOLIC PNL TOTAL CA: CPT | Performed by: HOSPITALIST

## 2020-07-14 PROCEDURE — 63710000001 PREDNISONE PER 1 MG: Performed by: HOSPITALIST

## 2020-07-14 RX ADMIN — OXYCODONE 10 MG: 5 TABLET ORAL at 14:39

## 2020-07-14 RX ADMIN — OXYCODONE 10 MG: 5 TABLET ORAL at 02:28

## 2020-07-14 RX ADMIN — NYSTATIN 500000 UNITS: 100000 SUSPENSION ORAL at 10:12

## 2020-07-14 RX ADMIN — OXYCODONE 10 MG: 5 TABLET ORAL at 10:12

## 2020-07-14 RX ADMIN — SODIUM CHLORIDE 125 ML/HR: 9 INJECTION, SOLUTION INTRAVENOUS at 06:34

## 2020-07-14 RX ADMIN — PREDNISONE 20 MG: 20 TABLET ORAL at 10:13

## 2020-07-14 RX ADMIN — PANTOPRAZOLE SODIUM 40 MG: 40 TABLET, DELAYED RELEASE ORAL at 10:12

## 2020-07-14 RX ADMIN — NYSTATIN 500000 UNITS: 100000 SUSPENSION ORAL at 13:27

## 2020-07-14 RX ADMIN — OXYCODONE 10 MG: 5 TABLET ORAL at 06:27
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
## 2020-07-14 NOTE — PROGRESS NOTES
Continued Stay Note  [facility]     Patient Name: Darryn Baxter  MRN: [id number]  Today's Date: 7/14/2020    Admit Date: 7/11/2020    Discharge Plan     Row Name 07/14/20 1440       Plan    Plan Comments  Dietician note and physician progress note faxed to Anuja villalobos [facility] per request (fax# [phone]).            Expected Discharge Date and Time     Expected Discharge Date Expected Discharge Time    Jul 14, 2020             Sunita Berger RN

## 2020-07-14 NOTE — NURSING NOTE
Pt being d/c'd. Wife downstairs awaiting pt. Transport called to come  and take to d/c lobby. IV taken out. D/c instructions went over with pt. VSS.

## 2020-07-14 NOTE — PROGRESS NOTES
Nutrition Services    Patient Name:  Darryn Baxter  YOB: 1955  MRN: [id number]  Admit Date:  7/11/2020    Spoke with patient about TF recommendations for home and provided home TF packet.      Electronically signed by:  Pily Tran RD  07/14/20 16:11

## 2020-07-14 NOTE — PROGRESS NOTES
Continued Stay Note  [facility]     Patient Name: Darryn Baxter  MRN: [id number]  Today's Date: 7/14/2020    Admit Date: 7/11/2020    Discharge Plan     Row Name 07/14/20 2467       Plan    Plan  Home with spouse and possibly home health services.      Plan Comments  Inbound call rec'd from patient.  Anxious about poss d/c.  Concern he isnt familiar with new feedings/ using the bags.  Patient was agreeable to home health at d/c.  CCP spoke with dietician who is also assisting patient.  Bolus dose this afternoon and if tolerates possible d/c later this evening.  [facility] referral was made to [facility]AL[facility].  CCP to follow-up.               Expected Discharge Date and Time     Expected Discharge Date Expected Discharge Time    Jul 14, 2020             Sunita Berger RN

## 2020-07-14 NOTE — PROGRESS NOTES
Continued Stay Note  [facility]     Patient Name: Darryn Baxter  MRN: [id number]  Today's Date: 7/14/2020    Admit Date: 7/11/2020    Discharge Plan     Row Name 07/14/20 1404       Plan    Plan Comments  Call placed to Anuja with [facility] @ [phone], they are able to provide some skilled nursing and will contact family and schedule appt.            Expected Discharge Date and Time     Expected Discharge Date Expected Discharge Time    Jul 14, 2020             Sunita Berger RN

## 2020-07-14 NOTE — DISCHARGE PLACEMENT REQUEST
"Helen Regalado (65 y.o. Male)     Date of Birth Social Security Number Address Home Phone MRN    1955  [address] [facility] [address] [phone] [id number]    Hinduism Marital Status          [facility] Single       Admission Date Admission Type Admitting Provider Attending Provider Department, Room/Bed    7/11/20 Emergency Arya Campo MD Broaddus, Emmett J., MD [address], [address]/1    Discharge Date Discharge Disposition Discharge Destination                       Attending Provider:  Brenden Burnette MD    Allergies:  Ether, Latex    Isolation:  None   Infection:  None   Code Status:  CPR    Ht:  175.3 cm (69.02\")   Wt:  89.2 kg (196 lb 11.2 oz)    Admission Cmt:  None   Principal Problem:  MARYBETH (acute kidney injury) (CMS/[facility]) [N17.9]                 Active Insurance as of 7/11/2020     Primary Coverage     Payor Plan Insurance Group Employer/Plan Group    MEDICARE MEDICARE A & B      Payor Plan Address Payor Plan Phone Number Payor Plan Fax Number Effective Dates    [address] [phone]  2/1/1992 - None Entered    [facility] [id number]       Subscriber Name Subscriber Birth Date Member ID       HELEN REGALADO 1955 [id number]           Secondary Coverage     Payor Plan Insurance Group Employer/Plan Group    [employer] [place] [employer] [place] [employer]     Payor Plan Address Payor Plan Phone Number Payor Plan Fax Number Effective Dates    [address] [place] [address] [phone]  5/1/2018 - None Entered    [place] [address]       Subscriber Name Subscriber Birth Date Member ID       HELEN REGALADO 1955 [id number]                 Emergency Contacts      (Rel.) Home Phone Work Phone Mobile Phone    Terra Hernandez (Significant Other) [phone] -- [phone]    christy hennessy (Other) -- [phone] --              "

## 2020-07-14 NOTE — NURSING NOTE
Pt took off monitor while cleaning self up. Resistant to put monitor back on. Potential d/c later today. Spoke with Dr. Burnette and he said it was okay to leave monitor off for now.

## 2020-07-14 NOTE — PLAN OF CARE
Patient  started  on  tube  feeding  of  Isosource  1.5  at  20ml and  hour,  Now  infusing  at  30ml/hr.  Due  next  increase  at Noon.  So  far  tolerating  well  with  no  nausea  noted.  Also  started on  Nystatin   for oral  thrush.   Up  and  ambulated  around  the  unit once  this  shift  and  up  in  the room  multiple  times. Gait  steady.  Medicated  for  pain  every  4  hours.    No  respiratory   distress  noted.    SR, SB  with  elevated  T on monitor.   Nursing will  continue  to monitor.  Problem: Patient Care Overview  Goal: Plan of Care Review  Outcome: Ongoing (interventions implemented as appropriate)  Flowsheets  Taken 7/13/2020 1142 by Patt Theodore, MS CCC-SLP  Progress: no change  Taken 7/14/2020 0527 by Moose Boswell RN  Plan of Care Reviewed With: patient     Problem: Pain, Chronic (Adult)  Goal: Acceptable Pain/Comfort Level and Functional Ability  Outcome: Ongoing (interventions implemented as appropriate)  Flowsheets (Taken 7/14/2020 0527)  Acceptable Pain/Comfort Level and Functional Ability: making progress toward outcome     Problem: Nutrition, Enteral (Adult)  Goal: Signs and Symptoms of Listed Potential Problems Will be Absent, Minimized or Managed (Nutrition, Enteral)  Outcome: Ongoing (interventions implemented as appropriate)  Flowsheets (Taken 7/14/2020 0527)  Problems Assessed (Enteral Nutrition): all     Problem: Fluid Volume Deficit (Adult)  Goal: Optimal Fluid Balance  Outcome: Ongoing (interventions implemented as appropriate)  Flowsheets (Taken 7/14/2020 0527)  Optimal Fluid Balance: making progress toward outcome     Problem: Renal Failure/Kidney Injury, Acute (Adult)  Goal: Signs and Symptoms of Listed Potential Problems Will be Absent, Minimized or Managed (Renal Failure/Kidney Injury, Acute)  Outcome: Ongoing (interventions implemented as appropriate)  Flowsheets (Taken 7/14/2020 0527)  Problems Assessed (Acute Renal Failure/Kidney Injury):  all  Problems Present (ARF/Kidney Injury): electrolyte imbalance; fluid imbalance     Problem: Skin Injury Risk (Adult)  Goal: Skin Health and Integrity  Outcome: Ongoing (interventions implemented as appropriate)  Flowsheets (Taken 7/14/2020 1763)  Skin Health and Integrity: making progress toward outcome

## 2020-07-14 NOTE — OUTREACH NOTE
Prep Survey      Responses   [facility] patient discharged from?  [facility]   Is LACE score < 7 ?  No   Eligibility  [facility]   Date of Admission  07/11/20   Date of Discharge  07/14/20   Discharge Disposition  Home or Self Care   Discharge diagnosis   MARYBETH (acute kidney injury),    Nausea & vomiting,    Hyperkalemia,    Cancer, head and neck,    G tube feedings      COVID-19 Test Status  Not tested   Does the patient have one of the following disease processes/diagnoses(primary or secondary)?  Other   Does the patient have Home health ordered?  Yes   What is the Home health agency?   [facility]   Is there a DME ordered?  No [[employer] provides tube feedings]   General alerts for this patient  Blindness of both eyes   Prep survey completed?  Yes          Beulah Clark RN

## 2020-07-14 NOTE — PROGRESS NOTES
"Adult Nutrition  Assessment/PES    Patient Name:  Darryn Baxter  YOB: 1955  MRN: [id number]  Admit Date:  7/11/2020    Assessment Date:  7/14/2020    Comments:  Nutrition follow up.  Spoke with Dr. Burnette and YUDITH Villar.  Plans to switch TFs to boluses for home.  Recommend Isosource 1.5 - 6.5 cans/day, which will provide 2438 kcal/day and 111 grams protein day (100% estimated kcal and protein needs).  Recommend eventual bolus regimen of:    1.5 carton (375 mL) at 8 am, 12 pm, & 4 pm  2 cartons (500 mL) at 8 pm/bedtime    Recommend starting boluses this afternoon with first bolus being 1/2 carton (125 mL).  If tolerates, recommend giving another bolus 4 hours later of 1 carton (250 mL).  If tolerates, full carton then can increase to 1.5 cartons (375 mL).    Spoke with CCP who is going to try to get HH to come educate patient on home TFs.  This RD will visit patient later today to answer questions and assist as able.    RD to continue to follow closely.    Reason for Assessment     Row Name 07/14/20 1315          Reason for Assessment    Reason For Assessment  TF/PN         Nutrition/Diet History     Row Name 07/14/20 1315          Nutrition/Diet History    Typical Food/Fluid Intake  tolerating continuous feeds, Dr. Burnette wants to switch over to bolus         Anthropometrics     Row Name 07/14/20 1315 07/14/20 0600       Anthropometrics    Height  175.3 cm (69.02\")  --    Weight  --  89.2 kg (196 lb 11.2 oz)       Admit Weight    Admit Weight  -- 196# 7/14  --       Ideal Body Weight (IBW)    Ideal Body Weight (IBW) (kg)  73.73  --       Body Mass Index (BMI)    BMI Assessment  BMI 25-29.9: overweight 28.98  --        Labs/Tests/Procedures/Meds     Row Name 07/14/20 1316          Labs/Procedures/Meds    Lab Results Reviewed  reviewed, pertinent     Lab Results Comments  BUN, Hgb, Hct        Diagnostic Tests/Procedures    Diagnostic Test/Procedure Reviewed  reviewed, pertinent        Medications    " "Pertinent Medications Reviewed  reviewed, pertinent     Pertinent Medications Comments  protonix         Physical Findings     Row Name 07/14/20 1316          Physical Findings    Overall Physical Appearance  overweight     Gastrointestinal  feeding tube     Tubes  PEG     Oral/Mouth Cavity  edentulous;other (see comments);thrush     Skin  other (see comments) B=19, neck red and peeling from radiation         Estimated/Assessed Needs     Row Name 07/14/20 1315          Calculation Measurements    Height  175.3 cm (69.02\")         Nutrition Prescription Ordered     Row Name 07/14/20 1317          Nutrition Prescription PO    Current PO Diet  NPO;Sips/ice chips        Nutrition Prescription EN    Enteral Route  PEG     Product  Isosource 1.5 (Jevity 1.5)     TF Delivery Method  Continuous     Continuous TF Goal Rate (mL/hr)  70 mL/hr     Continuous TF Current Rate (mL/hr)  0 mL/hr turned off in order to start boluses later this afternoon     Water flush (mL)   30 mL     Water Flush Frequency  Every 4 hours         Problem/Interventions:    Intervention Goal     Row Name 07/14/20 1317          Intervention Goal    General  Maintain nutrition;Nutrition support treatment;Disease management/therapy;Meet nutritional needs for age/condition;Reduce/improve symptoms     TF/PN  Establish TF tolerance;Adjust TF/PN;Deliver estimated need (%)     Deliver % of Estimated Need  100 %     Weight  Maintain weight         Nutrition Intervention     Row Name 07/14/20 1318          Nutrition Intervention    RD/Tech Action  Follow Tx progress;Care plan reviewd;Recommend/ordered     Recommended/Ordered  EN         Nutrition Prescription     Row Name 07/14/20 1319          Nutrition Prescription EN    Enteral Prescription  Enteral begin/change;Enteral to supply     Enteral Route  PEG     Product  Isosource 1.5 ron     TF Delivery Method  Bolus     TF Bolus Goal Volume (mL)  500 mL     TF Bolus Starting Volume (mL)  125 mL     TF Bolus " Frequency  4 times a day     Water flush (mL)   200 mL     Water Flush Frequency  Every feeding 100 ml before and after each bolus     New EN Prescription Ordered?  Yes        EN to Supply    Kcal/Day  2438 Kcal/Day     Kcal/Kg  27 Kcal/Kg     Kcal/Kg Weight Method  Actual weight     Protein (gm/day)  111 gm/day     Meet Estimated Kcal Need (%)  100 %     Meet Estimated Protein Need (%)  100 %     TF Free H2O (mL)  1242 mL     Total Free H2O (mL/day)  2042 mL/day TFs + flushes         Education/Evaluation     Row Name 07/14/20 1321          Monitor/Evaluation    Monitor  Per protocol;I&O;Pertinent labs;TF delivery/tolerance;Weight;Symptoms;Skin status     Education Follow-up  [employer] PRN           Electronically signed by:  Pily Tran RD  07/14/20 13:21

## 2020-07-14 NOTE — PROGRESS NOTES
"DAILY PROGRESS NOTE  [facility]    Patient Identification:  Name: Darryn Baxter  Age: 65 y.o.  Sex: male  :  1955  MRN: [id number]         Primary Care Physician: Darryn Nielsen MD      Subjective  Patient has been tolerating tube feedings very nicely.  No nausea, no vomiting.  No abdominal discomfort.  Complains of thrush in the back of his throat.    Spoke with the radiation oncology office.  To their knowledge the prednisone was discontinued in May.    Objective:  General Appearance:  Comfortable, well-appearing, in no acute distress and not in pain.    Vital signs: (most recent): Blood pressure 123/71, pulse 58, temperature 97.7 °F (36.5 °C), temperature source Oral, resp. rate 16, height 175.3 cm (69.02\"), weight 89.2 kg (196 lb 11.2 oz), SpO2 97 %.    HEENT: (Significant amount of postnasal drip noted in the posterior pharynx area.  Amongst his drainage there may be some patchy white areas on the soft palate.)    Lungs:  Normal effort and normal respiratory rate.  Breath sounds clear to auscultation.    Heart: Normal rate.  Regular rhythm.    Abdomen: Abdomen is soft and non-distended.  Bowel sounds are normal.   There is no abdominal tenderness.     Extremities: There is no dependent edema.    Neurological: Patient is alert and oriented to person, place and time.    Skin:  Warm and dry.  (Radiation burns present around the neck.)              Vital signs in last 24 hours:  Temp:  [97.3 °F (36.3 °C)-98.4 °F (36.9 °C)] 97.7 °F (36.5 °C)  Heart Rate:  [58-64] 58  Resp:  [16-18] 16  BP: (100-127)/(56-71) 123/71    Intake/Output:    Intake/Output Summary (Last 24 hours) at 2020 1149  Last data filed at 2020 1123  Gross per 24 hour   Intake 369 ml   Output 1525 ml   Net -1156 ml         Results from last 7 days   Lab Units 20  0650 20  0220 20   WBC 10*3/mm3 4.63 5.52 6.73   HEMOGLOBIN g/dL 10.0* 10.0* 11.6*   PLATELETS 10*3/mm3 152 145 180     Results " from last 7 days   Lab Units 07/14/20  0807 07/13/20  1528 07/13/20  0650 07/12/20 0220 07/11/20 2024   SODIUM mmol/L 140 143 142 140 137   POTASSIUM mmol/L 5.2 4.7 5.7* 5.5* 5.7*   CHLORIDE mmol/L 107 112* 110* 106 99   CO2 mmol/L 22.3 19.4* 21.3* 21.0* 22.5   BUN mg/dL 36* 41* 50* 95* 105*   CREATININE mg/dL 0.91 0.90 1.12 2.35* 3.41*   GLUCOSE mg/dL 94 95 105* 100* 115*   Estimated Creatinine Clearance: 87 mL/min (by C-G formula based on SCr of 0.91 mg/dL).  Results from last 7 days   Lab Units 07/14/20  0807 07/13/20  1528 07/13/20  0650 07/12/20 0220 07/11/20 2024   CALCIUM mg/dL 9.5 8.6 9.4 9.3 10.3   ALBUMIN g/dL  --   --  3.60  --  4.20   PHOSPHORUS mg/dL  --   --  4.2  --   --      Results from last 7 days   Lab Units 07/13/20  0650 07/11/20 2024   ALBUMIN g/dL 3.60 4.20   BILIRUBIN mg/dL  --  0.5   ALK PHOS U/L  --  52   AST (SGOT) U/L  --  36   ALT (SGPT) U/L  --  28       Assessment:    MARYBETH (acute kidney injury):  Resolved.  DC IV fluids      Nausea & vomiting:  Resolved and presently tolerating tube feeding drip well.  He very much wants to try to stay on a bolus regime if possible.  We will discontinued the tube feeding drips and start bolus feedings around 3 - 4 PM this afternoon.  If he does well then okay for discharge.      Hyperkalemia: Keep ACE inhibitor is on hold.      Hypotension: Secondary dehydration.  Resolved.      Hypertension      Blindness of both eyes      Elevated troponin      Cancer, head and neck:      G tube feedings: Please see above.      Plan:  Please see above.  Discussed with RN and nutrition.  Over 30 minutes spent with over one half in counseling and medical management    Brenden Burnette MD  7/14/2020  11:49

## 2020-07-15 ENCOUNTER — TRANSITIONAL CARE MANAGEMENT TELEPHONE ENCOUNTER (OUTPATIENT)
Dept: CALL CENTER | Facility: HOSPITAL | Age: 65
End: 2020-07-15

## 2020-07-15 NOTE — OUTREACH NOTE
Call Center TCM Note      Responses   [facility] patient discharged from?  [facility]   COVID-19 Test Status  Not tested   Does the patient have one of the following disease processes/diagnoses(primary or secondary)?  Other   TCM attempt successful?  Yes   Call start time  1633   Call end time  1644   Discharge diagnosis   MARYBETH (acute kidney injury),    Nausea & vomiting,    Hyperkalemia,    Cancer, head and neck,    G tube feedings      Is patient permission given to speak with other caregiver?  No   Meds reviewed with patient/caregiver?  Yes   Is the patient having any side effects they believe may be caused by any medication additions or changes?  No   Does the patient have all medications ordered at discharge?  Yes   Is the patient taking all medications as directed (includes completed medication regime)?  Yes   Does the patient have a primary care provider?   Yes   Does the patient have an appointment with their PCP within 7 days of discharge?  Yes   Comments regarding PCP  PCP Dr Nielsen. Hospital f/u schedule 7/21/20 1pm.    Has the patient kept scheduled appointments due by today?  N/A   What is the Home health agency?   Patient declined HH.    Has home health visited the patient within 72 hours of discharge?  N/A   DME comments  Patient reports that he has received his tube feeding and has all supplies needed at this time.    Pulse Ox monitoring  None   Psychosocial issues?  No   Comments  Patient reports that he is following the directions and slowly increasing his bolus feedings to reach 375 ml every 4 hrs.    Did the patient receive a copy of their discharge instructions?  Yes   Nursing interventions  Reviewed instructions with patient   What is the patient's perception of their health status since discharge?  Improving   Is the patient/caregiver able to teach back signs and symptoms related to disease process for when to call PCP?  Yes   Is the patient/caregiver able to teach back signs and  symptoms related to disease process for when to call 911?  Yes   Is the patient/caregiver able to teach back the hierarchy of who to call/visit for symptoms/problems? PCP, Specialist, Home health nurse, Urgent Care, ED, 911  Yes   TCM call completed?  Yes          Charlotte Marquez RN    7/15/2020, 16:44

## 2020-07-15 NOTE — OUTREACH NOTE
Call Center TCM Note      Responses   [facility] patient discharged from?  [place]   COVID-19 Test Status  Not tested   Does the patient have one of the following disease processes/diagnoses(primary or secondary)?  Other   TCM attempt successful?  No   Unsuccessful attempts  Attempt 1          Charlotte Marquez RN    7/15/2020, 10:11

## 2020-07-16 LAB
BACTERIA SPEC AEROBE CULT: NORMAL
BACTERIA SPEC AEROBE CULT: NORMAL

## 2020-07-22 ENCOUNTER — READMISSION MANAGEMENT (OUTPATIENT)
Dept: CALL CENTER | Facility: HOSPITAL | Age: 65
End: 2020-07-22

## 2020-07-22 NOTE — OUTREACH NOTE
Medical Week 2 Survey      Responses   [facility] patient discharged from?  [facility]   COVID-19 Test Status  Not tested   Does the patient have one of the following disease processes/diagnoses(primary or secondary)?  Other   Week 2 attempt successful?  No   Unsuccessful attempts  Attempt 1          Kathie Woody RN

## 2020-07-27 ENCOUNTER — READMISSION MANAGEMENT (OUTPATIENT)
Dept: CALL CENTER | Facility: HOSPITAL | Age: 65
End: 2020-07-27

## 2020-07-27 NOTE — OUTREACH NOTE
Medical Week 2 Survey      Responses   [facility]   COVID-19 Test Status  Not tested   Does the patient have one of the following disease processes/diagnoses(primary or secondary)?  Other   Week 2 attempt successful?  Yes   Call start time  1131   General alerts for this patient  Blindness of both eyes   Discharge diagnosis   MARYBETH (acute kidney injury),    Nausea & vomiting,    Hyperkalemia,    Cancer, head and neck,    G tube feedings      Call end time  1135   Meds reviewed with patient/caregiver?  Yes   Is the patient having any side effects they believe may be caused by any medication additions or changes?  No   Does the patient have all medications ordered at discharge?  Yes   Is the patient taking all medications as directed (includes completed medication regime)?  Yes   Does the patient have a primary care provider?   Yes   Comments regarding PCP  PCP Dr Nielsen. Hospital f/u schedule 8/3/20   Has the patient kept scheduled appointments due by today?  Yes   Has home health visited the patient within 72 hours of discharge?  N/A   DME comments  Patient reports that he has received his tube feeding and has all supplies needed at this time.    Pulse Ox monitoring  None   Psychosocial issues?  No   Did the patient receive a copy of their discharge instructions?  Yes   Nursing interventions  Reviewed instructions with patient   What is the patient's perception of their health status since discharge?  Improving   Is the patient/caregiver able to teach back signs and symptoms related to disease process for when to call PCP?  Yes   Is the patient/caregiver able to teach back signs and symptoms related to disease process for when to call 911?  Yes   Is the patient/caregiver able to teach back the hierarchy of who to call/visit for symptoms/problems? PCP, Specialist, Home health nurse, Urgent Care, ED, 911  Yes   Additional teach back comments  BP doing well, checking every 4  hours. Tube Feedings TID 6 container, tolerating well. Finished radiation.    Week 2 Call Completed?  Yes          Patt Carney RN

## 2020-08-03 ENCOUNTER — OFFICE VISIT (OUTPATIENT)
Dept: FAMILY MEDICINE CLINIC | Facility: CLINIC | Age: 65
End: 2020-08-03

## 2020-08-03 VITALS
BODY MASS INDEX: 28.88 KG/M2 | WEIGHT: 195 LBS | TEMPERATURE: 97.1 F | DIASTOLIC BLOOD PRESSURE: 51 MMHG | SYSTOLIC BLOOD PRESSURE: 84 MMHG | RESPIRATION RATE: 16 BRPM | OXYGEN SATURATION: 98 % | HEIGHT: 69 IN | HEART RATE: 68 BPM

## 2020-08-03 DIAGNOSIS — H54.7 IMPAIRED VISION: ICD-10-CM

## 2020-08-03 DIAGNOSIS — I10 ESSENTIAL HYPERTENSION: Primary | ICD-10-CM

## 2020-08-03 DIAGNOSIS — R73.01 IMPAIRED FASTING GLUCOSE: ICD-10-CM

## 2020-08-03 DIAGNOSIS — C80.1 CANCER (HCC): ICD-10-CM

## 2020-08-03 PROCEDURE — 99214 OFFICE O/P EST MOD 30 MIN: CPT | Performed by: FAMILY MEDICINE

## 2020-08-03 RX ORDER — DESOXIMETASONE 2.5 MG/G
CREAM TOPICAL 2 TIMES DAILY
Qty: 1 EACH | Refills: 3 | Status: SHIPPED | OUTPATIENT
Start: 2020-08-03 | End: 2022-03-21

## 2020-08-03 RX ORDER — PANTOPRAZOLE SODIUM 40 MG/1
40 TABLET, DELAYED RELEASE ORAL 2 TIMES DAILY
Qty: 180 TABLET | Refills: 1 | Status: SHIPPED | OUTPATIENT
Start: 2020-08-03 | End: 2020-11-01

## 2020-08-03 RX ORDER — NEOMYCIN SULFATE, POLYMYXIN B SULFATE AND HYDROCORTISONE 10; 3.5; 1 MG/ML; MG/ML; [USP'U]/ML
SUSPENSION/ DROPS AURICULAR (OTIC)
COMMUNITY
End: 2020-08-03 | Stop reason: SDUPTHER

## 2020-08-03 RX ORDER — NICOTINE 21 MG/24HR
1 PATCH, TRANSDERMAL 24 HOURS TRANSDERMAL EVERY 24 HOURS
Qty: 28 PATCH | Refills: 0 | Status: SHIPPED | OUTPATIENT
Start: 2020-08-03 | End: 2020-08-31

## 2020-08-03 RX ORDER — NEOMYCIN SULFATE, POLYMYXIN B SULFATE AND HYDROCORTISONE 10; 3.5; 1 MG/ML; MG/ML; [USP'U]/ML
3 SUSPENSION/ DROPS AURICULAR (OTIC) 3 TIMES DAILY
Qty: 10 ML | Refills: 1 | Status: SHIPPED | OUTPATIENT
Start: 2020-08-03 | End: 2022-03-21

## 2020-08-03 RX ORDER — NEOMYCIN SULFATE, POLYMYXIN B SULFATE AND HYDROCORTISONE 10; 3.5; 1 MG/ML; MG/ML; [USP'U]/ML
3 SUSPENSION/ DROPS AURICULAR (OTIC) 3 TIMES DAILY
Qty: 10 ML | Refills: 0 | Status: SHIPPED | OUTPATIENT
Start: 2020-08-03 | End: 2020-08-03 | Stop reason: SDUPTHER

## 2020-08-03 NOTE — PROGRESS NOTES
Subjective   Chief Complaint:   Chief Complaint   Patient presents with   • Hypotension         History of Present Illness Patient came into the office today to refill medications. I refilled his Topicort cream, Cortisporin otic suspension, nicotine patch, Protonix 40mg daily and Silvadene cream. He has a history of tongue/oral cancer. He is being followed by his specialists and is doing well. He is not longer smoking cigarettes. Again, he is followed closely by his oral surgeon.       Past Medical History:   Diagnosis Date   • Abscess     COLONIC   • Arthritis    • Cancer (CMS/HCC)    • Floaters     VISION BETTER IN RIGHT VS LEFT   • Gastric reflux     ON OCCASION   • History of cellulitis     LEFT ANKLE ABOUT 10 YRS AGO   • History of migraine     VERY LIGHT SENSITIVE   • Hypertension    • Lumbar herniated disc    • RLF (retrolental fibroplasia)     SINCE CHILDHOOD   • Vision impairment         Darryn Baxter 65 y.o. male who presents today for Medical Management of the below listed issues and medication refills.    ICD-10-CM ICD-9-CM   1. Essential hypertension I10 401.9   2. Cancer (CMS/HCC) C80.1 199.1   3. Impaired fasting glucose R73.01 790.21   4. Impaired vision H54.7 369.9        he has a problem list of   Patient Active Problem List   Diagnosis   • Hypertension   • Anxiety   • Impaired vision   • Impaired fasting glucose   • Blindness of both eyes   • Diverticulitis of intestine with perforation and abscess   • AAA (abdominal aortic aneurysm, ruptured) (CMS/[facility])   • Ruptured abdominal aortic aneurysm (AAA) (CMS/HCC)   • Morbidly obese (CMS/[facility])   • Hypotension   • MARYBETH (acute kidney injury) (CMS/[facility])   • Elevated troponin   • Hyperkalemia   • Cancer (CMS/HCC)   • Nausea & vomiting   • G tube feedings (CMS/[facility])   .    he has been compliant with   Current Outpatient Medications:   •  albuterol sulfate  (90 Base) MCG/ACT inhaler, Inhale 2 puffs Every 4 (Four) Hours As Needed for Wheezing., Disp: 1  "inhaler, Rfl: 0  •  atorvastatin (LIPITOR) 20 MG tablet, Take 1 tablet by mouth Every Evening., Disp: 30 tablet, Rfl: 3  •  calcium polycarbophil (FIBERCON) 625 MG tablet, Take 625 mg by mouth Daily., Disp: , Rfl:   •  OXYCODONE HCL PO, Take 10 mg by mouth Every 4 (Four) Hours While Awake., Disp: , Rfl:   •  pantoprazole (PROTONIX) 40 MG EC tablet, Take 1 tablet by mouth 2 (two) times a day for 90 days., Disp: 180 tablet, Rfl: 1  •  silver sulfadiazine (SILVADENE, SSD) 1 % cream, Apply  topically to the appropriate area as directed Daily., Disp: 25 g, Rfl: 2  •  vitamin C (ASCORBIC ACID) 500 MG tablet, Take 1,000 mg by mouth Daily., Disp: , Rfl:   •  desoximetasone (TOPICORT) 0.25 % cream, Apply  topically to the appropriate area as directed 2 (Two) Times a Day., Disp: 1 each, Rfl: 3  •  neomycin-polymyxin-hydrocortisone (CORTISPORIN) 3.5-42909-3 otic suspension, Administer 3 drops into both ears 3 (Three) Times a Day., Disp: 10 mL, Rfl: 1  •  nicotine (Nicotine Step 1) 21 MG/24HR patch, Place 1 patch on the skin as directed by provider Daily for 28 days., Disp: 28 patch, Rfl: 0.  he denies medication side effects.        BP (!) 84/51   Pulse 68   Temp 97.1 °F (36.2 °C)   Resp 16   Ht 175.3 cm (69.02\")   Wt 88.5 kg (195 lb)   SpO2 98%   BMI 28.78 kg/m²     Results for orders placed or performed during the hospital encounter of 07/11/20   Blood Culture - Blood, Arm, Right   Result Value Ref Range    Blood Culture No growth at 5 days    Blood Culture - Blood, Arm, Right   Result Value Ref Range    Blood Culture No growth at 5 days    Comprehensive Metabolic Panel   Result Value Ref Range    Glucose 115 (H) 65 - 99 mg/dL     (H) 8 - 23 mg/dL    Creatinine 3.41 (H) 0.76 - 1.27 mg/dL    Sodium 137 136 - 145 mmol/L    Potassium 5.7 (H) 3.5 - 5.2 mmol/L    Chloride 99 98 - 107 mmol/L    CO2 22.5 22.0 - 29.0 mmol/L    Calcium 10.3 8.6 - 10.5 mg/dL    Total Protein 7.0 6.0 - 8.5 g/dL    Albumin 4.20 3.50 - 5.20 " g/dL    ALT (SGPT) 28 1 - 41 U/L    AST (SGOT) 36 1 - 40 U/L    Alkaline Phosphatase 52 39 - 117 U/L    Total Bilirubin 0.5 0.0 - 1.2 mg/dL    eGFR Non African Amer 18 (L) >60 mL/min/1.73    Globulin 2.8 gm/dL    A/G Ratio 1.5 g/dL    BUN/Creatinine Ratio 30.8 (H) 7.0 - 25.0    Anion Gap 15.5 (H) 5.0 - 15.0 mmol/L   Lactic Acid, Plasma   Result Value Ref Range    Lactate 1.2 0.5 - 2.0 mmol/L   Procalcitonin   Result Value Ref Range    Procalcitonin 0.31 (H) 0.00 - 0.25 ng/mL   Troponin   Result Value Ref Range    Troponin T 0.066 (C) 0.000 - 0.030 ng/mL   CBC Auto Differential   Result Value Ref Range    WBC 6.73 3.40 - 10.80 10*3/mm3    RBC 3.60 (L) 4.14 - 5.80 10*6/mm3    Hemoglobin 11.6 (L) 13.0 - 17.7 g/dL    Hematocrit 34.1 (L) 37.5 - 51.0 %    MCV 94.7 79.0 - 97.0 fL    MCH 32.2 26.6 - 33.0 pg    MCHC 34.0 31.5 - 35.7 g/dL    RDW 13.2 12.3 - 15.4 %    RDW-SD 45.6 37.0 - 54.0 fl    MPV 9.7 6.0 - 12.0 fL    Platelets 180 140 - 450 10*3/mm3    Neutrophil % 79.3 (H) 42.7 - 76.0 %    Lymphocyte % 5.8 (L) 19.6 - 45.3 %    Monocyte % 10.7 5.0 - 12.0 %    Eosinophil % 3.3 0.3 - 6.2 %    Basophil % 0.6 0.0 - 1.5 %    Immature Grans % 0.3 0.0 - 0.5 %    Neutrophils, Absolute 5.34 1.70 - 7.00 10*3/mm3    Lymphocytes, Absolute 0.39 (L) 0.70 - 3.10 10*3/mm3    Monocytes, Absolute 0.72 0.10 - 0.90 10*3/mm3    Eosinophils, Absolute 0.22 0.00 - 0.40 10*3/mm3    Basophils, Absolute 0.04 0.00 - 0.20 10*3/mm3    Immature Grans, Absolute 0.02 0.00 - 0.05 10*3/mm3    nRBC 0.0 0.0 - 0.2 /100 WBC   Basic Metabolic Panel   Result Value Ref Range    Glucose 100 (H) 65 - 99 mg/dL    BUN 95 (H) 8 - 23 mg/dL    Creatinine 2.35 (H) 0.76 - 1.27 mg/dL    Sodium 140 136 - 145 mmol/L    Potassium 5.5 (H) 3.5 - 5.2 mmol/L    Chloride 106 98 - 107 mmol/L    CO2 21.0 (L) 22.0 - 29.0 mmol/L    Calcium 9.3 8.6 - 10.5 mg/dL    eGFR Non African Amer 28 (L) >60 mL/min/1.73    BUN/Creatinine Ratio 40.4 (H) 7.0 - 25.0    Anion Gap 13.0 5.0 - 15.0  mmol/L   CBC (No Diff)   Result Value Ref Range    WBC 5.52 3.40 - 10.80 10*3/mm3    RBC 3.06 (L) 4.14 - 5.80 10*6/mm3    Hemoglobin 10.0 (L) 13.0 - 17.7 g/dL    Hematocrit 29.5 (L) 37.5 - 51.0 %    MCV 96.4 79.0 - 97.0 fL    MCH 32.7 26.6 - 33.0 pg    MCHC 33.9 31.5 - 35.7 g/dL    RDW 13.1 12.3 - 15.4 %    RDW-SD 46.0 37.0 - 54.0 fl    MPV 9.8 6.0 - 12.0 fL    Platelets 145 140 - 450 10*3/mm3   Troponin   Result Value Ref Range    Troponin T 0.036 (C) 0.000 - 0.030 ng/mL   Troponin   Result Value Ref Range    Troponin T 0.030 0.000 - 0.030 ng/mL   Cortisol   Result Value Ref Range    Cortisol 11.55   mcg/dL   Renal Function Panel   Result Value Ref Range    Glucose 105 (H) 65 - 99 mg/dL    BUN 50 (H) 8 - 23 mg/dL    Creatinine 1.12 0.76 - 1.27 mg/dL    Sodium 142 136 - 145 mmol/L    Potassium 5.7 (H) 3.5 - 5.2 mmol/L    Chloride 110 (H) 98 - 107 mmol/L    CO2 21.3 (L) 22.0 - 29.0 mmol/L    Calcium 9.4 8.6 - 10.5 mg/dL    Albumin 3.60 3.50 - 5.20 g/dL    Phosphorus 4.2 2.5 - 4.5 mg/dL    Anion Gap 10.7 5.0 - 15.0 mmol/L    BUN/Creatinine Ratio 44.6 (H) 7.0 - 25.0    eGFR Non African Amer 65 >60 mL/min/1.73   Procalcitonin   Result Value Ref Range    Procalcitonin 0.12 0.00 - 0.25 ng/mL   CBC Auto Differential   Result Value Ref Range    WBC 4.63 3.40 - 10.80 10*3/mm3    RBC 3.04 (L) 4.14 - 5.80 10*6/mm3    Hemoglobin 10.0 (L) 13.0 - 17.7 g/dL    Hematocrit 29.5 (L) 37.5 - 51.0 %    MCV 97.0 79.0 - 97.0 fL    MCH 32.9 26.6 - 33.0 pg    MCHC 33.9 31.5 - 35.7 g/dL    RDW 13.2 12.3 - 15.4 %    RDW-SD 47.1 37.0 - 54.0 fl    MPV 10.0 6.0 - 12.0 fL    Platelets 152 140 - 450 10*3/mm3    Neutrophil % 86.0 (H) 42.7 - 76.0 %    Lymphocyte % 7.6 (L) 19.6 - 45.3 %    Monocyte % 5.8 5.0 - 12.0 %    Eosinophil % 0.2 (L) 0.3 - 6.2 %    Basophil % 0.0 0.0 - 1.5 %    Immature Grans % 0.4 0.0 - 0.5 %    Neutrophils, Absolute 3.98 1.70 - 7.00 10*3/mm3    Lymphocytes, Absolute 0.35 (L) 0.70 - 3.10 10*3/mm3    Monocytes, Absolute 0.27  0.10 - 0.90 10*3/mm3    Eosinophils, Absolute 0.01 0.00 - 0.40 10*3/mm3    Basophils, Absolute 0.00 0.00 - 0.20 10*3/mm3    Immature Grans, Absolute 0.02 0.00 - 0.05 10*3/mm3    nRBC 0.0 0.0 - 0.2 /100 WBC   Basic Metabolic Panel   Result Value Ref Range    Glucose 95 65 - 99 mg/dL    BUN 41 (H) 8 - 23 mg/dL    Creatinine 0.90 0.76 - 1.27 mg/dL    Sodium 143 136 - 145 mmol/L    Potassium 4.7 3.5 - 5.2 mmol/L    Chloride 112 (H) 98 - 107 mmol/L    CO2 19.4 (L) 22.0 - 29.0 mmol/L    Calcium 8.6 8.6 - 10.5 mg/dL    eGFR Non African Amer 84 >60 mL/min/1.73    BUN/Creatinine Ratio 45.6 (H) 7.0 - 25.0    Anion Gap 11.6 5.0 - 15.0 mmol/L   Basic Metabolic Panel   Result Value Ref Range    Glucose 94 65 - 99 mg/dL    BUN 36 (H) 8 - 23 mg/dL    Creatinine 0.91 0.76 - 1.27 mg/dL    Sodium 140 136 - 145 mmol/L    Potassium 5.2 3.5 - 5.2 mmol/L    Chloride 107 98 - 107 mmol/L    CO2 22.3 22.0 - 29.0 mmol/L    Calcium 9.5 8.6 - 10.5 mg/dL    eGFR Non African Amer 83 >60 mL/min/1.73    BUN/Creatinine Ratio 39.6 (H) 7.0 - 25.0    Anion Gap 10.7 5.0 - 15.0 mmol/L       The following portions of the patient's history were reviewed and updated as appropriate: allergies, current medications, past family history, past medical history, past social history, past surgical history and problem list.      he has a history of   Patient Active Problem List   Diagnosis   • Hypertension   • Anxiety   • Impaired vision   • Impaired fasting glucose   • Blindness of both eyes   • Diverticulitis of intestine with perforation and abscess   • AAA (abdominal aortic aneurysm, ruptured) (CMS/[facility])   • Ruptured abdominal aortic aneurysm (AAA) (CMS/[facility])   • Morbidly obese (CMS/[facility])   • Hypotension   • MARYBETH (acute kidney injury) (CMS/HCC)   • Elevated troponin   • Hyperkalemia   • Cancer (CMS/HCC)   • Nausea & vomiting   • G tube feedings (CMS/HCC)       Review of Systems   Constitutional: Negative for activity change, appetite change and unexpected weight  change.   Eyes: Negative for visual disturbance.   Respiratory: Negative for chest tightness and shortness of breath.    Cardiovascular: Negative for chest pain and palpitations.   Skin: Negative for color change.   Neurological: Negative for syncope and speech difficulty.   Psychiatric/Behavioral: Negative for confusion and decreased concentration.   All other systems reviewed and are negative.      Objective   Physical Exam   Constitutional: He is oriented to person, place, and time. He appears well-developed and well-nourished. No distress.   HENT:   Head: Normocephalic.   Right Ear: External ear normal.   Left Ear: External ear normal.   S/p oral cancer surgery   Eyes: Pupils are equal, round, and reactive to light. EOM are normal.   Neck: Normal range of motion. Neck supple. No thyromegaly present.   Cardiovascular: Normal rate and regular rhythm.   Pulmonary/Chest: Effort normal and breath sounds normal. No respiratory distress.   Abdominal: Soft.   Musculoskeletal: Normal range of motion.   Neurological: He is alert and oriented to person, place, and time.   Skin: Skin is warm and dry. He is not diaphoretic.   Psychiatric: He has a normal mood and affect. His behavior is normal.   Nursing note and vitals reviewed.      Assessment/Plan   Darryn was seen today for hypotension.    Diagnoses and all orders for this visit:    Essential hypertension    Cancer (CMS/HCC)    Impaired fasting glucose    Impaired vision    Other orders  -     desoximetasone (TOPICORT) 0.25 % cream; Apply  topically to the appropriate area as directed 2 (Two) Times a Day.  -     Discontinue: neomycin-polymyxin-hydrocortisone (CORTISPORIN) 3.5-29108-5 otic suspension; Administer 3 drops into both ears 3 (Three) Times a Day.  -     silver sulfadiazine (SILVADENE, SSD) 1 % cream; Apply  topically to the appropriate area as directed Daily.  -     nicotine (Nicotine Step 1) 21 MG/24HR patch; Place 1 patch on the skin as directed by provider  Daily for 28 days.  -     pantoprazole (PROTONIX) 40 MG EC tablet; Take 1 tablet by mouth 2 (two) times a day for 90 days.  -     neomycin-polymyxin-hydrocortisone (CORTISPORIN) 3.5-05471-8 otic suspension; Administer 3 drops into both ears 3 (Three) Times a Day.        Labs results discussed in detail with the patient, if no recent labs were done, order placed today.  Plan to update vaccines if needed today.  I  reviewed health maintenance with the patient as part of my preventative care plan. I discussed preventative counseling with the patient in detail.

## 2020-08-05 ENCOUNTER — READMISSION MANAGEMENT (OUTPATIENT)
Dept: CALL CENTER | Facility: HOSPITAL | Age: 65
End: 2020-08-05

## 2020-08-05 NOTE — OUTREACH NOTE
Medical Week 3 Survey      Responses   [facility]   COVID-19 Test Status  Not tested   Does the patient have one of the following disease processes/diagnoses(primary or secondary)?  Other   Week 3 attempt successful?  Yes   Call start time  1034   Call end time  1042   General alerts for this patient  Blindness of both eyes   Discharge diagnosis   MARYBETH (acute kidney injury),    Nausea & vomiting,    Hyperkalemia,    Cancer, head and neck,    G tube feedings      Is patient permission given to speak with other caregiver?  No   Meds reviewed with patient/caregiver?  Yes   Is the patient having any side effects they believe may be caused by any medication additions or changes?  No   Does the patient have all medications ordered at discharge?  Yes   Is the patient taking all medications as directed (includes completed medication regime)?  Yes   Does the patient have a primary care provider?   Yes   Comments regarding PCP  PCP Dr Nielsen. Hospital f/u schedule 8/3/20   Does the patient have an appointment with their PCP within 7 days of discharge?  Yes   Has the patient kept scheduled appointments due by today?  Yes   Comments  He saw Dr. Banegas.   Has home health visited the patient within 72 hours of discharge?  N/A   DME comments  He is doing well with his tube feedings and has what he needs.    Pulse Ox monitoring  None   Comments  He is doing well, Advised of the Call center phone number if need assist.   Did the patient receive a copy of their discharge instructions?  Yes   Nursing interventions  Reviewed instructions with patient   What is the patient's perception of their health status since discharge?  Improving   Is the patient/caregiver able to teach back signs and symptoms related to disease process for when to call PCP?  Yes   Is the patient/caregiver able to teach back signs and symptoms related to disease process for when to call 911?  Yes   Is the  patient/caregiver able to teach back the hierarchy of who to call/visit for symptoms/problems? PCP, Specialist, Home health nurse, Urgent Care, ED, 911  Yes   Additional teach back comments  He is feeling much better.   Week 3 Call Completed?  Yes          Britney Romero RN

## 2020-08-07 ENCOUNTER — TELEPHONE (OUTPATIENT)
Dept: FAMILY MEDICINE CLINIC | Facility: CLINIC | Age: 65
End: 2020-08-07

## 2020-08-07 RX ORDER — TRIAMCINOLONE ACETONIDE 5 MG/G
OINTMENT TOPICAL 2 TIMES DAILY
Qty: 1 TUBE | Refills: 2 | Status: SHIPPED | OUTPATIENT
Start: 2020-08-07 | End: 2021-02-08 | Stop reason: SDUPTHER

## 2020-08-07 NOTE — TELEPHONE ENCOUNTER
Received request from [facility] to change betamethasone dp 0.05% cream to triamcinolone 0.5% cream - apply topically to affected area BID.  Ok to order per Dr. Feliciano.

## 2020-08-11 ENCOUNTER — EPISODE CHANGES (OUTPATIENT)
Dept: CASE MANAGEMENT | Facility: OTHER | Age: 65
End: 2020-08-11

## 2020-08-17 ENCOUNTER — HOSPITAL ENCOUNTER (OUTPATIENT)
Dept: CT IMAGING | Facility: HOSPITAL | Age: 65
Discharge: HOME | End: 2020-08-17

## 2020-08-17 DIAGNOSIS — I71.40 ABDOMINAL AORTIC ANEURYSM (AAA) WITHOUT RUPTURE (HCC): ICD-10-CM

## 2020-08-17 LAB — CREAT BLDA-MCNC: 0.9 MG/DL (ref 0.6–1.3)

## 2020-08-17 PROCEDURE — 82565 ASSAY OF CREATININE: CPT

## 2020-08-17 PROCEDURE — 74174 CTA ABD&PLVS W/CONTRAST: CPT

## 2020-08-17 PROCEDURE — 0 IOPAMIDOL PER 1 ML: Performed by: SURGERY

## 2020-08-17 RX ADMIN — IOPAMIDOL 100 ML: 755 INJECTION, SOLUTION INTRAVENOUS at 11:53

## 2020-09-16 ENCOUNTER — PATIENT OUTREACH (OUTPATIENT)
Dept: CASE MANAGEMENT | Facility: OTHER | Age: 65
End: 2020-09-16

## 2020-09-16 ENCOUNTER — EPISODE CHANGES (OUTPATIENT)
Dept: CASE MANAGEMENT | Facility: OTHER | Age: 65
End: 2020-09-16

## 2020-09-16 NOTE — OUTREACH NOTE
Care Plan Note      Responses   Lifestyle Goals  Routine follow-up with doctor(s), Eat a healthy diet, Have more energy, Medication management   Barriers  Side effects of medication [Patient recovering from radiation treatment. ]   Self Management  Medication Adherence   Annual Wellness Visit:   Patient Will Schedule   AWV Materials  Send Materials   Care Gaps Addressed  -- [Patient states he is not diabetic. ]   Specific Disease Process Teaching  -- [Continue to increase calorie intake. Follow up with PCP for AWV soon. ]   Advanced Directives:  Patient Has [Recommended patient bring documents to PCP to be entered into EPIC. Patient verbalized understanding. ]   Ed Visits past 12 months:  None   Hospitalizations past 12 months  2 or 3   Discharge destination:  Home   Medication Adherence  Medications understood   Goal Progress  Making Progress Toward Goal(s)   Readiness Scale  7   Confidence Scale  7   Health Literacy  Good        The main concerns and/or symptoms the patient would like to address are: Spoke with patient regarding health and wellness. Patient states he is doing well. He is recovering from radiation treatment and currently has a feeding tube. Patient is attempting to increase his calorie intake to be able to remove the feeding tube. No concerns or symptoms expressed requiring ACM support.    Education/instruction provided by Care Coordinator: Introduced self, explained ACM RN role and provided contact information. Patient states he is recovering post radiation treatment. ACM recommended patient schedule an AWV in the near future. Patient verbalized understanding. AWV information sent to patient via mail. AD reviewed with patient. Patient states his significant other is his health care surrogate and he has living will documents. ACM encouraged patient to bring the Living Will to his PCP to be entered into Epic. MyChart active. No further needs at this time. Patient very appreciative of the outreach.      Follow Up Outreach Due: as needed    Ashley Guerra RN  Ambulatory     9/16/2020, 11:49 EDT

## 2021-01-25 ENCOUNTER — TELEPHONE (OUTPATIENT)
Dept: FAMILY MEDICINE CLINIC | Facility: CLINIC | Age: 66
End: 2021-01-25

## 2021-01-25 DIAGNOSIS — R73.01 IMPAIRED FASTING GLUCOSE: ICD-10-CM

## 2021-01-25 DIAGNOSIS — N17.9 AKI (ACUTE KIDNEY INJURY) (HCC): ICD-10-CM

## 2021-01-25 DIAGNOSIS — E87.5 HYPERKALEMIA: ICD-10-CM

## 2021-01-25 DIAGNOSIS — I10 ESSENTIAL HYPERTENSION: Primary | ICD-10-CM

## 2021-01-28 RX ORDER — PANTOPRAZOLE SODIUM 40 MG/1
40 TABLET, DELAYED RELEASE ORAL DAILY
Qty: 30 TABLET | Refills: 0 | Status: SHIPPED | OUTPATIENT
Start: 2021-01-28 | End: 2021-02-08 | Stop reason: SDUPTHER

## 2021-02-05 LAB
ALBUMIN SERPL-MCNC: 4.5 G/DL (ref 3.8–4.8)
ALBUMIN/GLOB SERPL: 2 {RATIO} (ref 1.2–2.2)
ALP SERPL-CCNC: 68 IU/L (ref 39–117)
ALT SERPL-CCNC: 8 IU/L (ref 0–44)
AST SERPL-CCNC: 17 IU/L (ref 0–40)
BASOPHILS # BLD AUTO: 0.1 X10E3/UL (ref 0–0.2)
BASOPHILS NFR BLD AUTO: 1 %
BILIRUB SERPL-MCNC: 0.5 MG/DL (ref 0–1.2)
BUN SERPL-MCNC: 13 MG/DL (ref 8–27)
BUN/CREAT SERPL: 15 (ref 10–24)
CALCIUM SERPL-MCNC: 9.9 MG/DL (ref 8.6–10.2)
CHLORIDE SERPL-SCNC: 101 MMOL/L (ref 96–106)
CHOLEST SERPL-MCNC: 112 MG/DL (ref 100–199)
CO2 SERPL-SCNC: 26 MMOL/L (ref 20–29)
CREAT SERPL-MCNC: 0.84 MG/DL (ref 0.76–1.27)
EOSINOPHIL # BLD AUTO: 0.4 X10E3/UL (ref 0–0.4)
EOSINOPHIL NFR BLD AUTO: 8 %
ERYTHROCYTE [DISTWIDTH] IN BLOOD BY AUTOMATED COUNT: 12.6 % (ref 11.6–15.4)
GLOBULIN SER CALC-MCNC: 2.2 G/DL (ref 1.5–4.5)
GLUCOSE SERPL-MCNC: 106 MG/DL (ref 65–99)
HBA1C MFR BLD: 5.7 % (ref 4.8–5.6)
HCT VFR BLD AUTO: 39.4 % (ref 37.5–51)
HDLC SERPL-MCNC: 50 MG/DL
HGB BLD-MCNC: 13.3 G/DL (ref 13–17.7)
IMM GRANULOCYTES # BLD AUTO: 0 X10E3/UL (ref 0–0.1)
IMM GRANULOCYTES NFR BLD AUTO: 0 %
LDLC SERPL CALC-MCNC: 48 MG/DL (ref 0–99)
LYMPHOCYTES # BLD AUTO: 0.7 X10E3/UL (ref 0.7–3.1)
LYMPHOCYTES NFR BLD AUTO: 13 %
MCH RBC QN AUTO: 34.5 PG (ref 26.6–33)
MCHC RBC AUTO-ENTMCNC: 33.8 G/DL (ref 31.5–35.7)
MCV RBC AUTO: 102 FL (ref 79–97)
MONOCYTES # BLD AUTO: 0.5 X10E3/UL (ref 0.1–0.9)
MONOCYTES NFR BLD AUTO: 9 %
NEUTROPHILS # BLD AUTO: 3.5 X10E3/UL (ref 1.4–7)
NEUTROPHILS NFR BLD AUTO: 69 %
PLATELET # BLD AUTO: 178 X10E3/UL (ref 150–450)
POTASSIUM SERPL-SCNC: 5.9 MMOL/L (ref 3.5–5.2)
PROT SERPL-MCNC: 6.7 G/DL (ref 6–8.5)
RBC # BLD AUTO: 3.86 X10E6/UL (ref 4.14–5.8)
SODIUM SERPL-SCNC: 144 MMOL/L (ref 134–144)
TRIGL SERPL-MCNC: 63 MG/DL (ref 0–149)
VLDLC SERPL CALC-MCNC: 14 MG/DL (ref 5–40)
WBC # BLD AUTO: 5.1 X10E3/UL (ref 3.4–10.8)

## 2021-02-08 ENCOUNTER — OFFICE VISIT (OUTPATIENT)
Dept: FAMILY MEDICINE CLINIC | Facility: CLINIC | Age: 66
End: 2021-02-08

## 2021-02-08 VITALS
HEART RATE: 74 BPM | WEIGHT: 170 LBS | DIASTOLIC BLOOD PRESSURE: 74 MMHG | TEMPERATURE: 97.1 F | BODY MASS INDEX: 25.18 KG/M2 | SYSTOLIC BLOOD PRESSURE: 112 MMHG | RESPIRATION RATE: 16 BRPM | HEIGHT: 69 IN | OXYGEN SATURATION: 98 %

## 2021-02-08 DIAGNOSIS — E87.5 SERUM POTASSIUM ELEVATED: ICD-10-CM

## 2021-02-08 DIAGNOSIS — I10 ESSENTIAL HYPERTENSION: Primary | ICD-10-CM

## 2021-02-08 PROCEDURE — 99214 OFFICE O/P EST MOD 30 MIN: CPT | Performed by: FAMILY MEDICINE

## 2021-02-08 RX ORDER — TRIAMCINOLONE ACETONIDE 5 MG/G
OINTMENT TOPICAL 2 TIMES DAILY
Qty: 15 G | Refills: 2 | Status: SHIPPED | OUTPATIENT
Start: 2021-02-08 | End: 2021-10-04

## 2021-02-08 RX ORDER — ALBUTEROL SULFATE 90 UG/1
2 AEROSOL, METERED RESPIRATORY (INHALATION) EVERY 4 HOURS PRN
Qty: 8 G | Refills: 2 | Status: SHIPPED | OUTPATIENT
Start: 2021-02-08 | End: 2022-06-28 | Stop reason: SDUPTHER

## 2021-02-08 RX ORDER — ASPIRIN 325 MG
325 TABLET ORAL DAILY
COMMUNITY
End: 2021-10-04

## 2021-02-08 RX ORDER — MULTIPLE VITAMINS W/ MINERALS TAB 9MG-400MCG
1 TAB ORAL DAILY
COMMUNITY
End: 2022-03-21

## 2021-02-08 RX ORDER — PANTOPRAZOLE SODIUM 40 MG/1
40 TABLET, DELAYED RELEASE ORAL 2 TIMES DAILY
Qty: 180 TABLET | Refills: 1 | Status: SHIPPED | OUTPATIENT
Start: 2021-02-08 | End: 2021-08-09

## 2021-02-08 NOTE — PROGRESS NOTES
Subjective   Chief Complaint:   Chief Complaint   Patient presents with   • Diverticulitis   • Impaired fasting glucose      CANCER OF TONGUE AND S/P RADIATION THERAPY.     TOBACCO ABUSE     1/2 PPD    History of Present Illness PATIENT IS S/P RADIATION THERAPY  FOR CANCER OF TONGUE.  HAD THERAPY AND DOING GOOD' I REVIEWED HIS HISTORY AND MEDS.  DOING WELL. FINISHED WITH  RADIATION THERAPY  AS OF NOW. HE IS DOING WELL AND SEEING SPECIALIST. WE DISCUSSED  meds   AND TREATMENT. CONTINUE TO SEE ME  AS NEEDED. HE IS DOING WELL. STILL SEEING DR DUMAS AND DR HODGES .  Weight stable. Still   Using feeding tube.  Continue same  Bmp ordered  For this week      Past Medical History:   Diagnosis Date   • Abscess     COLONIC   • Arthritis    • Cancer (CMS/HCC)    • Floaters     VISION BETTER IN RIGHT VS LEFT   • Gastric reflux     ON OCCASION   • History of cellulitis     LEFT ANKLE ABOUT 10 YRS AGO   • History of migraine     VERY LIGHT SENSITIVE   • Hypertension    • Lumbar herniated disc    • RLF (retrolental fibroplasia)     SINCE CHILDHOOD   • Vision impairment         Darryn Baxter 65 y.o. male who presents today for Medical Management of the below listed issues and medication refills.    ICD-10-CM ICD-9-CM   1. Essential hypertension  I10 401.9   2. Serum potassium elevated  E87.5 276.7        he has a problem list of   Patient Active Problem List   Diagnosis   • Hypertension   • Anxiety   • Impaired vision   • Impaired fasting glucose   • Blindness of both eyes   • Diverticulitis of intestine with perforation and abscess   • AAA (abdominal aortic aneurysm, ruptured) (CMS/HCC)   • Ruptured abdominal aortic aneurysm (AAA) (CMS/HCC)   • Morbidly obese (CMS/HCC)   • Hypotension   • MARYBETH (acute kidney injury) (CMS/HCC)   • Elevated troponin   • Hyperkalemia   • Cancer (CMS/HCC)   • Nausea & vomiting   • G tube feedings (CMS/HCC)   .    he has been compliant with   Current Outpatient Medications:   •  albuterol sulfate   "(90 Base) MCG/ACT inhaler, Inhale 2 puffs Every 4 (Four) Hours As Needed for Wheezing., Disp: 8 g, Rfl: 2  •  aspirin 325 MG tablet, Take 325 mg by mouth Daily. Take 2 tablets daily, Disp: , Rfl:   •  atorvastatin (LIPITOR) 20 MG tablet, Take 1 tablet by mouth Every Evening., Disp: 30 tablet, Rfl: 3  •  calcium polycarbophil (FIBERCON) 625 MG tablet, Take 625 mg by mouth Daily., Disp: , Rfl:   •  desoximetasone (TOPICORT) 0.25 % cream, Apply  topically to the appropriate area as directed 2 (Two) Times a Day., Disp: 1 each, Rfl: 3  •  multivitamin with minerals (MULTI FOR HIM 50+ PO), Take 1 tablet by mouth Daily., Disp: , Rfl:   •  mupirocin (BACTROBAN) 2 % ointment, USE AS DIRECTED BY NASAL ROUTE 4 TIMES DAILY FOR TWO WEEKS, Disp: , Rfl:   •  neomycin-polymyxin-hydrocortisone (CORTISPORIN) 3.5-27599-6 otic suspension, Administer 3 drops into both ears 3 (Three) Times a Day., Disp: 10 mL, Rfl: 1  •  OXYCODONE HCL PO, Take 10 mg by mouth Every 4 (Four) Hours While Awake., Disp: , Rfl:   •  pantoprazole (PROTONIX) 40 MG EC tablet, Take 1 tablet by mouth 2 (Two) Times a Day., Disp: 180 tablet, Rfl: 1  •  silver sulfadiazine (SILVADENE, SSD) 1 % cream, Apply  topically to the appropriate area as directed Daily., Disp: 25 g, Rfl: 2  •  triamcinolone (KENALOG) 0.5 % ointment, Apply  topically to the appropriate area as directed 2 (Two) Times a Day., Disp: 15 g, Rfl: 2  •  vitamin C (ASCORBIC ACID) 500 MG tablet, Take 1,000 mg by mouth Daily., Disp: , Rfl: .  he denies medication side effects.        /74   Pulse 74   Temp 97.1 °F (36.2 °C)   Resp 16   Ht 175.3 cm (69.02\")   Wt 77.1 kg (170 lb)   SpO2 98%   BMI 25.09 kg/m²     Results for orders placed or performed in visit on 01/25/21   Comprehensive metabolic panel    Specimen: Blood   Result Value Ref Range    Glucose 106 (H) 65 - 99 mg/dL    BUN 13 8 - 27 mg/dL    Creatinine 0.84 0.76 - 1.27 mg/dL    eGFR Non African Am 91 >59 mL/min/1.73    eGFR African Am " 105 >59 mL/min/1.73    BUN/Creatinine Ratio 15 10 - 24    Sodium 144 134 - 144 mmol/L    Potassium 5.9 (H) 3.5 - 5.2 mmol/L    Chloride 101 96 - 106 mmol/L    Total CO2 26 20 - 29 mmol/L    Calcium 9.9 8.6 - 10.2 mg/dL    Total Protein 6.7 6.0 - 8.5 g/dL    Albumin 4.5 3.8 - 4.8 g/dL    Globulin 2.2 1.5 - 4.5 g/dL    A/G Ratio 2.0 1.2 - 2.2    Total Bilirubin 0.5 0.0 - 1.2 mg/dL    Alkaline Phosphatase 68 39 - 117 IU/L    AST (SGOT) 17 0 - 40 IU/L    ALT (SGPT) 8 0 - 44 IU/L   Lipid panel    Specimen: Blood   Result Value Ref Range    Total Cholesterol 112 100 - 199 mg/dL    Triglycerides 63 0 - 149 mg/dL    HDL Cholesterol 50 >39 mg/dL    VLDL Cholesterol Noah 14 5 - 40 mg/dL    LDL Chol Calc (NIH) 48 0 - 99 mg/dL   Hemoglobin A1c    Specimen: Blood   Result Value Ref Range    Hemoglobin A1C 5.7 (H) 4.8 - 5.6 %   CBC & Differential    Specimen: Blood   Result Value Ref Range    WBC 5.1 3.4 - 10.8 x10E3/uL    RBC 3.86 (L) 4.14 - 5.80 x10E6/uL    Hemoglobin 13.3 13.0 - 17.7 g/dL    Hematocrit 39.4 37.5 - 51.0 %     (H) 79 - 97 fL    MCH 34.5 (H) 26.6 - 33.0 pg    MCHC 33.8 31.5 - 35.7 g/dL    RDW 12.6 11.6 - 15.4 %    Platelets 178 150 - 450 x10E3/uL    Neutrophil Rel % 69 Not Estab. %    Lymphocyte Rel % 13 Not Estab. %    Monocyte Rel % 9 Not Estab. %    Eosinophil Rel % 8 Not Estab. %    Basophil Rel % 1 Not Estab. %    Neutrophils Absolute 3.5 1.4 - 7.0 x10E3/uL    Lymphocytes Absolute 0.7 0.7 - 3.1 x10E3/uL    Monocytes Absolute 0.5 0.1 - 0.9 x10E3/uL    Eosinophils Absolute 0.4 0.0 - 0.4 x10E3/uL    Basophils Absolute 0.1 0.0 - 0.2 x10E3/uL    Immature Granulocyte Rel % 0 Not Estab. %    Immature Grans Absolute 0.0 0.0 - 0.1 x10E3/uL       The following portions of the patient's history were reviewed and updated as appropriate: allergies, current medications, past family history, past medical history, past social history, past surgical history and problem list.      he has a history of   Patient Active  Problem List   Diagnosis   • Hypertension   • Anxiety   • Impaired vision   • Impaired fasting glucose   • Blindness of both eyes   • Diverticulitis of intestine with perforation and abscess   • AAA (abdominal aortic aneurysm, ruptured) (CMS/[facility])   • Ruptured abdominal aortic aneurysm (AAA) (CMS/HCC)   • Morbidly obese (CMS/[facility])   • Hypotension   • MARYBETH (acute kidney injury) (CMS/[facility])   • Elevated troponin   • Hyperkalemia   • Cancer (CMS/[facility])   • Nausea & vomiting   • G tube feedings (CMS/[facility])       Review of Systems   Constitutional: Negative for fatigue and fever.   HENT: Positive for dental problem. Negative for ear pain and sinus pressure.    Eyes: Negative for pain and discharge.   Respiratory: Negative for chest tightness.    Cardiovascular: Negative for chest pain.   Gastrointestinal: Negative for abdominal pain.   Genitourinary: Negative for frequency.   Musculoskeletal: Negative for back pain.   Neurological: Negative for weakness and headaches.   Psychiatric/Behavioral: The patient is not nervous/anxious.        Objective   Physical Exam  Constitutional:       Appearance: He is normal weight.   HENT:      Nose: No congestion.   Eyes:      Extraocular Movements: Extraocular movements intact.   Neck:      Musculoskeletal: Normal range of motion and neck supple.   Cardiovascular:      Rate and Rhythm: Normal rate and regular rhythm.      Pulses: Normal pulses.      Heart sounds: Normal heart sounds.   Musculoskeletal: Normal range of motion.         General: No swelling or tenderness.   Neurological:      General: No focal deficit present.      Mental Status: He is alert.   Psychiatric:         Mood and Affect: Mood normal.         Behavior: Behavior normal.         Thought Content: Thought content normal.         Judgment: Judgment normal.         Assessment/Plan   Diagnoses and all orders for this visit:    1. Essential hypertension (Primary)    2. Serum potassium elevated  -     Basic metabolic  panel    Other orders  -     albuterol sulfate  (90 Base) MCG/ACT inhaler; Inhale 2 puffs Every 4 (Four) Hours As Needed for Wheezing.  Dispense: 8 g; Refill: 2  -     pantoprazole (PROTONIX) 40 MG EC tablet; Take 1 tablet by mouth 2 (Two) Times a Day.  Dispense: 180 tablet; Refill: 1  -     triamcinolone (KENALOG) 0.5 % ointment; Apply  topically to the appropriate area as directed 2 (Two) Times a Day.  Dispense: 15 g; Refill: 2      Labs results discussed in detail with the patient, if no recent labs were done, order placed today.  Plan to update vaccines if needed today.  I  reviewed health maintenance with the patient as part of my preventative care plan. I discussed preventative counseling with the patient in detail.

## 2021-02-22 RX ORDER — PANTOPRAZOLE SODIUM 40 MG/1
TABLET, DELAYED RELEASE ORAL
Qty: 30 TABLET | OUTPATIENT
Start: 2021-02-22

## 2021-03-16 ENCOUNTER — BULK ORDERING (OUTPATIENT)
Dept: CASE MANAGEMENT | Facility: OTHER | Age: 66
End: 2021-03-16

## 2021-03-16 DIAGNOSIS — Z23 IMMUNIZATION DUE: ICD-10-CM

## 2021-03-23 ENCOUNTER — IMMUNIZATION (OUTPATIENT)
Dept: VACCINE CLINIC | Facility: HOSPITAL | Age: 66
End: 2021-03-23

## 2021-03-23 DIAGNOSIS — Z23 IMMUNIZATION DUE: ICD-10-CM

## 2021-03-23 PROCEDURE — 0001A: CPT | Performed by: INTERNAL MEDICINE

## 2021-03-23 PROCEDURE — 91300 HC SARSCOV02 VAC 30MCG/0.3ML IM: CPT | Performed by: INTERNAL MEDICINE

## 2021-04-13 ENCOUNTER — IMMUNIZATION (OUTPATIENT)
Dept: VACCINE CLINIC | Facility: HOSPITAL | Age: 66
End: 2021-04-13

## 2021-04-13 PROCEDURE — 91300 HC SARSCOV02 VAC 30MCG/0.3ML IM: CPT | Performed by: INTERNAL MEDICINE

## 2021-04-13 PROCEDURE — 0002A: CPT | Performed by: INTERNAL MEDICINE

## 2021-08-09 RX ORDER — PANTOPRAZOLE SODIUM 40 MG/1
TABLET, DELAYED RELEASE ORAL
Qty: 180 TABLET | Refills: 0 | Status: SHIPPED | OUTPATIENT
Start: 2021-08-09 | End: 2021-10-11

## 2021-09-15 ENCOUNTER — TELEPHONE (OUTPATIENT)
Dept: FAMILY MEDICINE CLINIC | Facility: CLINIC | Age: 66
End: 2021-09-15

## 2021-09-15 NOTE — TELEPHONE ENCOUNTER
Caller: Darryn Baxter    Relationship to patient: Self    Best call back number: [phone]    Chief complaint:     Type of visit: OFF BOARDING / ON BOARDING      Requested date: ANYTIME    If rescheduling, when is the original appointment:     Additional notes: PATIENT CALLED IN AND WANTED TO KNOW IF THERE IS ANY WAY THAT THE PATIENT AND HIS WIFE CAN COME IN AT THE SAME TIME? HE SAID THAT WITH HIS BLINDNESS AND HER BEING THE ONLY , IT WOULD BE EASIER FOR THEM BOTH TO COME IN AT THE SAME TIME. PLEASE CALL PATIENT AND ADVISE.

## 2021-10-04 ENCOUNTER — OFFICE VISIT (OUTPATIENT)
Dept: FAMILY MEDICINE CLINIC | Facility: CLINIC | Age: 66
End: 2021-10-04

## 2021-10-04 VITALS
TEMPERATURE: 98.9 F | SYSTOLIC BLOOD PRESSURE: 138 MMHG | OXYGEN SATURATION: 93 % | HEART RATE: 75 BPM | HEIGHT: 69 IN | RESPIRATION RATE: 16 BRPM | WEIGHT: 169 LBS | DIASTOLIC BLOOD PRESSURE: 86 MMHG | BODY MASS INDEX: 25.03 KG/M2

## 2021-10-04 DIAGNOSIS — E78.49 OTHER HYPERLIPIDEMIA: ICD-10-CM

## 2021-10-04 DIAGNOSIS — H54.7 IMPAIRED VISION: ICD-10-CM

## 2021-10-04 DIAGNOSIS — R73.01 IMPAIRED FASTING GLUCOSE: ICD-10-CM

## 2021-10-04 DIAGNOSIS — R35.1 NOCTURIA: ICD-10-CM

## 2021-10-04 DIAGNOSIS — I10 PRIMARY HYPERTENSION: Primary | ICD-10-CM

## 2021-10-04 PROBLEM — R11.2 NAUSEA & VOMITING: Status: RESOLVED | Noted: 2020-07-12 | Resolved: 2021-10-04

## 2021-10-04 PROBLEM — E66.01 MORBIDLY OBESE (HCC): Status: RESOLVED | Noted: 2020-03-16 | Resolved: 2021-10-04

## 2021-10-04 PROBLEM — C02.9 TONGUE CANCER (HCC): Status: ACTIVE | Noted: 2020-07-12

## 2021-10-04 PROBLEM — G47.33 OSA (OBSTRUCTIVE SLEEP APNEA): Status: ACTIVE | Noted: 2021-10-04

## 2021-10-04 PROBLEM — I95.9 HYPOTENSION: Status: RESOLVED | Noted: 2020-07-11 | Resolved: 2021-10-04

## 2021-10-04 PROBLEM — E87.5 HYPERKALEMIA: Status: RESOLVED | Noted: 2020-07-12 | Resolved: 2021-10-04

## 2021-10-04 PROBLEM — R77.8 ELEVATED TROPONIN: Status: RESOLVED | Noted: 2020-07-12 | Resolved: 2021-10-04

## 2021-10-04 PROBLEM — Z98.890 HISTORY OF AAA (ABDOMINAL AORTIC ANEURYSM) REPAIR: Status: ACTIVE | Noted: 2020-02-21

## 2021-10-04 PROBLEM — K57.80 DIVERTICULITIS OF INTESTINE WITH PERFORATION AND ABSCESS: Status: RESOLVED | Noted: 2018-01-26 | Resolved: 2021-10-04

## 2021-10-04 PROBLEM — K21.00 GASTROESOPHAGEAL REFLUX DISEASE WITH ESOPHAGITIS WITHOUT HEMORRHAGE: Status: ACTIVE | Noted: 2021-10-04

## 2021-10-04 PROBLEM — L30.9 ECZEMA: Status: ACTIVE | Noted: 2021-10-04

## 2021-10-04 PROBLEM — I71.30 AAA (ABDOMINAL AORTIC ANEURYSM, RUPTURED): Status: RESOLVED | Noted: 2020-02-21 | Resolved: 2021-10-04

## 2021-10-04 PROBLEM — N17.9 AKI (ACUTE KIDNEY INJURY) (HCC): Status: RESOLVED | Noted: 2020-07-12 | Resolved: 2021-10-04

## 2021-10-04 PROBLEM — Z93.1 G TUBE FEEDINGS (HCC): Status: RESOLVED | Noted: 2020-07-12 | Resolved: 2021-10-04

## 2021-10-04 PROBLEM — R79.89 ELEVATED TROPONIN: Status: RESOLVED | Noted: 2020-07-12 | Resolved: 2021-10-04

## 2021-10-04 PROCEDURE — 99214 OFFICE O/P EST MOD 30 MIN: CPT | Performed by: FAMILY MEDICINE

## 2021-10-04 RX ORDER — ACETAMINOPHEN 500 MG
500 TABLET ORAL EVERY 6 HOURS PRN
COMMUNITY
End: 2022-03-21

## 2021-10-04 RX ORDER — IBUPROFEN 200 MG
200 TABLET ORAL EVERY 6 HOURS PRN
COMMUNITY
End: 2022-03-21

## 2021-10-04 NOTE — PROGRESS NOTES
"Chief Complaint  Establish Care and Hypertension    Subjective     {Problem List  Visit Diagnosis  Review (Popup)  Encounters  Notes  Medications  Labs  Result Review Imaging  Media :23}     Darryn presents to [facility] 2 valuate current conditions.  We took several minutes and reviewed his chart.  Blood pressure is controlled without medications currently.  He had a short stent of elevated blood pressure and for time was on medication.  GERD symptoms are stable with periodic use of current medication.  Plan discontinue vitamin C due to see.          Objective   Vital Signs:   Vitals:    10/04/21 1554 10/04/21 1632   BP: 146/91 138/86   BP Location:  Left arm   Patient Position:  Sitting   Cuff Size:  Adult   Pulse: 75    Resp: 16    Temp: 98.9 °F (37.2 °C)    TempSrc: Skin    SpO2: 93%    Weight: 76.7 kg (169 lb)    Height: 175.3 cm (69.02\")         Physical Exam  Vitals reviewed.   Constitutional:       General: He is not in acute distress.  Eyes:      General: Lids are normal.      Conjunctiva/sclera: Conjunctivae normal.   Neck:      Vascular: No carotid bruit.      Trachea: No tracheal deviation.   Cardiovascular:      Rate and Rhythm: Normal rate and regular rhythm.      Heart sounds: Normal heart sounds. No murmur heard.     Pulmonary:      Effort: Pulmonary effort is normal.      Breath sounds: Normal breath sounds.   Skin:     General: Skin is warm and dry.      Comments: Thickening noted left neck.   Neurological:      Mental Status: He is alert. He is not disoriented.   Psychiatric:         Attention and Perception: Attention normal.         Mood and Affect: Mood normal.         Speech: Speech normal.         Behavior: Behavior normal. Behavior is cooperative.          Result Review :                Assessment and Plan    Diagnoses and all orders for this visit:    1. Primary hypertension (Primary)  Assessment & Plan:  Hypertension is unchanged.  Continue with " low-sodium diet.  Continue with blood pressure monitoring periodically.  Currently no medication treatment needed.  Continue current treatment regimen.  Blood pressure will be reassessed at the next regular appointment.    Orders:  -     Comprehensive Metabolic Panel; Future  -     CBC & Differential; Future  -     TSH; Future    2. Other hyperlipidemia  -     Comprehensive Metabolic Panel; Future  -     CBC & Differential; Future  -     Lipid Panel With LDL / HDL Ratio; Future  -     CK; Future    3. Impaired vision    4. Nocturia  -     PSA DIAGNOSTIC; Future    5. Impaired fasting glucose  -     Comprehensive Metabolic Panel; Future  -     Hemoglobin A1c; Future  -     MicroAlbumin, Urine, Random - Urine, Clean Catch; Future      Follow Up   Return in about 5 months (around 3/16/2022) for Medicare Wellness Visit, schedule 30 min.  Patient was given instructions and counseling regarding his condition or for health maintenance advice. Please see specific information pulled into the AVS if appropriate.

## 2021-10-04 NOTE — ASSESSMENT & PLAN NOTE
Hypertension is unchanged.  Continue with low-sodium diet.  Continue with blood pressure monitoring periodically.  Currently no medication treatment needed.  Continue current treatment regimen.  Blood pressure will be reassessed at the next regular appointment.

## 2021-10-11 RX ORDER — PANTOPRAZOLE SODIUM 40 MG/1
TABLET, DELAYED RELEASE ORAL
Qty: 180 TABLET | Refills: 0 | Status: SHIPPED | OUTPATIENT
Start: 2021-10-11 | End: 2022-01-24

## 2022-01-24 RX ORDER — PANTOPRAZOLE SODIUM 40 MG/1
TABLET, DELAYED RELEASE ORAL
Qty: 180 TABLET | Refills: 0 | Status: SHIPPED | OUTPATIENT
Start: 2022-01-24 | End: 2022-03-21 | Stop reason: SDUPTHER

## 2022-03-21 ENCOUNTER — OFFICE VISIT (OUTPATIENT)
Dept: FAMILY MEDICINE CLINIC | Facility: CLINIC | Age: 67
End: 2022-03-21

## 2022-03-21 VITALS
RESPIRATION RATE: 16 BRPM | WEIGHT: 170 LBS | DIASTOLIC BLOOD PRESSURE: 76 MMHG | HEIGHT: 69 IN | BODY MASS INDEX: 25.18 KG/M2 | TEMPERATURE: 97.8 F | HEART RATE: 77 BPM | SYSTOLIC BLOOD PRESSURE: 142 MMHG | OXYGEN SATURATION: 94 %

## 2022-03-21 DIAGNOSIS — Z11.59 NEED FOR HEPATITIS C SCREENING TEST: ICD-10-CM

## 2022-03-21 DIAGNOSIS — R35.1 NOCTURIA: ICD-10-CM

## 2022-03-21 DIAGNOSIS — K21.00 GASTROESOPHAGEAL REFLUX DISEASE WITH ESOPHAGITIS WITHOUT HEMORRHAGE: ICD-10-CM

## 2022-03-21 DIAGNOSIS — Z00.00 MEDICARE ANNUAL WELLNESS VISIT, SUBSEQUENT: Primary | ICD-10-CM

## 2022-03-21 DIAGNOSIS — I10 PRIMARY HYPERTENSION: ICD-10-CM

## 2022-03-21 PROCEDURE — 1170F FXNL STATUS ASSESSED: CPT | Performed by: FAMILY MEDICINE

## 2022-03-21 PROCEDURE — 99214 OFFICE O/P EST MOD 30 MIN: CPT | Performed by: FAMILY MEDICINE

## 2022-03-21 PROCEDURE — 1125F AMNT PAIN NOTED PAIN PRSNT: CPT | Performed by: FAMILY MEDICINE

## 2022-03-21 PROCEDURE — G0439 PPPS, SUBSEQ VISIT: HCPCS | Performed by: FAMILY MEDICINE

## 2022-03-21 PROCEDURE — 1159F MED LIST DOCD IN RCRD: CPT | Performed by: FAMILY MEDICINE

## 2022-03-21 RX ORDER — AMLODIPINE BESYLATE 2.5 MG/1
2.5 TABLET ORAL NIGHTLY
Qty: 90 TABLET | Refills: 1 | Status: SHIPPED | OUTPATIENT
Start: 2022-03-21 | End: 2022-09-30

## 2022-03-21 RX ORDER — PANTOPRAZOLE SODIUM 40 MG/1
40 TABLET, DELAYED RELEASE ORAL 2 TIMES DAILY
Qty: 180 TABLET | Refills: 1 | Status: SHIPPED | OUTPATIENT
Start: 2022-03-21 | End: 2022-12-16

## 2022-03-21 NOTE — PROGRESS NOTES
The ABCs of the Annual Wellness Visit  Subsequent Medicare Wellness Visit    Chief Complaint   Patient presents with   • Medicare Wellness-subsequent      Subjective    History of Present Illness:  Darryn Baxter is a 67 y.o. male who presents for a Subsequent Medicare Wellness Visit.  He is also here to refill medicines.  Labs are due.  Systolic blood pressure is in the early stages of hypertension.  GERD symptoms stable.  Nocturia symptoms stable at 1 time a night.  No medication side effects reported with pantoprazole.    Preventative measures discussed during today's Medicare wellness visit.    The following portions of the patient's history were reviewed and   updated as appropriate: allergies, current medications, past family history, past medical history, past social history, past surgical history and problem list.    Compared to one year ago, the patient feels his physical   health is better.    Compared to one year ago, the patient feels his mental   health is better.    Recent Hospitalizations:  He was not admitted to the hospital during the last year.     Patient has been erroneously marked as diabetic. Based on the available clinical information, he does not have diabetes and should therefore be excluded from diabetic health maintenance and quality measures for the remainder of the reporting period.    Current Medical Providers:  Patient Care Team:  Darryn Johnson MD as PCP - General (Family Medicine)    Outpatient Medications Prior to Visit   Medication Sig Dispense Refill   • albuterol sulfate  (90 Base) MCG/ACT inhaler Inhale 2 puffs Every 4 (Four) Hours As Needed for Wheezing. 8 g 2   • atorvastatin (LIPITOR) 20 MG tablet Take 1 tablet by mouth Every Evening. 30 tablet 3   • calcium polycarbophil (FIBERCON) 625 MG tablet Take 625 mg by mouth Daily.     • CBD (cannabidiol) oral oil Take  by mouth.     • ibuprofen (ADVIL,MOTRIN) 200 MG tablet Take 200 mg by mouth Every 6 (Six) Hours As Needed for  "Mild Pain .     • pantoprazole (PROTONIX) 40 MG EC tablet TAKE 1 TABLET BY MOUTH TWICE A  tablet 0   • acetaminophen (TYLENOL) 500 MG tablet Take 500 mg by mouth Every 6 (Six) Hours As Needed for Mild Pain .     • desoximetasone (TOPICORT) 0.25 % cream Apply  topically to the appropriate area as directed 2 (Two) Times a Day. 1 each 3   • multivitamin with minerals tablet tablet Take 1 tablet by mouth Daily.     • neomycin-polymyxin-hydrocortisone (CORTISPORIN) 3.5-23274-8 otic suspension Administer 3 drops into both ears 3 (Three) Times a Day. 10 mL 1     No facility-administered medications prior to visit.       No opioid medication identified on active medication list. I have reviewed chart for other potential  high risk medication/s and harmful drug interactions in the elderly.          Aspirin is not on active medication list.  Aspirin use is not indicated based on review of current medical condition/s. Risk of harm outweighs potential benefits.  .    Patient Active Problem List   Diagnosis   • Primary hypertension   • Impaired vision   • Blindness of both eyes   • History of AAA (abdominal aortic aneurysm) repair   • Tongue cancer (HCC)-squamous cell   • Other hyperlipidemia   • Eczema   • Gastroesophageal reflux disease with esophagitis without hemorrhage   • Nocturia   • BARBARA (obstructive sleep apnea)     Advance Care Planning  Advance Directive is not on file.  ACP discussion was held with the patient during this visit. Patient has an advance directive (not in EMR), copy requested.          Objective    Vitals:    03/21/22 1547 03/21/22 1623   BP: 146/89 142/76   BP Location:  Right arm   Patient Position:  Sitting   Cuff Size:  Adult   Pulse: 77    Resp: 16    Temp: 97.8 °F (36.6 °C)    TempSrc: Skin    SpO2: 94%    Weight: 77.1 kg (170 lb)    Height: 175.3 cm (69.02\")    PainSc:   3      BMI Readings from Last 1 Encounters:   03/21/22 25.09 kg/m²   BMI is above normal parameters. Recommendations " include: exercise counseling and nutrition counseling    Does the patient have evidence of cognitive impairment? No    Physical Exam            HEALTH RISK ASSESSMENT    Smoking Status:  Social History     Tobacco Use   Smoking Status Current Every Day Smoker   • Packs/day: 0.25   • Years: 15.00   • Pack years: 3.75   • Types: Cigarettes   Smokeless Tobacco Never Used   Tobacco Comment    5 cigarettes     Alcohol Consumption:  Social History     Substance and Sexual Activity   Alcohol Use No    Comment: FORMER   16 YRS SOBER     Fall Risk Screen:    STEADI Fall Risk Assessment was completed, and patient is at LOW risk for falls.Assessment completed on:3/21/2022    Depression Screening:  PHQ-2/PHQ-9 Depression Screening 3/21/2022   Retired PHQ-9 Total Score -   Retired Total Score -   Little Interest or Pleasure in Doing Things 0-->not at all   Feeling Down, Depressed or Hopeless 0-->not at all   PHQ-9: Brief Depression Severity Measure Score 0       Health Habits and Functional and Cognitive Screening:  Functional & Cognitive Status 3/21/2022   Do you have difficulty preparing food and eating? No   Do you have difficulty bathing yourself, getting dressed or grooming yourself? No   Do you have difficulty using the toilet? No   Do you have difficulty moving around from place to place? No   Do you have trouble with steps or getting out of a bed or a chair? No   Current Diet Well Balanced Diet   Dental Exam Up to date   Eye Exam Up to date   Exercise (times per week) 7 times per week   Current Exercises Include Other   Do you need help using the phone?  No   Are you deaf or do you have serious difficulty hearing?  No   Do you need help with transportation? Yes   Do you need help shopping? No   Do you need help preparing meals?  No   Do you need help with housework?  No   Do you need help with laundry? No   Do you need help taking your medications? Yes   Do you need help managing money? No   Do you ever drive or ride in  a car without wearing a seat belt? No   Have you felt unusual stress, anger or loneliness in the last month? No   Who do you live with? Other   If you need help, do you have trouble finding someone available to you? No   Do you have difficulty concentrating, remembering or making decisions? Yes       Age-appropriate Screening Schedule:  Refer to the list below for future screening recommendations based on patient's age, sex and/or medical conditions. Orders for these recommended tests are listed in the plan section. The patient has been provided with a written plan.    Health Maintenance   Topic Date Due   • URINE MICROALBUMIN  Never done   • TDAP/TD VACCINES (1 - Tdap) Never done   • DIABETIC FOOT EXAM  Never done   • DIABETIC EYE EXAM  Never done   • ZOSTER VACCINE (2 of 2) 10/19/2018   • HEMOGLOBIN A1C  08/04/2021   • LIPID PANEL  02/04/2022   • INFLUENZA VACCINE  Completed              Assessment/Plan   CMS Preventative Services Quick Reference  Risk Factors Identified During Encounter  Chronic Pain arthritis in left elbow and right knee at times  Immunizations Discussed/Encouraged (specific Immunizations; Tdap and Shingrix  The above risks/problems have been discussed with the patient.  Follow up actions/plans if indicated are seen below in the Assessment/Plan Section.  Pertinent information has been shared with the patient in the After Visit Summary.    Diagnoses and all orders for this visit:    1. Medicare annual wellness visit, subsequent (Primary)  Comments:  continue with healthy diet and exercise. Immunizations discussed. Hep c due along with labs. copy of advanced directive requested.     2. Primary hypertension  Assessment & Plan:  Hypertension is unchanged.  Continue current treatment regimen.  Blood pressure will be reassessed at the next regular appointment.    Orders:  -     amLODIPine (NORVASC) 2.5 MG tablet; Take 1 tablet by mouth Every Night for 180 days.  Dispense: 90 tablet; Refill: 1    3.  Gastroesophageal reflux disease with esophagitis without hemorrhage  Assessment & Plan:  The current medical regimen is effective;  continue present plan and medications.      Orders:  -     pantoprazole (PROTONIX) 40 MG EC tablet; Take 1 tablet by mouth 2 (Two) Times a Day.  Dispense: 180 tablet; Refill: 1    4. Nocturia  Assessment & Plan:  Nocturia symptoms are stable. PSA will be monitored with annual labs.         5. Need for hepatitis C screening test  -     Hepatitis C Antibody; Future      Follow Up:   Return in about 6 months (around 9/21/2022) for recheck/refill medication.     An After Visit Summary and PPPS were made available to the patient.

## 2022-03-21 NOTE — PATIENT INSTRUCTIONS
You are due for adacel Tdap vaccination. (provides protection against tetanus, diptheria and whooping cough) Please  get the immunization at your local pharmacy at your earliest convenience. This immunization will next be due in 10 years. Please click on the link for more information about this vaccine.    [facility] Tdap Vaccine Information    You are due for Shingrix vaccination series ( the newest shingles vaccine).  It is a two shot series spaced 2-6 months apart. Please get this vaccine series started at your earliest convenience at your local pharmacy to help avoid shingles outbreak. It is more effective than the old Zostavax vaccine and is recommended even if you have had the Zostavax vaccine in the past.  Once the Shingrix series is completed, it does not need to be repeated.   For more information, please look at the website below:  [facility] Shingrix Vaccine Information      Medicare Wellness  Personal Prevention Plan of Service     Date of Office Visit:    Encounter Provider:  Darryn Johnson MD  Place of Service:  [facility]  Patient Name: Darryn Baxter  :  1955    As part of the Medicare Wellness portion of your visit today, we are providing you with this personalized preventive plan of services (PPPS). This plan is based upon recommendations of the [place] Preventive Services Task Force (USPSTF) and the Advisory Committee on Immunization Practices (ACIP).    This lists the preventive care services that should be considered, and provides dates of when you are due. Items listed as completed are up-to-date and do not require any further intervention.    Health Maintenance   Topic Date Due   • URINE MICROALBUMIN  Never done   • TDAP/TD VACCINES (1 - Tdap) Never done   • DIABETIC FOOT EXAM  Never done   • DIABETIC EYE EXAM  Never done   • ZOSTER VACCINE (2 of 2) 10/19/2018   • HEMOGLOBIN A1C  2021   • LIPID PANEL  2022   • ANNUAL WELLNESS VISIT  2023   •  COLORECTAL CANCER SCREENING  07/01/2024   • COVID-19 Vaccine  Completed   • INFLUENZA VACCINE  Completed   • Pneumococcal Vaccine 65+  Completed   • AAA SCREEN (ONE-TIME)  Completed   • HEPATITIS C SCREENING  Discontinued       No orders of the defined types were placed in this encounter.      No follow-ups on file.

## 2022-04-04 ENCOUNTER — LAB (OUTPATIENT)
Dept: LAB | Facility: HOSPITAL | Age: 67
End: 2022-04-04

## 2022-04-04 PROCEDURE — 80053 COMPREHEN METABOLIC PANEL: CPT | Performed by: FAMILY MEDICINE

## 2022-04-04 PROCEDURE — 82550 ASSAY OF CK (CPK): CPT | Performed by: FAMILY MEDICINE

## 2022-04-04 PROCEDURE — 86803 HEPATITIS C AB TEST: CPT | Performed by: FAMILY MEDICINE

## 2022-04-04 PROCEDURE — 85025 COMPLETE CBC W/AUTO DIFF WBC: CPT | Performed by: FAMILY MEDICINE

## 2022-04-04 PROCEDURE — 80061 LIPID PANEL: CPT | Performed by: FAMILY MEDICINE

## 2022-04-04 PROCEDURE — 84443 ASSAY THYROID STIM HORMONE: CPT | Performed by: FAMILY MEDICINE

## 2022-04-04 PROCEDURE — 82043 UR ALBUMIN QUANTITATIVE: CPT | Performed by: FAMILY MEDICINE

## 2022-04-04 PROCEDURE — 84153 ASSAY OF PSA TOTAL: CPT | Performed by: FAMILY MEDICINE

## 2022-04-04 PROCEDURE — 83036 HEMOGLOBIN GLYCOSYLATED A1C: CPT | Performed by: FAMILY MEDICINE

## 2022-06-28 ENCOUNTER — TELEPHONE (OUTPATIENT)
Dept: FAMILY MEDICINE CLINIC | Facility: CLINIC | Age: 67
End: 2022-06-28

## 2022-06-28 RX ORDER — ALBUTEROL SULFATE 90 UG/1
2 AEROSOL, METERED RESPIRATORY (INHALATION) EVERY 4 HOURS PRN
Qty: 8 G | Refills: 2 | Status: SHIPPED | OUTPATIENT
Start: 2022-06-28 | End: 2023-04-03 | Stop reason: SDUPTHER

## 2022-09-29 DIAGNOSIS — I10 PRIMARY HYPERTENSION: ICD-10-CM

## 2022-09-30 RX ORDER — AMLODIPINE BESYLATE 2.5 MG/1
2.5 TABLET ORAL NIGHTLY
Qty: 30 TABLET | Refills: 0 | Status: SHIPPED | OUTPATIENT
Start: 2022-09-30 | End: 2022-10-25

## 2022-10-24 DIAGNOSIS — I10 PRIMARY HYPERTENSION: ICD-10-CM

## 2022-10-25 RX ORDER — AMLODIPINE BESYLATE 2.5 MG/1
2.5 TABLET ORAL NIGHTLY
Qty: 30 TABLET | Refills: 0 | Status: SHIPPED | OUTPATIENT
Start: 2022-10-25 | End: 2022-11-27

## 2022-10-25 NOTE — TELEPHONE ENCOUNTER
Rx Refill Note  Requested Prescriptions     Pending Prescriptions Disp Refills   • amLODIPine (NORVASC) 2.5 MG tablet [Pharmacy Med Name: AMLODIPINE BESYLATE 2.5 MG TAB] 30 tablet 0     Sig: TAKE 1 TABLET BY MOUTH EVERY NIGHT  DAYS.      Last office visit with prescribing clinician: 3/21/2022      Next office visit with prescribing clinician: Visit date not found   {  Sent a 30 day RX. Pt is needing an appointment.     Okay for the HUB to relay message

## 2022-11-24 DIAGNOSIS — I10 PRIMARY HYPERTENSION: ICD-10-CM

## 2022-11-27 RX ORDER — AMLODIPINE BESYLATE 2.5 MG/1
TABLET ORAL
Qty: 30 TABLET | Refills: 1 | Status: SHIPPED | OUTPATIENT
Start: 2022-11-27 | End: 2022-12-28

## 2022-12-16 DIAGNOSIS — K21.00 GASTROESOPHAGEAL REFLUX DISEASE WITH ESOPHAGITIS WITHOUT HEMORRHAGE: ICD-10-CM

## 2022-12-16 NOTE — TELEPHONE ENCOUNTER
Rx Refill Note  Requested Prescriptions     Pending Prescriptions Disp Refills   • pantoprazole (PROTONIX) 40 MG EC tablet [Pharmacy Med Name: PANTOPRAZOLE SOD DR 40 MG TAB] 180 tablet 1     Sig: TAKE 1 TABLET BY MOUTH TWICE A DAY      Last office visit with prescribing clinician: 3/21/2022   Next office visit with prescribing clinician: Visit date not found     Sent a 30 day RX. Pt is needing an appointment.     Okay for the HUB to relay message

## 2022-12-18 RX ORDER — PANTOPRAZOLE SODIUM 40 MG/1
TABLET, DELAYED RELEASE ORAL
Qty: 60 TABLET | Refills: 0 | Status: SHIPPED | OUTPATIENT
Start: 2022-12-18 | End: 2023-01-12

## 2022-12-28 DIAGNOSIS — I10 PRIMARY HYPERTENSION: ICD-10-CM

## 2022-12-28 RX ORDER — AMLODIPINE BESYLATE 2.5 MG/1
TABLET ORAL
Qty: 30 TABLET | Refills: 0 | Status: SHIPPED | OUTPATIENT
Start: 2022-12-28 | End: 2023-01-30

## 2022-12-29 NOTE — PROGRESS NOTES
"Adult Nutrition  Assessment/PES    Patient Name:  Darryn Baxter  YOB: 1955  MRN: [id number]  Admit Date:  7/11/2020    Assessment Date:  7/13/2020    Comments:  Nutrition follow up.  MD would like TFs to start today.  Spoke with RN and patient.  Patient reports he has been using Boost at home via PEG.  He reports he is supposed to get 4-6 cartons/day, but has never been able to tolerate that much in the 5 weeks that he reports he has been on TFs.      Recommend trying TF formula of Isosource 1.5 to see how patient tolerates.  Recommend trialing continuous feeds to start with.  Recommend starting Isosource 1.5 at 20 ml/hr and advancing by 10 ml q 8 hours to goal rate of 70 ml/hr.  Recommend free water flushes of 30 ml q 4 hours for now while patient on IVFs.    MSA completed for severe malnutrition d/t patient's weight loss and was able to complete a limited nutrition focused physical exam.    RD to monitor closely for tolerance.    Reason for Assessment     Row Name 07/13/20 1603          Reason for Assessment    Reason For Assessment  TF/PN         Anthropometrics     Row Name 07/13/20 1603          Anthropometrics    Height  175.3 cm (69.02\")        Admit Weight    Admit Weight  -- 194# 7/13        Ideal Body Weight (IBW)    Ideal Body Weight (IBW) (kg)  73.73        Body Mass Index (BMI)    BMI Assessment  BMI 25-29.9: overweight 28.68         Labs/Tests/Procedures/Meds     Row Name 07/13/20 1604          Labs/Procedures/Meds    Lab Results Reviewed  reviewed, pertinent     Lab Results Comments  Gluc, K, BUN, Hgb, Hct        Diagnostic Tests/Procedures    Diagnostic Test/Procedure Reviewed  reviewed, pertinent        Medications    Pertinent Medications Reviewed  reviewed, pertinent     Pertinent Medications Comments  protonix, prednisone, IVFs         Physical Findings     Row Name 07/13/20 1605          Physical Findings    Gastrointestinal  feeding tube     Tubes  PEG     Oral/Mouth Cavity  " "edentulous;other (see comments) partial tongue removed     Skin  other (see comments) B=18, intact         Estimated/Assessed Needs     Row Name 07/13/20 1607 07/13/20 1603       Calculation Measurements    Weight Used For Calculations  72.6 kg (160 lb) IBW  --    Height  --  175.3 cm (69.02\")       Estimated/Assessed Needs       KCAL/KG    KCAL/KG  30 Kcal/Kg (kcal);35 Kcal/Kg (kcal)  --    30 Kcal/Kg (kcal)  2177.28  --    35 Kcal/Kg (kcal)  2540.16  --       Protein Requirements    Weight Used For Protein Calculations  72.6 kg (160 lb) IBW  --    Est Protein Requirement Amount (gms/kg); used 1.5-1.8 g/kg IBW  109-131  --       Fluid Requirements    Estimated Fluid Requirements (mL/day)  2200  --        Nutrition Prescription Ordered     Row Name 07/13/20 1608          Nutrition Prescription PO    Current PO Diet  NPO;Sips/ice chips        Nutrition Prescription EN    Enteral Route  PEG         Malnutrition Severity Assessment     Row Name 07/13/20 1609          Malnutrition Severity Assessment    Malnutrition Type  Chronic Disease - Related Malnutrition        Insufficient Energy Intake     Insufficient Energy Intake Findings  Severe     Insufficient Energy Intake   <50% of est. energy requirement for >or equal to 1 month        Unintentional Weight Loss     Unintentional Weight Loss Findings  Severe     Unintentional Weight Loss   Weight loss greater than 10% in six months        Muscle Loss    Loss of Muscle Mass Findings  Moderate     Acromion Bone Region  Moderate - acromion may slightly protrude        Fat Loss    Subcutaneous Fat Loss Findings  Moderate     Orbital Region   Moderate -  somewhat hollowness, slightly dark circles        Criteria Met (Must meet criteria for severity in at least 2 of these categories: M Wasting, Fat Loss, Fluid, Secondary Signs, Wt. Status, Intake)    Patient meets criteria for   Severe Malnutrition         Problem/Interventions:    Problem 2     Row Name 07/13/20 1612          " Nutrition Diagnoses Problem 2    Problem 2  Malnutrition     Etiology (related to)  Medical Diagnosis     Oncology  Head/neck cancer     Signs/Symptoms (evidenced by)  Unintended Weight Change;Formula intolerance     Unintended Weight Change  Loss     Number of Pounds Lost  56     Weight loss time period  4-6 months         Intervention Goal     Row Name 07/13/20 1611          Intervention Goal    General  Maintain nutrition;Reduce/improve symptoms;Disease management/therapy;Nutrition support treatment     TF/PN  Inititiate TF/PN;Establish TF tolerance;Tolerate TF at goal;Deliver estimated need (%);Deliver (%) goal     Deliver % of Goal  100 %     Deliver % of Estimated Need  100 %     Weight  Maintain weight         Nutrition Intervention     Row Name 07/13/20 1614          Nutrition Intervention    RD/Tech Action  Follow Tx progress;Care plan reviewd;Recommend/ordered     Recommended/Ordered  EN         Nutrition Prescription     Row Name 07/13/20 1615          Nutrition Prescription EN    Enteral Prescription  Enteral begin/change;Enteral to supply     Enteral Route  PEG     Product  Isosource 1.5 ron     TF Delivery Method  Continuous     Continuous TF Goal Rate (mL/hr)  70 mL/hr     Continuous TF Starting Rate (mL/hr)  20 mL/hr     Water flush (mL)   30 mL     Water Flush Frequency  Every 4 hours while IVFs running (after IVFs d/c'ed, rec 35 ml q hr)     New EN Prescription Ordered?  Yes        EN to Supply    Kcal/Day  2520 Kcal/Day     Kcal/Kg  29 Kcal/Kg     Kcal/Kg Weight Method  Actual weight     Protein (gm/day)  114 gm/day     Meet Estimated Kcal Need (%)  100 %     Meet Estimated Protein Need (%)  100 %     TF Free H2O (mL)  1277 mL     Total Free H2O (mL/day)  4457 mL/day TFs + flushes + IVFs         Education/Evaluation     Row Name 07/13/20 1615          Education    Education  Provided education regarding     Provided education regarding  Other (comment) TF formula change        Monitor/Evaluation     Monitor  Per protocol;Pertinent labs;TF delivery/tolerance;Weight;Skin status;Symptoms     Education Follow-up  Reinforce PRN           Electronically signed by:  Pily Tran RD  07/13/20 16:20   PAST MEDICAL HISTORY:  Cataract     Cervical pain     Erbs muscular dystrophy left arm    Flat-back syndrome     H/O thyroid disease     History of discitis     Lower back pain     Lumbar spondylosis     Osteoarthritis     Pulmonary emboli 2/2019 completed 6 months of eliquis; saw hematology no clotting disorder    Thyroid cyst

## 2023-01-11 DIAGNOSIS — K21.00 GASTROESOPHAGEAL REFLUX DISEASE WITH ESOPHAGITIS WITHOUT HEMORRHAGE: ICD-10-CM

## 2023-01-12 RX ORDER — PANTOPRAZOLE SODIUM 40 MG/1
TABLET, DELAYED RELEASE ORAL
Qty: 60 TABLET | Refills: 0 | Status: SHIPPED | OUTPATIENT
Start: 2023-01-12 | End: 2023-02-06

## 2023-01-30 DIAGNOSIS — I10 PRIMARY HYPERTENSION: ICD-10-CM

## 2023-01-30 RX ORDER — AMLODIPINE BESYLATE 2.5 MG/1
TABLET ORAL
Qty: 15 TABLET | Refills: 0 | Status: SHIPPED | OUTPATIENT
Start: 2023-01-30 | End: 2023-04-03 | Stop reason: SDUPTHER

## 2023-02-06 DIAGNOSIS — K21.00 GASTROESOPHAGEAL REFLUX DISEASE WITH ESOPHAGITIS WITHOUT HEMORRHAGE: ICD-10-CM

## 2023-02-06 RX ORDER — PANTOPRAZOLE SODIUM 40 MG/1
TABLET, DELAYED RELEASE ORAL
Qty: 28 TABLET | Refills: 0 | Status: SHIPPED | OUTPATIENT
Start: 2023-02-06 | End: 2023-03-28 | Stop reason: SDUPTHER

## 2023-02-06 NOTE — TELEPHONE ENCOUNTER
Rx Refill Note  Requested Prescriptions     Pending Prescriptions Disp Refills   • pantoprazole (PROTONIX) 40 MG EC tablet [Pharmacy Med Name: PANTOPRAZOLE SOD DR 40 MG TAB] 60 tablet 0     Sig: TAKE 1 TABLET BY MOUTH TWICE A DAY      Last office visit with prescribing clinician: 3/21/2022   Next office visit with prescribing clinician: Visit date not found     Sent a 14 day RX. Pt is needing an appointment.     Okay for the HUB to relay message

## 2023-02-09 DIAGNOSIS — I10 PRIMARY HYPERTENSION: ICD-10-CM

## 2023-02-09 RX ORDER — AMLODIPINE BESYLATE 2.5 MG/1
TABLET ORAL
Qty: 15 TABLET | Refills: 0 | OUTPATIENT
Start: 2023-02-09

## 2023-02-09 NOTE — TELEPHONE ENCOUNTER
Rx Refill Note  Requested Prescriptions     Pending Prescriptions Disp Refills   • amLODIPine (NORVASC) 2.5 MG tablet [Pharmacy Med Name: AMLODIPINE BESYLATE 2.5 MG TAB] 15 tablet 0     Sig: TAKE 1 TABLET BY MOUTH EVERY DAY AT NIGHT      Last office visit with prescribing clinician: 3/21/2022   Next office visit with prescribing clinician: Visit date not found   {  DENIED. NEEDS APPOINTMENT     OK FOR HUB TO RELAY

## 2023-03-10 DIAGNOSIS — K21.00 GASTROESOPHAGEAL REFLUX DISEASE WITH ESOPHAGITIS WITHOUT HEMORRHAGE: ICD-10-CM

## 2023-03-10 DIAGNOSIS — I10 PRIMARY HYPERTENSION: ICD-10-CM

## 2023-03-10 RX ORDER — PANTOPRAZOLE SODIUM 40 MG/1
TABLET, DELAYED RELEASE ORAL
Qty: 28 TABLET | Refills: 0 | OUTPATIENT
Start: 2023-03-10

## 2023-03-10 RX ORDER — AMLODIPINE BESYLATE 2.5 MG/1
TABLET ORAL
Qty: 15 TABLET | Refills: 0 | OUTPATIENT
Start: 2023-03-10

## 2023-03-28 DIAGNOSIS — K21.00 GASTROESOPHAGEAL REFLUX DISEASE WITH ESOPHAGITIS WITHOUT HEMORRHAGE: ICD-10-CM

## 2023-03-28 RX ORDER — PANTOPRAZOLE SODIUM 40 MG/1
40 TABLET, DELAYED RELEASE ORAL 2 TIMES DAILY
Qty: 7 TABLET | Refills: 0 | Status: SHIPPED | OUTPATIENT
Start: 2023-03-28 | End: 2023-03-29 | Stop reason: SDUPTHER

## 2023-03-28 NOTE — TELEPHONE ENCOUNTER
Rx Refill Note  Requested Prescriptions      No prescriptions requested or ordered in this encounter      Last office visit with prescribing clinician: 3/21/2022   Next office visit with prescribing clinician: 4/3/2023

## 2023-03-28 NOTE — TELEPHONE ENCOUNTER
Caller: Darryn Baxter    Relationship: Self    Best call back number: [phone] (Mobile)    Requested Prescriptions:   pantoprazole (PROTONIX) 40 MG EC tablet    Pharmacy where request should be sent: [facility]/pharmacy #[id number] - [place], [place] - [address] - [phone]  - [phone] FX         Last office visit with prescribing clinician: 3/21/2022   Last telemedicine visit with prescribing clinician: 4/3/2023   Next office visit with prescribing clinician: 4/3/2023     Additional details provided by patient: PATIENT CALLED TO REQUEST A MEDICATION REFILL ON HIS/HER MEDICATION. PATIENT STATES THAT HE IS COMPLETELY OUT MEDICATION.        Does the patient have less than a 3 day supply:  [x] Yes  [] No    Would you like a call back once the refill request has been completed: [] Yes [] No    If the office needs to give you a call back, can they leave a voicemail: [] Yes [] No    Nakia Renteria Rep   03/28/23 12:50 EDT         THANKS

## 2023-03-29 DIAGNOSIS — K21.00 GASTROESOPHAGEAL REFLUX DISEASE WITH ESOPHAGITIS WITHOUT HEMORRHAGE: ICD-10-CM

## 2023-03-29 RX ORDER — PANTOPRAZOLE SODIUM 40 MG/1
40 TABLET, DELAYED RELEASE ORAL 2 TIMES DAILY
Qty: 7 TABLET | Refills: 0 | Status: SHIPPED | OUTPATIENT
Start: 2023-03-29 | End: 2023-04-03 | Stop reason: SDUPTHER

## 2023-03-29 NOTE — TELEPHONE ENCOUNTER
Caller: Darryn Baxter    Relationship: Self    Best call back number: [phone]     Requested Prescriptions:   Requested Prescriptions     Pending Prescriptions Disp Refills   • pantoprazole (PROTONIX) 40 MG EC tablet 7 tablet 0     Sig: Take 1 tablet by mouth 2 (Two) Times a Day.        Pharmacy where request should be sent: [facility]/PHARMACY #[id number] - [place], [place] - [address] - [phone]  - [phone] FX     Last office visit with prescribing clinician: 3/21/2022   Last telemedicine visit with prescribing clinician: 4/3/2023   Next office visit with prescribing clinician: 4/3/2023     Additional details provided by patient: PATIENT CALLING WANTING TO KNOW IF HE CAN GET ENOUGH SUPPLY TO LAST UNTIL HIS APPOINTMENT HE STATES HE RAN OUT YESTERDAY     Does the patient have less than a 3 day supply:  [x] Yes  [] No    Would you like a call back once the refill request has been completed: [x] Yes [] No    If the office needs to give you a call back, can they leave a voicemail: [x] Yes [] No    Nakia Baires Rep   03/29/23 10:03 EDT

## 2023-04-03 ENCOUNTER — OFFICE VISIT (OUTPATIENT)
Dept: FAMILY MEDICINE CLINIC | Facility: CLINIC | Age: 68
End: 2023-04-03
Payer: MEDICARE

## 2023-04-03 VITALS
SYSTOLIC BLOOD PRESSURE: 132 MMHG | WEIGHT: 175 LBS | DIASTOLIC BLOOD PRESSURE: 76 MMHG | OXYGEN SATURATION: 97 % | RESPIRATION RATE: 18 BRPM | BODY MASS INDEX: 25.83 KG/M2 | TEMPERATURE: 97.6 F | HEART RATE: 89 BPM

## 2023-04-03 DIAGNOSIS — I10 PRIMARY HYPERTENSION: ICD-10-CM

## 2023-04-03 DIAGNOSIS — J43.8 OTHER EMPHYSEMA: Primary | ICD-10-CM

## 2023-04-03 DIAGNOSIS — E78.49 OTHER HYPERLIPIDEMIA: ICD-10-CM

## 2023-04-03 DIAGNOSIS — K21.00 GASTROESOPHAGEAL REFLUX DISEASE WITH ESOPHAGITIS WITHOUT HEMORRHAGE: ICD-10-CM

## 2023-04-03 PROBLEM — J44.1 COPD WITH EXACERBATION: Status: ACTIVE | Noted: 2023-04-03

## 2023-04-03 PROCEDURE — 3075F SYST BP GE 130 - 139MM HG: CPT | Performed by: FAMILY MEDICINE

## 2023-04-03 PROCEDURE — 99214 OFFICE O/P EST MOD 30 MIN: CPT | Performed by: FAMILY MEDICINE

## 2023-04-03 PROCEDURE — 3078F DIAST BP <80 MM HG: CPT | Performed by: FAMILY MEDICINE

## 2023-04-03 RX ORDER — PANTOPRAZOLE SODIUM 40 MG/1
40 TABLET, DELAYED RELEASE ORAL 2 TIMES DAILY
Qty: 180 TABLET | Refills: 2 | Status: SHIPPED | OUTPATIENT
Start: 2023-04-03 | End: 2023-12-29

## 2023-04-03 RX ORDER — ALBUTEROL SULFATE 90 UG/1
2 AEROSOL, METERED RESPIRATORY (INHALATION) EVERY 4 HOURS PRN
Qty: 18 G | Refills: 2 | Status: SHIPPED | OUTPATIENT
Start: 2023-04-03

## 2023-04-03 RX ORDER — AMLODIPINE BESYLATE 2.5 MG/1
2.5 TABLET ORAL
Qty: 90 TABLET | Refills: 2 | Status: SHIPPED | OUTPATIENT
Start: 2023-04-03 | End: 2023-12-29

## 2023-04-03 RX ORDER — ATORVASTATIN CALCIUM 20 MG/1
20 TABLET, FILM COATED ORAL EVERY EVENING
Qty: 90 TABLET | Refills: 2 | Status: SHIPPED | OUTPATIENT
Start: 2023-04-03 | End: 2023-12-29

## 2023-04-03 NOTE — PROGRESS NOTES
Chief Complaint  Hypertension (Med refills )    Faisal Cates presents to [facility] for  lab review and to refill current medications. Overall he feels well. No medication side effects are reported.           Objective   Vital Signs:   Vitals:    04/03/23 1316   BP: 132/76   Pulse: 89   Resp: 18   Temp: 97.6 °F (36.4 °C)   SpO2: 97%   Weight: 79.4 kg (175 lb)                Physical Exam  Vitals reviewed.   Constitutional:       General: He is not in acute distress.  Eyes:      General: Lids are normal.      Conjunctiva/sclera: Conjunctivae normal.   Neck:      Vascular: No carotid bruit.      Trachea: No tracheal deviation.   Cardiovascular:      Rate and Rhythm: Normal rate and regular rhythm.      Heart sounds: Normal heart sounds. No murmur heard.  Pulmonary:      Effort: Pulmonary effort is normal.      Breath sounds: Normal breath sounds.   Skin:     General: Skin is warm and dry.   Neurological:      Mental Status: He is alert. He is not disoriented.   Psychiatric:         Speech: Speech normal.         Behavior: Behavior normal. Behavior is cooperative.          Result Review :                Assessment and Plan    Diagnoses and all orders for this visit:    1. Other emphysema (Primary)  Assessment & Plan:  COPD is unchanged.  continue with prn albuterol.         Orders:  -     albuterol sulfate  (90 Base) MCG/ACT inhaler; Inhale 2 puffs Every 4 (Four) Hours As Needed for Wheezing.  Dispense: 18 g; Refill: 2    2. Primary hypertension  Assessment & Plan:  Hypertension is unchanged.  Continue current treatment regimen.  Blood pressure will be reassessed at the next regular appointment.    Orders:  -     amLODIPine (NORVASC) 2.5 MG tablet; Take 1 tablet by mouth every night at bedtime for 270 days.  Dispense: 90 tablet; Refill: 2  -     Comprehensive Metabolic Panel; Future  -     CBC & Differential; Future  -     TSH; Future    3. Gastroesophageal reflux  disease with esophagitis without hemorrhage  Assessment & Plan:  The current medical regimen is effective;  continue present plan and medications.      Orders:  -     pantoprazole (PROTONIX) 40 MG EC tablet; Take 1 tablet by mouth 2 (Two) Times a Day for 270 days.  Dispense: 180 tablet; Refill: 2    4. Other hyperlipidemia  Assessment & Plan:  Lipid abnormalities are unchanged.  Pharmacotherapy as ordered.  Lipids will be reassessed in 6 months.    Orders:  -     atorvastatin (LIPITOR) 20 MG tablet; Take 1 tablet by mouth Every Evening for 270 days.  Dispense: 90 tablet; Refill: 2  -     CK; Future  -     Lipid Panel With / Chol / HDL Ratio; Future      Follow Up   Return in about 6 months (around 10/3/2023) for Medicare Wellness & regular visit, [id number] min.  Patient was given instructions and counseling regarding his condition or for health maintenance advice. Please see specific information pulled into the AVS if appropriate.

## 2023-10-16 ENCOUNTER — OFFICE VISIT (OUTPATIENT)
Dept: FAMILY MEDICINE CLINIC | Facility: CLINIC | Age: 68
End: 2023-10-16
Payer: MEDICARE

## 2023-10-16 VITALS
BODY MASS INDEX: 28.67 KG/M2 | WEIGHT: 189.2 LBS | HEART RATE: 90 BPM | OXYGEN SATURATION: 96 % | TEMPERATURE: 97.4 F | HEIGHT: 68 IN | RESPIRATION RATE: 16 BRPM | DIASTOLIC BLOOD PRESSURE: 76 MMHG | SYSTOLIC BLOOD PRESSURE: 157 MMHG

## 2023-10-16 DIAGNOSIS — E78.49 OTHER HYPERLIPIDEMIA: ICD-10-CM

## 2023-10-16 DIAGNOSIS — I10 PRIMARY HYPERTENSION: ICD-10-CM

## 2023-10-16 DIAGNOSIS — J43.8 OTHER EMPHYSEMA: ICD-10-CM

## 2023-10-16 DIAGNOSIS — Z00.00 MEDICARE ANNUAL WELLNESS VISIT, SUBSEQUENT: Primary | ICD-10-CM

## 2023-10-16 DIAGNOSIS — K21.00 GASTROESOPHAGEAL REFLUX DISEASE WITH ESOPHAGITIS WITHOUT HEMORRHAGE: ICD-10-CM

## 2023-10-16 DIAGNOSIS — Z23 IMMUNIZATION DUE: ICD-10-CM

## 2023-10-16 PROCEDURE — 3077F SYST BP >= 140 MM HG: CPT | Performed by: FAMILY MEDICINE

## 2023-10-16 PROCEDURE — 90662 IIV NO PRSV INCREASED AG IM: CPT | Performed by: FAMILY MEDICINE

## 2023-10-16 PROCEDURE — 99214 OFFICE O/P EST MOD 30 MIN: CPT | Performed by: FAMILY MEDICINE

## 2023-10-16 PROCEDURE — G0439 PPPS, SUBSEQ VISIT: HCPCS | Performed by: FAMILY MEDICINE

## 2023-10-16 PROCEDURE — 1170F FXNL STATUS ASSESSED: CPT | Performed by: FAMILY MEDICINE

## 2023-10-16 PROCEDURE — 1160F RVW MEDS BY RX/DR IN RCRD: CPT | Performed by: FAMILY MEDICINE

## 2023-10-16 PROCEDURE — G0008 ADMIN INFLUENZA VIRUS VAC: HCPCS | Performed by: FAMILY MEDICINE

## 2023-10-16 PROCEDURE — 3078F DIAST BP <80 MM HG: CPT | Performed by: FAMILY MEDICINE

## 2023-10-16 RX ORDER — AMLODIPINE BESYLATE 5 MG/1
5 TABLET ORAL
Qty: 90 TABLET | Refills: 2 | Status: SHIPPED | OUTPATIENT
Start: 2023-10-16 | End: 2024-07-12

## 2023-10-16 RX ORDER — ATORVASTATIN CALCIUM 20 MG/1
20 TABLET, FILM COATED ORAL EVERY EVENING
Qty: 90 TABLET | Refills: 2 | Status: SHIPPED | OUTPATIENT
Start: 2023-10-16 | End: 2024-07-12

## 2023-10-16 RX ORDER — PANTOPRAZOLE SODIUM 40 MG/1
40 TABLET, DELAYED RELEASE ORAL 2 TIMES DAILY
Qty: 180 TABLET | Refills: 2 | Status: SHIPPED | OUTPATIENT
Start: 2023-10-16 | End: 2024-07-12

## 2023-10-16 RX ORDER — ALBUTEROL SULFATE 90 UG/1
2 AEROSOL, METERED RESPIRATORY (INHALATION) EVERY 4 HOURS PRN
Qty: 18 G | Refills: 2 | Status: SHIPPED | OUTPATIENT
Start: 2023-10-16

## 2023-10-16 NOTE — ASSESSMENT & PLAN NOTE
Blood pressure is slightly above goal.  Increase amlodipine to 5 mg at bedtime dosing.  Recheck blood pressure in 6 to 8 weeks.

## 2023-10-16 NOTE — PROGRESS NOTES
The ABCs of the Annual Wellness Visit  Subsequent Medicare Wellness Visit    Subjective    Darryn Baxter is a 68 y.o. male who presents for a Subsequent Medicare Wellness Visit.    The following portions of the patient's history were reviewed and   updated as appropriate: allergies, current medications, past family history, past medical history, past social history, past surgical history, and problem list.    Compared to one year ago, the patient feels his physical   health is worse. Feels cold    Compared to one year ago, the patient feels his mental   health is worse. More tense    Recent Hospitalizations:  He was not admitted to the hospital during the last year.       Current Medical Providers:  Patient Care Team:  Darryn Johnson MD as PCP - General (Family Medicine)    Outpatient Medications Prior to Visit   Medication Sig Dispense Refill    calcium polycarbophil (FIBERCON) 625 MG tablet Take 1 tablet by mouth Daily.      CBD (cannabidiol) oral oil Take  by mouth.      albuterol sulfate HFA (Ventolin HFA) 108 (90 Base) MCG/ACT inhaler TAKE 2 PUFFS BY MOUTH EVERY 4 HOURS AS NEEDED FOR WHEEZE 18 g 2    amLODIPine (NORVASC) 2.5 MG tablet Take 1 tablet by mouth every night at bedtime for 270 days. 90 tablet 2    atorvastatin (LIPITOR) 20 MG tablet Take 1 tablet by mouth Every Evening for 270 days. 90 tablet 2    pantoprazole (PROTONIX) 40 MG EC tablet Take 1 tablet by mouth 2 (Two) Times a Day for 270 days. 180 tablet 2     No facility-administered medications prior to visit.       No opioid medication identified on active medication list. I have reviewed chart for other potential  high risk medication/s and harmful drug interactions in the elderly.        Aspirin is not on active medication list.  Aspirin use is not indicated based on review of current medical condition/s. Risk of harm outweighs potential benefits.  .    Patient Active Problem List   Diagnosis    Primary hypertension    Impaired vision     "Blindness of both eyes    History of AAA (abdominal aortic aneurysm) repair    Tongue cancer    Other hyperlipidemia    Eczema    Gastroesophageal reflux disease with esophagitis without hemorrhage    Nocturia    BARBARA (obstructive sleep apnea)    Other emphysema     Advance Care Planning   Advance Care Planning     Advance Directive is not on file.  ACP discussion was held with the patient during this visit. Patient has an advance directive (not in EMR), copy requested.     Objective    Vitals:    10/16/23 1352   BP: 157/76   BP Location: Right arm   Patient Position: Sitting   Pulse: 90   Resp: 16   Temp: 97.4 °F (36.3 °C)   TempSrc: Oral   SpO2: 96%   Weight: 85.8 kg (189 lb 3.2 oz)   Height: 172.7 cm (68\")     Estimated body mass index is 28.77 kg/m² as calculated from the following:    Height as of this encounter: 172.7 cm (68\").    Weight as of this encounter: 85.8 kg (189 lb 3.2 oz).    BMI is >= 25 and <30. (Overweight) The following options were offered after discussion;: exercise counseling/recommendations and nutrition counseling/recommendations      Does the patient have evidence of cognitive impairment? No          HEALTH RISK ASSESSMENT    Smoking Status:  Social History     Tobacco Use   Smoking Status Every Day    Packs/day: 0.25    Years: 15.00    Additional pack years: 0.00    Total pack years: 3.75    Types: Cigarettes   Smokeless Tobacco Never   Tobacco Comments    5 cigarettes     Alcohol Consumption:  Social History     Substance and Sexual Activity   Alcohol Use No    Comment: FORMER   16 YRS SOBER     Fall Risk Screen:    STEADI Fall Risk Assessment has not been completed.    Depression Screening:      10/16/2023     1:56 PM   PHQ-2/PHQ-9 Depression Screening   Little Interest or Pleasure in Doing Things 0-->not at all   Feeling Down, Depressed or Hopeless 0-->not at all   PHQ-9: Brief Depression Severity Measure Score 0       Health Habits and Functional and Cognitive Screening:      " 10/16/2023     1:00 PM   Functional & Cognitive Status   Do you have difficulty preparing food and eating? No   Do you have difficulty bathing yourself, getting dressed or grooming yourself? No   Do you have difficulty using the toilet? No   Do you have difficulty moving around from place to place? No   Do you have trouble with steps or getting out of a bed or a chair? No   Current Diet Unhealthy Diet   Dental Exam Up to date   Eye Exam Up to date   Exercise (times per week) 5 times per week   Current Exercises Include Walking   Do you need help using the phone?  No   Are you deaf or do you have serious difficulty hearing?  No   Do you need help to go to places out of walking distance? Yes   Do you need help shopping? Yes   Do you need help preparing meals?  No   Do you need help with housework?  No   Do you need help with laundry? No   Do you need help taking your medications? No   Do you need help managing money? No   Do you ever drive or ride in a car without wearing a seat belt? No   Have you felt unusual stress, anger or loneliness in the last month? No   Who do you live with? Spouse   If you need help, do you have trouble finding someone available to you? No   Have you been bothered in the last four weeks by sexual problems? No   Do you have difficulty concentrating, remembering or making decisions? No       Age-appropriate Screening Schedule:  Refer to the list below for future screening recommendations based on patient's age, sex and/or medical conditions. Orders for these recommended tests are listed in the plan section. The patient has been provided with a written plan.    Health Maintenance   Topic Date Due    BMI FOLLOWUP  Never done    TDAP/TD VACCINES (1 - Tdap) Never done    ZOSTER VACCINE (2 of 2) 10/19/2018    Pneumococcal Vaccine 65+ (2 - PCV) 11/14/2020    LIPID PANEL  04/04/2023    INFLUENZA VACCINE  08/01/2023    COVID-19 Vaccine (8 - 2023-24 season) 09/01/2023    COLORECTAL CANCER SCREENING   "07/01/2024    ANNUAL WELLNESS VISIT  10/16/2024    HEPATITIS C SCREENING  Completed    AAA SCREEN (ONE-TIME)  Completed                  CMS Preventative Services Quick Reference  Risk Factors Identified During Encounter  Immunizations Discussed/Encouraged: Tdap, Influenza, Pneumococcal 23, and Shingrix  Inactivity/Sedentary: Patient was advised to exercise at least 150 minutes a week per CDC recommendations.  The above risks/problems have been discussed with the patient.  Pertinent information has been shared with the patient in the After Visit Summary.  An After Visit Summary and PPPS were made available to the patient.    Follow Up:   Next Medicare Wellness visit to be scheduled in 1 year.       Additional E&M Note during same encounter follows:  Patient has multiple medical problems which are significant and separately identifiable that require additional work above and beyond the Medicare Wellness Visit.      Chief Complaint  Medicare Wellness-subsequent and Med Refill    Subjective        HPI  Darryn Baxter is also being seen today for medication refills. Overall he feels well. Good medication compliance is reported. No medication side effects are reported.          Objective   Vital Signs:  /76 (BP Location: Right arm, Patient Position: Sitting)   Pulse 90   Temp 97.4 °F (36.3 °C) (Oral)   Resp 16   Ht 172.7 cm (68\")   Wt 85.8 kg (189 lb 3.2 oz)   SpO2 96%   BMI 28.77 kg/m²     Physical Exam  Vitals reviewed.   Constitutional:       General: He is not in acute distress.  Eyes:      Comments: Poor vision   Neck:      Vascular: No carotid bruit.      Trachea: No tracheal deviation.   Cardiovascular:      Rate and Rhythm: Normal rate and regular rhythm.      Heart sounds: Normal heart sounds. No murmur heard.  Pulmonary:      Effort: Pulmonary effort is normal.      Breath sounds: Normal breath sounds.   Skin:     General: Skin is warm and dry.   Neurological:      Mental Status: He is alert. He is " not disoriented.   Psychiatric:         Speech: Speech normal.         Behavior: Behavior normal. Behavior is cooperative.                         Assessment and Plan   Diagnoses and all orders for this visit:    1. Medicare annual wellness visit, subsequent (Primary)    2. Other emphysema  Assessment & Plan:  COPD is unchanged.  Continue current medications.        Orders:  -     albuterol sulfate HFA (Ventolin HFA) 108 (90 Base) MCG/ACT inhaler; Inhale 2 puffs Every 4 (Four) Hours As Needed for Wheezing.  Dispense: 18 g; Refill: 2    3. Primary hypertension  Assessment & Plan:  Blood pressure is slightly above goal.  Increase amlodipine to 5 mg at bedtime dosing.  Recheck blood pressure in 6 to 8 weeks.    Orders:  -     amLODIPine (NORVASC) 5 MG tablet; Take 1 tablet by mouth every night at bedtime for 270 days.  Dispense: 90 tablet; Refill: 2    4. Other hyperlipidemia  Assessment & Plan:  Condition is stable. The current medical regimen is effective;  continue present plan and medications.    Orders:  -     atorvastatin (LIPITOR) 20 MG tablet; Take 1 tablet by mouth Every Evening for 270 days.  Dispense: 90 tablet; Refill: 2    5. Gastroesophageal reflux disease with esophagitis without hemorrhage  Assessment & Plan:  The current medical regimen is effective;  continue present plan and medications.      Orders:  -     pantoprazole (PROTONIX) 40 MG EC tablet; Take 1 tablet by mouth 2 (Two) Times a Day for 270 days.  Dispense: 180 tablet; Refill: 2    6. Immunization due  -     Fluzone High-Dose 65+yrs (2081-4907)  -     Cancel: Pneumococcal Conjugate Vaccine 20-Valent All             Follow Up   Return in about 3 months (around 1/16/2024) for BP Check.  Patient was given instructions and counseling regarding his condition or for health maintenance advice. Please see specific information pulled into the AVS if appropriate.

## 2023-10-16 NOTE — PATIENT INSTRUCTIONS
Advance Care Planning and Advance Directives     You make decisions on a daily basis - decisions about where you want to live, your career, your home, your life. Perhaps one of the most important decisions you face is your choice for future medical care. Take time to talk with your family and your healthcare team and start planning today.  Advance Care Planning is a process that can help you:  Understand possible future healthcare decisions in light of your own experiences  Reflect on those decision in light of your goals and values  Discuss your decisions with those closest to you and the healthcare professionals that care for you  Make a plan by creating a document that reflects your wishes    Surrogate Decision Maker  In the event of a medical emergency, which has left you unable to communicate or to make your own decisions, you would need someone to make decisions for you.  It is important to discuss your preferences for medical treatment with this person while you are in good health.     Qualities of a surrogate decision maker:  Willing to take on this role and responsibility  Knows what you want for future medical care  Willing to follow your wishes even if they don't agree with them  Able to make difficult medical decisions under stressful circumstances    Advance Directives  These are legal documents you can create that will guide your healthcare team and decision maker(s) when needed. These documents can be stored in the electronic medical record.    Living Will - a legal document to guide your care if you have a terminal condition or a serious illness and are unable to communicate. States vary by statute in document names/types, but most forms may include one or more of the following:        -  Directions regarding life-prolonging treatments        -  Directions regarding artificially provided nutrition/hydration        -  Choosing a healthcare decision maker        -  Direction regarding organ/tissue  donation    Durable Power of  for Healthcare - this document names an -in-fact to make medical decisions for you, but it may also allow this person to make personal and financial decisions for you. Please seek the advice of an  if you need this type of document.    **Advance Directives are not required and no one may discriminate against you if you do not sign one.    Medical Orders  Many states allow specific forms/orders signed by your physician to record your wishes for medical treatment in your current state of health. This form, signed in personal communication with your physician, addresses resuscitation and other medical interventions that you may or may not want.      For more information or to schedule a time with a [facility] Advance Care Planning Facilitator contact: [facility] or call [phone] and someone will contact you directly.  You are due for adacel Tdap vaccination. (provides protection against tetanus, diptheria and whooping cough) Please  get the immunization at your local pharmacy at your earliest convenience. This immunization will next be due in 10 years. Please click on the link for more information about this vaccine.    [facility] Tdap Vaccine Information    You are due for Shingrix vaccination series ( the newest shingles vaccine).  It is a two shot series spaced 2-6 months apart. Please get this vaccine series started at your earliest convenience at your local pharmacy to help avoid shingles outbreak. It is more effective than the old Zostavax vaccine and is recommended even if you have had the Zostavax vaccine in the past.  Once the Shingrix series is completed, it does not need to be repeated.   For more information, please look at the website below:  [facility] Shingrix Vaccine Information    Your annual Covid booster is now due.     RSV vaccine is recommended.       Medicare Wellness  Personal Prevention Plan of Service     Date of Office Visit:     Encounter Provider:  Darryn Johnson MD  Place of Service:  [facility]  Patient Name: Darryn Baxter  :  1955    As part of the Medicare Wellness portion of your visit today, we are providing you with this personalized preventive plan of services (PPPS). This plan is based upon recommendations of the [place] Preventive Services Task Force (USPSTF) and the Advisory Committee on Immunization Practices (ACIP).    This lists the preventive care services that should be considered, and provides dates of when you are due. Items listed as completed are up-to-date and do not require any further intervention.    Health Maintenance   Topic Date Due    BMI FOLLOWUP  Never done    TDAP/TD VACCINES (1 - Tdap) Never done    ZOSTER VACCINE (2 of 2) 10/19/2018    Pneumococcal Vaccine 65+ (2 - PCV) 2020    LIPID PANEL  2023    INFLUENZA VACCINE  2023    COVID-19 Vaccine (8 - -24 season) 2023    COLORECTAL CANCER SCREENING  2024    ANNUAL WELLNESS VISIT  10/16/2024    HEPATITIS C SCREENING  Completed    AAA SCREEN (ONE-TIME)  Completed       Orders Placed This Encounter   Procedures    Fluzone High-Dose 65+yrs ([id number])    Pneumococcal Conjugate Vaccine 20-Valent All         No follow-ups on file.

## 2024-03-01 DIAGNOSIS — I71.40 ABDOMINAL AORTIC ANEURYSM (AAA) WITHOUT RUPTURE, UNSPECIFIED PART: Primary | ICD-10-CM

## 2024-03-01 DIAGNOSIS — I71.41 PARARENAL ABDOMINAL AORTIC ANEURYSM (AAA) WITHOUT RUPTURE: ICD-10-CM
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
## 2024-07-12 DIAGNOSIS — I10 PRIMARY HYPERTENSION: ICD-10-CM

## 2024-07-12 RX ORDER — AMLODIPINE BESYLATE 5 MG/1
5 TABLET ORAL
Qty: 30 TABLET | Refills: 0 | Status: SHIPPED | OUTPATIENT
Start: 2024-07-12 | End: 2025-04-08

## 2024-07-12 NOTE — TELEPHONE ENCOUNTER
Last visit: 10-16-23  Next visit: Return in about 3 months (around 1/16/2024) for BP Check    Called pt left vm stating he is due for appt, would request 30 day supply of meds

## 2024-08-11 DIAGNOSIS — I10 PRIMARY HYPERTENSION: ICD-10-CM

## 2024-08-12 RX ORDER — AMLODIPINE BESYLATE 5 MG/1
TABLET ORAL
Qty: 30 TABLET | Refills: 0 | Status: SHIPPED | OUTPATIENT
Start: 2024-08-12

## 2024-08-17 DIAGNOSIS — E78.49 OTHER HYPERLIPIDEMIA: ICD-10-CM

## 2024-08-19 RX ORDER — ATORVASTATIN CALCIUM 20 MG/1
20 TABLET, FILM COATED ORAL EVERY EVENING
Qty: 30 TABLET | Refills: 0 | Status: SHIPPED | OUTPATIENT
Start: 2024-08-19 | End: 2025-05-16

## 2024-08-19 NOTE — TELEPHONE ENCOUNTER
Last visit: 10-16-23  Next visit: Return in about 3 months (around 1/16/2024) for BP Check.     Sent pt msg stating they are way overdue for appt and would request 30 day supply of meds

## 2024-08-20 DIAGNOSIS — K21.00 GASTROESOPHAGEAL REFLUX DISEASE WITH ESOPHAGITIS WITHOUT HEMORRHAGE: ICD-10-CM

## 2024-08-20 RX ORDER — PANTOPRAZOLE SODIUM 40 MG/1
40 TABLET, DELAYED RELEASE ORAL 2 TIMES DAILY
Qty: 60 TABLET | Refills: 0 | OUTPATIENT
Start: 2024-08-20 | End: 2025-05-17

## 2024-08-23 ENCOUNTER — TELEPHONE (OUTPATIENT)
Dept: FAMILY MEDICINE CLINIC | Facility: CLINIC | Age: 69
End: 2024-08-23
Payer: MEDICARE

## 2024-08-23 DIAGNOSIS — E78.49 OTHER HYPERLIPIDEMIA: ICD-10-CM

## 2024-08-23 DIAGNOSIS — I10 PRIMARY HYPERTENSION: Primary | ICD-10-CM

## 2024-08-23 NOTE — TELEPHONE ENCOUNTER
Caller: TerrenceeduPascual      Best call back number: [phone]    What orders are you requesting (i.e. lab or imaging): LABS DUE TO MEDICATION REFILL     Where will you receive your lab/imaging services: PATIENTS WOULD LIKE TO HAVE LABS DRAWN AT THE [facility] ITS CLOSER TO THEIR HOME     PATIENTS WANTS TO HAVE LABS DONE ON BOTH HE AND HIS WIFE  THE SAME DAY     PLEASE GIVE PATIENT CALLBACK WHEN LAB ORDER IS COMPLETE HE WILL THEN MAKE APPOINTMENT FOR HE AND HIS WIFE TO HAVE FOLLOW UP APPOINTMENT DR MCKINNON

## 2024-09-04 ENCOUNTER — TELEPHONE (OUTPATIENT)
Dept: FAMILY MEDICINE CLINIC | Facility: CLINIC | Age: 69
End: 2024-09-04
Payer: MEDICARE

## 2024-09-04 NOTE — TELEPHONE ENCOUNTER
Caller: Darryn Baxter    Relationship: Self    Best call back number: [phone]     What is the best time to reach you: AS SOON AS POSSIBLE    What was the call regarding: PATIENT STATES THAT HE AND HIS WIFE ARE DUE TO COME INTO THE OFFICE FOR APPOINTMENTS AND NEED TO SPEAK TO SOMEONE ABOUT IF THEY NEED THEIR LABS DONE BEFORE HAND.     PLEASE CALL PATIENT TO DISCUSS AND ADVISE.     Is it okay if the provider responds through MyChart: NO

## 2024-09-05 ENCOUNTER — LAB (OUTPATIENT)
Facility: HOSPITAL | Age: 69
End: 2024-09-05
Payer: MEDICARE

## 2024-09-05 PROCEDURE — 82550 ASSAY OF CK (CPK): CPT | Performed by: FAMILY MEDICINE

## 2024-09-05 PROCEDURE — 84443 ASSAY THYROID STIM HORMONE: CPT | Performed by: FAMILY MEDICINE

## 2024-09-05 PROCEDURE — 85025 COMPLETE CBC W/AUTO DIFF WBC: CPT | Performed by: FAMILY MEDICINE

## 2024-09-05 PROCEDURE — 80061 LIPID PANEL: CPT | Performed by: FAMILY MEDICINE

## 2024-09-05 PROCEDURE — 80053 COMPREHEN METABOLIC PANEL: CPT | Performed by: FAMILY MEDICINE

## 2024-09-09 ENCOUNTER — OFFICE VISIT (OUTPATIENT)
Dept: FAMILY MEDICINE CLINIC | Facility: CLINIC | Age: 69
End: 2024-09-09
Payer: MEDICARE

## 2024-09-09 VITALS
BODY MASS INDEX: 26.98 KG/M2 | DIASTOLIC BLOOD PRESSURE: 80 MMHG | HEIGHT: 68 IN | SYSTOLIC BLOOD PRESSURE: 130 MMHG | HEART RATE: 80 BPM | OXYGEN SATURATION: 97 % | WEIGHT: 178 LBS

## 2024-09-09 DIAGNOSIS — K21.00 GASTROESOPHAGEAL REFLUX DISEASE WITH ESOPHAGITIS WITHOUT HEMORRHAGE: ICD-10-CM

## 2024-09-09 DIAGNOSIS — E78.49 OTHER HYPERLIPIDEMIA: ICD-10-CM

## 2024-09-09 DIAGNOSIS — I10 PRIMARY HYPERTENSION: Primary | ICD-10-CM

## 2024-09-09 DIAGNOSIS — J43.8 OTHER EMPHYSEMA: ICD-10-CM

## 2024-09-09 DIAGNOSIS — R45.7 CAREGIVER STRESS SYNDROME: ICD-10-CM

## 2024-09-09 DIAGNOSIS — R35.1 NOCTURIA: ICD-10-CM

## 2024-09-09 PROBLEM — F43.89 CAREGIVER STRESS SYNDROME: Status: ACTIVE | Noted: 2024-09-09

## 2024-09-09 PROCEDURE — 1125F AMNT PAIN NOTED PAIN PRSNT: CPT | Performed by: FAMILY MEDICINE

## 2024-09-09 PROCEDURE — 99214 OFFICE O/P EST MOD 30 MIN: CPT | Performed by: FAMILY MEDICINE

## 2024-09-09 PROCEDURE — 3075F SYST BP GE 130 - 139MM HG: CPT | Performed by: FAMILY MEDICINE

## 2024-09-09 PROCEDURE — 3079F DIAST BP 80-89 MM HG: CPT | Performed by: FAMILY MEDICINE

## 2024-09-09 PROCEDURE — G2211 COMPLEX E/M VISIT ADD ON: HCPCS | Performed by: FAMILY MEDICINE

## 2024-09-09 RX ORDER — PANTOPRAZOLE SODIUM 40 MG/1
40 TABLET, DELAYED RELEASE ORAL 2 TIMES DAILY
Qty: 180 TABLET | Refills: 2 | Status: SHIPPED | OUTPATIENT
Start: 2024-09-09 | End: 2025-06-06

## 2024-09-09 RX ORDER — ALBUTEROL SULFATE 90 UG/1
2 AEROSOL, METERED RESPIRATORY (INHALATION) EVERY 4 HOURS PRN
Qty: 18 G | Refills: 2 | Status: SHIPPED | OUTPATIENT
Start: 2024-09-09

## 2024-09-09 RX ORDER — ATORVASTATIN CALCIUM 20 MG/1
20 TABLET, FILM COATED ORAL EVERY EVENING
Qty: 90 TABLET | Refills: 2 | Status: SHIPPED | OUTPATIENT
Start: 2024-09-09 | End: 2025-06-06

## 2024-09-09 RX ORDER — AMLODIPINE BESYLATE 5 MG/1
5 TABLET ORAL DAILY
Qty: 90 TABLET | Refills: 2 | Status: SHIPPED | OUTPATIENT
Start: 2024-09-09 | End: 2025-06-06

## 2024-09-09 NOTE — PROGRESS NOTES
"Chief Complaint  Med Refill    Subjective        HPI      Patient presents the office for medication refill and lab review.  He is feeling well physically.  He has mental stressors due to ongoing caregiver stress which has been getting worse.  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  He is asking for some help with this condition and chronic care management has been ordered for the family to help with her issues.  Today his blood pressure is controlled and his lipids are controlled his stomach situation is stable.  He uses albuterol on as-needed basis for breathing difficulties.  Refills have been sent.  Labs have been reviewed and are seen below.           Vital Signs:   Vitals:    09/09/24 1559   BP: 130/80   Pulse: 80   SpO2: 97%   Weight: 80.7 kg (178 lb)   Height: 172.7 cm (68\")            9/9/2024     4:07 PM   PHQ-2/PHQ-9 Depression Screening   Little Interest or Pleasure in Doing Things 0-->not at all   Feeling Down, Depressed or Hopeless 1-->several days   PHQ-9: Brief Depression Severity Measure Score 1                 Physical Exam  Vitals reviewed.   Constitutional:       General: He is not in acute distress.  Eyes:      General: Lids are normal.      Conjunctiva/sclera: Conjunctivae normal.   Neck:      Vascular: No carotid bruit.      Trachea: No tracheal deviation.   Cardiovascular:      Rate and Rhythm: Normal rate and regular rhythm.      Heart sounds: Normal heart sounds. No murmur heard.  Pulmonary:      Effort: Pulmonary effort is normal.      Breath sounds: Normal breath sounds.   Skin:     General: Skin is warm and dry.   Neurological:      Mental Status: He is alert. He is not disoriented.   Psychiatric:         Speech: Speech normal.         Behavior: Behavior normal. Behavior is cooperative.                 The following data was reviewed by: Darryn Johnson MD on 09/09/2024:  Comprehensive Metabolic Panel " (09/05/2024 08:18)  CBC & Differential (09/05/2024 08:18)  CK (09/05/2024 08:18)  Lipid Panel With / Chol / HDL Ratio (09/05/2024 08:18)  TSH (09/05/2024 08:18)    Results                  Assessment and Plan     Orders Placed This Encounter   Procedures    Comprehensive Metabolic Panel    CK    Lipid Panel With / Chol / HDL Ratio    TSH    PSA DIAGNOSTIC    Ambulatory Referral to Behavioral Health    CBC & Differential     New Medications Ordered This Visit   Medications    pantoprazole (PROTONIX) 40 MG EC tablet     Sig: Take 1 tablet by mouth 2 (Two) Times a Day for 270 days.     Dispense:  180 tablet     Refill:  2    atorvastatin (LIPITOR) 20 MG tablet     Sig: Take 1 tablet by mouth Every Evening for 270 days.     Dispense:  90 tablet     Refill:  2    amLODIPine (NORVASC) 5 MG tablet     Sig: Take 1 tablet by mouth Daily for 270 days.     Dispense:  90 tablet     Refill:  2    albuterol sulfate HFA (Ventolin HFA) 108 (90 Base) MCG/ACT inhaler     Sig: Inhale 2 puffs Every 4 (Four) Hours As Needed for Wheezing.     Dispense:  18 g     Refill:  2        Primary hypertension  Controlled    Gastroesophageal reflux disease with esophagitis without hemorrhage  Controlled continue same medication    Other hyperlipidemia  Condition controlled.  Continue same medication.  Other emphysema  Continue with as needed use of Alexandra medication  Nocturia  Check PSA in 6 months    Caregiver stress syndrome  Information shared in after visit summary.  Referral to [facility] for visit to help address this situation.  Chronic care management referral ordered for his wife which should help.     Return in about 6 months (around 3/9/2025) for Medicare Wellness & regular visit, [id number] min.     Patient was given instructions and counseling regarding his condition or for health maintenance advice. Please see specific information pulled into the AVS if appropriate.     Patient or patient representative verbalized consent  for the use of Ambient Listening during the visit with  Darryn Johnson MD for chart documentation. 9/9/2024  16:48 EDT

## 2024-09-25 ENCOUNTER — TELEPHONE (OUTPATIENT)
Dept: FAMILY MEDICINE CLINIC | Facility: CLINIC | Age: 69
End: 2024-09-25
Payer: MEDICARE

## 2024-09-25 DIAGNOSIS — R45.7 CAREGIVER STRESS SYNDROME: Primary | ICD-10-CM

## 2024-10-25 ENCOUNTER — TELEMEDICINE (OUTPATIENT)
Dept: PSYCHIATRY | Facility: CLINIC | Age: 69
End: 2024-10-25
Payer: MEDICARE

## 2024-10-25 DIAGNOSIS — F43.23 ADJUSTMENT DISORDER WITH MIXED ANXIETY AND DEPRESSED MOOD: Primary | ICD-10-CM
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
## 2025-02-03 NOTE — TELEPHONE ENCOUNTER
Orders , can you sign please?   Show Applicator Variable?: Yes Consent: The patient's consent was obtained including but not limited to risks of crusting, scabbing, blistering, scarring, darker or lighter pigmentary change, recurrence, incomplete removal and infection. Render Post-Care Instructions In Note?: no Duration Of Freeze Thaw-Cycle (Seconds): 0 Post-Care Instructions: I reviewed with the patient in detail post-care instructions. Patient is to wear sunprotection, and avoid picking at any of the treated lesions. Pt may apply Vaseline to crusted or scabbing areas. Detail Level: Detailed Hyrimoz Counseling:  I discussed with the patient the risks of adalimumab including but not limited to myelosuppression, immunosuppression, autoimmune hepatitis, demyelinating diseases, lymphoma, and serious infections.  The patient understands that monitoring is required including a PPD at baseline and must alert us or the primary physician if symptoms of infection or other concerning signs are noted. Topical Clindamycin Pregnancy And Lactation Text: This medication is Pregnancy Category B and is considered safe during pregnancy. It is unknown if it is excreted in breast milk. Spironolactone Pregnancy And Lactation Text: This medication can cause feminization of the male fetus and should be avoided during pregnancy. The active metabolite is also found in breast milk. Olanzapine Pregnancy And Lactation Text: This medication is pregnancy category C.   There are no adequate and well controlled trials with olanzapine in pregnant females.  Olanzapine should be used during pregnancy only if the potential benefit justifies the potential risk to the fetus.   In a study in lactating healthy women, olanzapine was excreted in breast milk.  It is recommended that women taking olanzapine should not breast feed. Spevigo Pregnancy And Lactation Text: The risk during pregnancy and breastfeeding is uncertain with this medication. This medication does cross the placenta. It is unknown if this medication is found in breast milk. Methotrexate Counseling:  Patient counseled regarding adverse effects of methotrexate including but not limited to nausea, vomiting, abnormalities in liver function tests. Patients may develop mouth sores, rash, diarrhea, and abnormalities in blood counts. The patient understands that monitoring is required including LFT's and blood counts.  There is a rare possibility of scarring of the liver and lung problems that can occur when taking methotrexate. Persistent nausea, loss of appetite, pale stools, dark urine, cough, and shortness of breath should be reported immediately. Patient advised to discontinue methotrexate treatment at least three months before attempting to become pregnant.  I discussed the need for folate supplements while taking methotrexate.  These supplements can decrease side effects during methotrexate treatment. The patient verbalized understanding of the proper use and possible adverse effects of methotrexate.  All of the patient's questions and concerns were addressed. Minocycline Counseling: Patient advised regarding possible photosensitivity and discoloration of the teeth, skin, lips, tongue and gums.  Patient instructed to avoid sunlight, if possible.  When exposed to sunlight, patients should wear protective clothing, sunglasses, and sunscreen.  The patient was instructed to call the office immediately if the following severe adverse effects occur:  hearing changes, easy bruising/bleeding, severe headache, or vision changes.  The patient verbalized understanding of the proper use and possible adverse effects of minocycline.  All of the patient's questions and concerns were addressed. Olumiant Pregnancy And Lactation Text: Based on animal studies, Olumiant may cause embryo-fetal harm when administered to pregnant women.  The medication should not be used in pregnancy.  Breastfeeding is not recommended during treatment. Detail Level: Simple Opzelura Pregnancy And Lactation Text: There is insufficient data to evaluate drug-associated risk for major birth defects, miscarriage, or other adverse maternal or fetal outcomes.  There is a pregnancy registry that monitors pregnancy outcomes in pregnant persons exposed to the medication during pregnancy.  It is unknown if this medication is excreted in breast milk.  Do not breastfeed during treatment and for about 4 weeks after the last dose. Aklief counseling:  Patient advised to apply a pea-sized amount only at bedtime and wait 30 minutes after washing their face before applying.  If too drying, patient may add a non-comedogenic moisturizer.  The most commonly reported side effects including irritation, redness, scaling, dryness, stinging, burning, itching, and increased risk of sunburn.  The patient verbalized understanding of the proper use and possible adverse effects of retinoids.  All of the patient's questions and concerns were addressed. Terbinafine Pregnancy And Lactation Text: This medication is Pregnancy Category B and is considered safe during pregnancy. It is also excreted in breast milk and breast feeding isn't recommended. Cantharidin Counseling:  I discussed with the patient the risks of Cantharidin including but not limited to pain, redness, burning, itching, and blistering. Hyrimoz Pregnancy And Lactation Text: This medication is Pregnancy Category B and is considered safe during pregnancy. It is unknown if this medication is excreted in breast milk. Topical Ketoconazole Counseling: Patient counseled that this medication may cause skin irritation or allergic reactions.  In the event of skin irritation, the patient was advised to reduce the amount of the drug applied or use it less frequently.   The patient verbalized understanding of the proper use and possible adverse effects of ketoconazole.  All of the patient's questions and concerns were addressed. 5-Fu Pregnancy And Lactation Text: This medication is Pregnancy Category X and contraindicated in pregnancy and in women who may become pregnant. It is unknown if this medication is excreted in breast milk. Colchicine Counseling:  Patient counseled regarding adverse effects including but not limited to stomach upset (nausea, vomiting, stomach pain, or diarrhea).  Patient instructed to limit alcohol consumption while taking this medication.  Colchicine may reduce blood counts especially with prolonged use.  The patient understands that monitoring of kidney function and blood counts may be required, especially at baseline. The patient verbalized understanding of the proper use and possible adverse effects of colchicine.  All of the patient's questions and concerns were addressed. Oral Minoxidil Counseling- I discussed with the patient the risks of oral minoxidil including but not limited to shortness of breath, swelling of the feet or ankles, dizziness, lightheadedness, unwanted hair growth and allergic reaction.  The patient verbalized understanding of the proper use and possible adverse effects of oral minoxidil.  All of the patient's questions and concerns were addressed. Methotrexate Pregnancy And Lactation Text: This medication is Pregnancy Category X and is known to cause fetal harm. This medication is excreted in breast milk. Minocycline Pregnancy And Lactation Text: This medication is Pregnancy Category D and not consider safe during pregnancy. It is also excreted in breast milk. Stelara Counseling:  I discussed with the patient the risks of ustekinumab including but not limited to immunosuppression, malignancy, posterior leukoencephalopathy syndrome, and serious infections.  The patient understands that monitoring is required including a PPD at baseline and must alert us or the primary physician if symptoms of infection or other concerning signs are noted. Xolair Pregnancy And Lactation Text: This medication is Pregnancy Category B and is considered safe during pregnancy. This medication is excreted in breast milk. Picato Counseling:  I discussed with the patient the risks of Picato including but not limited to erythema, scaling, itching, weeping, crusting, and pain. Fluconazole Counseling:  Patient counseled regarding adverse effects of fluconazole including but not limited to headache, diarrhea, nausea, upset stomach, liver function test abnormalities, taste disturbance, and stomach pain.  There is a rare possibility of liver failure that can occur when taking fluconazole.  The patient understands that monitoring of LFTs and kidney function test may be required, especially at baseline. The patient verbalized understanding of the proper use and possible adverse effects of fluconazole.  All of the patient's questions and concerns were addressed. Rinvoq Counseling: I discussed with the patient the risks of Rinvoq therapy including but not limited to upper respiratory tract infections, shingles, cold sores, bronchitis, nausea, cough, fever, acne, and headache. Live vaccines should be avoided.  This medication has been linked to serious infections; higher rate of mortality; malignancy and lymphoproliferative disorders; major adverse cardiovascular events; thrombosis; thrombocytopenia, anemia, and neutropenia; lipid elevations; liver enzyme elevations; and gastrointestinal perforations. Terbinafine Counseling: Patient counseling regarding adverse effects of terbinafine including but not limited to headache, diarrhea, rash, upset stomach, liver function test abnormalities, itching, taste/smell disturbance, nausea, abdominal pain, and flatulence.  There is a rare possibility of liver failure that can occur when taking terbinafine.  The patient understands that a baseline LFT and kidney function test may be required. The patient verbalized understanding of the proper use and possible adverse effects of terbinafine.  All of the patient's questions and concerns were addressed. Calcipotriene Pregnancy And Lactation Text: The use of this medication during pregnancy or lactation is not recommended as there is insufficient data. Ilumya Counseling: I discussed with the patient the risks of tildrakizumab including but not limited to immunosuppression, malignancy, posterior leukoencephalopathy syndrome, and serious infections.  The patient understands that monitoring is required including a PPD at baseline and must alert us or the primary physician if symptoms of infection or other concerning signs are noted. Include Pregnancy/Lactation Warning?: No Colchicine Pregnancy And Lactation Text: This medication is Pregnancy Category C and isn't considered safe during pregnancy. It is excreted in breast milk. Quinolones Counseling:  I discussed with the patient the risks of fluoroquinolones including but not limited to GI upset, allergic reaction, drug rash, diarrhea, dizziness, photosensitivity, yeast infections, liver function test abnormalities, tendonitis/tendon rupture. Drysol Counseling:  I discussed with the patient the risks of drysol/aluminum chloride including but not limited to skin rash, itching, irritation, burning. Prednisone Counseling:  I discussed with the patient the risks of prolonged use of prednisone including but not limited to weight gain, insomnia, osteoporosis, mood changes, diabetes, susceptibility to infection, glaucoma and high blood pressure.  In cases where prednisone use is prolonged, patients should be monitored with blood pressure checks, serum glucose levels and an eye exam.  Additionally, the patient may need to be placed on GI prophylaxis, PCP prophylaxis, and calcium and vitamin D supplementation and/or a bisphosphonate.  The patient verbalized understanding of the proper use and the possible adverse effects of prednisone.  All of the patient's questions and concerns were addressed. Oral Minoxidil Pregnancy And Lactation Text: This medication should only be used when clearly needed if you are pregnant, attempting to become pregnant or breast feeding. Adbry Counseling: I discussed with the patient the risks of tralokinumab including but not limited to eye infection and irritation, cold sores, injection site reactions, worsening of asthma, allergic reactions and increased risk of parasitic infection.  Live vaccines should be avoided while taking tralokinumab. The patient understands that monitoring is required and they must alert us or the primary physician if symptoms of infection or other concerning signs are noted. Picato Pregnancy And Lactation Text: This medication is Pregnancy Category C. It is unknown if this medication is excreted in breast milk. Rinvoq Pregnancy And Lactation Text: Based on animal studies, Rinvoq may cause embryo-fetal harm when administered to pregnant women.  The medication should not be used in pregnancy.  Breastfeeding is not recommended during treatment and for 6 days after the last dose. Ketoconazole Pregnancy And Lactation Text: This medication is Pregnancy Category C and it isn't know if it is safe during pregnancy. It is also excreted in breast milk and breast feeding isn't recommended. Ilumya Pregnancy And Lactation Text: The risk during pregnancy and breastfeeding is uncertain with this medication. Calcipotriene Counseling:  I discussed with the patient the risks of calcipotriene including but not limited to erythema, scaling, itching, and irritation. Dapsone Counseling: I discussed with the patient the risks of dapsone including but not limited to hemolytic anemia, agranulocytosis, rashes, methemoglobinemia, kidney failure, peripheral neuropathy, headaches, GI upset, and liver toxicity.  Patients who start dapsone require monitoring including baseline LFTs and weekly CBCs for the first month, then every month thereafter.  The patient verbalized understanding of the proper use and possible adverse effects of dapsone.  All of the patient's questions and concerns were addressed. Drysol Pregnancy And Lactation Text: This medication is considered safe during pregnancy and breast feeding. Topical Metronidazole Counseling: Metronidazole is a topical antibiotic medication. You may experience burning, stinging, redness, or allergic reactions.  Please call our office if you develop any problems from using this medication. Otezla Counseling: The side effects of Otezla were discussed with the patient, including but not limited to worsening or new depression, weight loss, diarrhea, nausea, upper respiratory tract infection, and headache. Patient instructed to call the office should any adverse effect occur.  The patient verbalized understanding of the proper use and possible adverse effects of Otezla.  All the patient's questions and concerns were addressed. Quinolones Pregnancy And Lactation Text: This medication is Pregnancy Category C and it isn't know if it is safe during pregnancy. It is also excreted in breast milk. Adbry Pregnancy And Lactation Text: It is unknown if this medication will adversely affect pregnancy or breast feeding. Opioid Counseling: I discussed with the patient the potential side effects of opioids including but not limited to addiction, altered mental status, and depression. I stressed avoiding alcohol, benzodiazepines, muscle relaxants and sleep aids unless specifically okayed by a physician. The patient verbalized understanding of the proper use and possible adverse effects of opioids. All of the patient's questions and concerns were addressed. They were instructed to flush the remaining pills down the toilet if they did not need them for pain. Taltz Counseling: I discussed with the patient the risks of ixekizumab including but not limited to immunosuppression, serious infections, worsening of inflammatory bowel disease and drug reactions.  The patient understands that monitoring is required including a PPD at baseline and must alert us or the primary physician if symptoms of infection or other concerning signs are noted. Prednisone Pregnancy And Lactation Text: This medication is Pregnancy Category C and it isn't know if it is safe during pregnancy. This medication is excreted in breast milk. Protopic Counseling: Patient may experience a mild burning sensation during topical application. Protopic is not approved in children less than 2 years of age. There have been case reports of hematologic and skin malignancies in patients using topical calcineurin inhibitors although causality is questionable. Xeljanz Counseling: I discussed with the patient the risks of Xeljanz therapy including increased risk of infection, liver issues, headache, diarrhea, or cold symptoms. Live vaccines should be avoided. They were instructed to call if they have any problems. Ketoconazole Counseling:   Patient counseled regarding improving absorption with orange juice.  Adverse effects include but are not limited to breast enlargement, headache, diarrhea, nausea, upset stomach, liver function test abnormalities, taste disturbance, and stomach pain.  There is a rare possibility of liver failure that can occur when taking ketoconazole. The patient understands that monitoring of LFTs may be required, especially at baseline. The patient verbalized understanding of the proper use and possible adverse effects of ketoconazole.  All of the patient's questions and concerns were addressed. Infliximab Counseling:  I discussed with the patient the risks of infliximab including but not limited to myelosuppression, immunosuppression, autoimmune hepatitis, demyelinating diseases, lymphoma, and serious infections.  The patient understands that monitoring is required including a PPD at baseline and must alert us or the primary physician if symptoms of infection or other concerning signs are noted. Elidel Counseling: Patient may experience a mild burning sensation during topical application. Elidel is not approved in children less than 2 years of age. There have been case reports of hematologic and skin malignancies in patients using topical calcineurin inhibitors although causality is questionable. Topical Metronidazole Pregnancy And Lactation Text: This medication is Pregnancy Category B and considered safe during pregnancy.  It is also considered safe to use while breastfeeding. Dapsone Pregnancy And Lactation Text: This medication is Pregnancy Category C and is not considered safe during pregnancy or breast feeding. Bimzelx Counseling:  I discussed with the patient the risks of Bimzelx including but not limited to depression, immunosuppression, allergic reactions and infections.  The patient understands that monitoring is required including a PPD at baseline and must alert us or the primary physician if symptoms of infection or other concerning signs are noted. Opioid Pregnancy And Lactation Text: These medications can lead to premature delivery and should be avoided during pregnancy. These medications are also present in breast milk in small amounts. Otezla Pregnancy And Lactation Text: This medication is Pregnancy Category C and it isn't known if it is safe during pregnancy. It is unknown if it is excreted in breast milk. Rifampin Counseling: I discussed with the patient the risks of rifampin including but not limited to liver damage, kidney damage, red-orange body fluids, nausea/vomiting and severe allergy. Wegovy Counseling: I reviewed the possible side effects including: thyroid tumors, kidney disease, gallbladder disease, abdominal pain, constipation, diarrhea, nausea, vomiting and pancreatitis. Do not take this medication if you have a history or family history of multiple endocrine neoplasia syndrome type 2. Side effects reviewed, pt to contact office should one occur. Vtama Pregnancy And Lactation Text: It is unknown if this medication can cause problems during pregnancy and breastfeeding. Minoxidil Counseling: Minoxidil is a topical medication which can increase blood flow where it is applied. It is uncertain how this medication increases hair growth. Side effects are uncommon and include stinging and allergic reactions. Cibinqo Counseling: I discussed with the patient the risks of Cibinqo therapy including but not limited to common cold, nausea, headache, cold sores, increased blood CPK levels, dizziness, UTIs, fatigue, acne, and vomitting. Live vaccines should be avoided.  This medication has been linked to serious infections; higher rate of mortality; malignancy and lymphoproliferative disorders; major adverse cardiovascular events; thrombosis; thrombocytopenia and lymphopenia; lipid elevations; and retinal detachment. Tranexamic Acid Pregnancy And Lactation Text: It is unknown if this medication is safe during pregnancy or breast feeding. Enbrel Counseling:  I discussed with the patient the risks of etanercept including but not limited to myelosuppression, immunosuppression, autoimmune hepatitis, demyelinating diseases, lymphoma, and infections.  The patient understands that monitoring is required including a PPD at baseline and must alert us or the primary physician if symptoms of infection or other concerning signs are noted. Niacinamide Pregnancy And Lactation Text: These medications are considered safe during pregnancy. Libtayo Counseling- I discussed with the patient the risks of Libtayo including but not limited to nausea, vomiting, diarrhea, and bone or muscle pain.  The patient verbalized understanding of the proper use and possible adverse effects of Libtayo.  All of the patient's questions and concerns were addressed. Finasteride Pregnancy And Lactation Text: This medication is absolutely contraindicated during pregnancy. It is unknown if it is excreted in breast milk. Hydroxyzine Pregnancy And Lactation Text: This medication is not safe during pregnancy and should not be taken. It is also excreted in breast milk and breast feeding isn't recommended. High Dose Vitamin A Counseling: Side effects reviewed, pt to contact office should one occur. Erythromycin Counseling:  I discussed with the patient the risks of erythromycin including but not limited to GI upset, allergic reaction, drug rash, diarrhea, increase in liver enzymes, and yeast infections. Cyclophosphamide Counseling:  I discussed with the patient the risks of cyclophosphamide including but not limited to hair loss, hormonal abnormalities, decreased fertility, abdominal pain, diarrhea, nausea and vomiting, bone marrow suppression and infection. The patient understands that monitoring is required while taking this medication. Cibinqo Pregnancy And Lactation Text: It is unknown if this medication will adversely affect pregnancy or breast feeding.  You should not take this medication if you are currently pregnant or planning a pregnancy or while breastfeeding. Zoryve Counseling:  I discussed with the patient that Zoryve is not for use in the eyes, mouth or vagina. The most commonly reported side effects include diarrhea, headache, insomnia, application site pain, upper respiratory tract infections, and urinary tract infections.  All of the patient's questions and concerns were addressed. Minoxidil Pregnancy And Lactation Text: This medication has not been assigned a Pregnancy Risk Category but animal studies failed to show danger with the topical medication. It is unknown if the medication is excreted in breast milk. Wegovy Pregnancy And Lactation Text: The fetal risk of this medication is unknown and taking while pregnant is not recommended. It is unknown if this medication is present in breast milk. Arava Counseling:  Patient counseled regarding adverse effects of Arava including but not limited to nausea, vomiting, abnormalities in liver function tests. Patients may develop mouth sores, rash, diarrhea, and abnormalities in blood counts. The patient understands that monitoring is required including LFTs and blood counts.  There is a rare possibility of scarring of the liver and lung problems that can occur when taking methotrexate. Persistent nausea, loss of appetite, pale stools, dark urine, cough, and shortness of breath should be reported immediately. Patient advised to discontinue Arava treatment and consult with a physician prior to attempting conception. The patient will have to undergo a treatment to eliminate Arava from the body prior to conception. Sotyktu Counseling:  I discussed the most common side effects of Sotyktu including: common cold, sore throat, sinus infections, cold sores, canker sores, folliculitis, and acne.? I also discussed more serious side effects of Sotyktu including but not limited to: serious allergic reactions; increased risk for infections such as TB; cancers such as lymphomas; rhabdomyolysis and elevated CPK; and elevated triglycerides and liver enzymes.? Valtrex Counseling: I discussed with the patient the risks of valacyclovir including but not limited to kidney damage, nausea, vomiting and severe allergy.  The patient understands that if the infection seems to be worsening or is not improving, they are to call. Libtayo Pregnancy And Lactation Text: This medication is contraindicated in pregnancy and when breast feeding. Tazorac Counseling:  Patient advised that medication is irritating and drying.  Patient may need to apply sparingly and wash off after an hour before eventually leaving it on overnight.  The patient verbalized understanding of the proper use and possible adverse effects of tazorac.  All of the patient's questions and concerns were addressed. High Dose Vitamin A Pregnancy And Lactation Text: High dose vitamin A therapy is contraindicated during pregnancy and breast feeding. Birth Control Pills Counseling: Birth Control Pill Counseling: I discussed with the patient the potential side effects of OCPs including but not limited to increased risk of stroke, heart attack, thrombophlebitis, deep venous thrombosis, hepatic adenomas, breast changes, GI upset, headaches, and depression.  The patient verbalized understanding of the proper use and possible adverse effects of OCPs. All of the patient's questions and concerns were addressed. Nsaids Counseling: NSAID Counseling: I discussed with the patient that NSAIDs should be taken with food. Prolonged use of NSAIDs can result in the development of stomach ulcers.  Patient advised to stop taking NSAIDs if abdominal pain occurs.  The patient verbalized understanding of the proper use and possible adverse effects of NSAIDs.  All of the patient's questions and concerns were addressed. Cyclophosphamide Pregnancy And Lactation Text: This medication is Pregnancy Category D and it isn't considered safe during pregnancy. This medication is excreted in breast milk. Erythromycin Pregnancy And Lactation Text: This medication is Pregnancy Category B and is considered safe during pregnancy. It is also excreted in breast milk. Zepbound Counseling: I reviewed the possible side effects including: thyroid tumors, kidney disease, gallbladder disease, abdominal pain, constipation, diarrhea, nausea, vomiting and pancreatitis. Do not take this medication if you have a history or family history of multiple endocrine neoplasia syndrome type 2. Side effects reviewed, pt to contact office should one occur. Skyrizi Counseling: I discussed with the patient the risks of risankizumab-rzaa including but not limited to immunosuppression, and serious infections.  The patient understands that monitoring is required including a PPD at baseline and must alert us or the primary physician if symptoms of infection or other concerning signs are noted. Mirvaso Counseling: Mirvaso is a topical medication which can decrease superficial blood flow where applied. Side effects are uncommon and include stinging, redness and allergic reactions. Litfulo Counseling: I discussed with the patient the risks of Litfulo therapy including but not limited to upper respiratory tract infections, shingles, cold sores, and nausea. Live vaccines should be avoided.  This medication has been linked to serious infections; higher rate of mortality; malignancy and lymphoproliferative disorders; major adverse cardiovascular events; thrombosis; gastrointestinal perforations; neutropenia; lymphopenia; anemia; liver enzyme elevations; and lipid elevations. Valtrex Pregnancy And Lactation Text: this medication is Pregnancy Category B and is considered safe during pregnancy. This medication is not directly found in breast milk but it's metabolite acyclovir is present. Humira Counseling:  I discussed with the patient the risks of adalimumab including but not limited to myelosuppression, immunosuppression, autoimmune hepatitis, demyelinating diseases, lymphoma, and serious infections.  The patient understands that monitoring is required including a PPD at baseline and must alert us or the primary physician if symptoms of infection or other concerning signs are noted. Odomzo Counseling- I discussed with the patient the risks of Odomzo including but not limited to nausea, vomiting, diarrhea, constipation, weight loss, changes in the sense of taste, decreased appetite, muscle spasms, and hair loss.  The patient verbalized understanding of the proper use and possible adverse effects of Odomzo.  All of the patient's questions and concerns were addressed. Arava Pregnancy And Lactation Text: This medication is Pregnancy Category X and is absolutely contraindicated during pregnancy. It is unknown if it is excreted in breast milk. Sotyktu Pregnancy And Lactation Text: There is insufficient data to evaluate whether or not Sotyktu is safe to use during pregnancy.? ?It is not known if Sotyktu passes into breast milk and whether or not it is safe to use when breastfeeding.?? Tazorac Pregnancy And Lactation Text: This medication is not safe during pregnancy. It is unknown if this medication is excreted in breast milk. Birth Control Pills Pregnancy And Lactation Text: This medication should be avoided if pregnant and for the first 30 days post-partum. Nsaids Pregnancy And Lactation Text: These medications are considered safe up to 30 weeks gestation. It is excreted in breast milk. Cyclosporine Counseling:  I discussed with the patient the risks of cyclosporine including but not limited to hypertension, gingival hyperplasia,myelosuppression, immunosuppression, liver damage, kidney damage, neurotoxicity, lymphoma, and serious infections. The patient understands that monitoring is required including baseline blood pressure, CBC, CMP, lipid panel and uric acid, and then 1-2 times monthly CMP and blood pressure. Mirvaso Pregnancy And Lactation Text: This medication has not been assigned a Pregnancy Risk Category. It is unknown if the medication is excreted in breast milk. Metronidazole Counseling:  I discussed with the patient the risks of metronidazole including but not limited to seizures, nausea/vomiting, a metallic taste in the mouth, nausea/vomiting and severe allergy. Litfulo Pregnancy And Lactation Text: Based on animal studies, Lifulo may cause embryo-fetal harm when administered to pregnant women.  The medication should not be used in pregnancy.  Breastfeeding is not recommended during treatment. Clofazimine Counseling:  I discussed with the patient the risks of clofazimine including but not limited to skin and eye pigmentation, liver damage, nausea/vomiting, gastrointestinal bleeding and allergy. Topical Clindamycin Counseling: Patient counseled that this medication may cause skin irritation or allergic reactions.  In the event of skin irritation, the patient was advised to reduce the amount of the drug applied or use it less frequently.   The patient verbalized understanding of the proper use and possible adverse effects of clindamycin.  All of the patient's questions and concerns were addressed. Spironolactone Counseling: Patient advised regarding risks of diarrhea, abdominal pain, hyperkalemia, birth defects (for female patients), liver toxicity and renal toxicity. The patient may need blood work to monitor liver and kidney function and potassium levels while on therapy. The patient verbalized understanding of the proper use and possible adverse effects of spironolactone.  All of the patient's questions and concerns were addressed. Olanzapine Counseling- I discussed with the patient the common side effects of olanzapine including but are not limited to: lack of energy, dry mouth, increased appetite, sleepiness, tremor, constipation, dizziness, changes in behavior, or restlessness.  Explained that teenagers are more likely to experience headaches, abdominal pain, pain in the arms or legs, tiredness, and sleepiness.  Serious side effects include but are not limited: increased risk of death in elderly patients who are confused, have memory loss, or dementia-related psychosis; hyperglycemia; increased cholesterol and triglycerides; and weight gain. Metronidazole Pregnancy And Lactation Text: This medication is Pregnancy Category B and considered safe during pregnancy.  It is also excreted in breast milk. Spevigo Counseling: I discussed with the patient the risks of Spevigo including but not limited to fatigue, nasuea, vomiting, headache, pruritus, urinary tract infection, an infusion related reactions.  The patient understands that monitoring is required including screening for tuberculosis at baseline and yearly screening thereafter while continuing Spevigo therapy. They should contact us if symptoms of infection or other concerning signs are noted. Opzelura Counseling:  I discussed with the patient the risks of Opzelura including but not limited to nasopharngitis, bronchitis, ear infection, eosinophila, hives, diarrhea, folliculitis, tonsillitis, and rhinorrhea.  Taken orally, this medication has been linked to serious infections; higher rate of mortality; malignancy and lymphoproliferative disorders; major adverse cardiovascular events; thrombosis; thrombocytopenia, anemia, and neutropenia; and lipid elevations. Olumiant Counseling: I discussed with the patient the risks of Olumiant therapy including but not limited to upper respiratory tract infections, shingles, cold sores, and nausea. Live vaccines should be avoided.  This medication has been linked to serious infections; higher rate of mortality; malignancy and lymphoproliferative disorders; major adverse cardiovascular events; thrombosis; gastrointestinal perforations; neutropenia; lymphopenia; anemia; liver enzyme elevations; and lipid elevations. 5-Fu Counseling: 5-Fluorouracil Counseling:  I discussed with the patient the risks of 5-fluorouracil including but not limited to erythema, scaling, itching, weeping, crusting, and pain. SSKI Counseling:  I discussed with the patient the risks of SSKI including but not limited to thyroid abnormalities, metallic taste, GI upset, fever, headache, acne, arthralgias, paraesthesias, lymphadenopathy, easy bleeding, arrhythmias, and allergic reaction. Hydroxychloroquine Counseling:  I discussed with the patient that a baseline ophthalmologic exam is needed at the start of therapy and every year thereafter while on therapy. A CBC may also be warranted for monitoring.  The side effects of this medication were discussed with the patient, including but not limited to agranulocytosis, aplastic anemia, seizures, rashes, retinopathy, and liver toxicity. Patient instructed to call the office should any adverse effect occur.  The patient verbalized understanding of the proper use and possible adverse effects of Plaquenil.  All the patient's questions and concerns were addressed. Solaraze Counseling:  I discussed with the patient the risks of Solaraze including but not limited to erythema, scaling, itching, weeping, crusting, and pain. Cantharidin Pregnancy And Lactation Text: This medication has not been proven safe during pregnancy. It is unknown if this medication is excreted in breast milk. Cephalexin Pregnancy And Lactation Text: This medication is Pregnancy Category B and considered safe during pregnancy.  It is also excreted in breast milk but can be used safely for shorter doses. Dutasteride Male Counseling: Dustasteride Counseling:  I discussed with the patient the risks of use of dutasteride including but not limited to decreased libido, decreased ejaculate volume, and gynecomastia. Women who can become pregnant should not handle medication.  All of the patient's questions and concerns were addressed. Acitretin Pregnancy And Lactation Text: This medication is Pregnancy Category X and should not be given to women who are pregnant or may become pregnant in the future. This medication is excreted in breast milk. Aklief Pregnancy And Lactation Text: It is unknown if this medication is safe to use during pregnancy.  It is unknown if this medication is excreted in breast milk.  Breastfeeding women should use the topical cream on the smallest area of the skin for the shortest time needed while breastfeeding.  Do not apply to nipple and areola. Saxenda Counseling: I reviewed the possible side effects including: thyroid tumors, kidney disease, gallbladder disease, abdominal pain, constipation, diarrhea, nausea, vomiting and pancreatitis. Do not take this medication if you have a history or family history of multiple endocrine neoplasia syndrome type 2. Side effects reviewed, pt to contact office should one occur. Siliq Counseling:  I discussed with the patient the risks of Siliq including but not limited to new or worsening depression, suicidal thoughts and behavior, immunosuppression, malignancy, posterior leukoencephalopathy syndrome, and serious infections.  The patient understands that monitoring is required including a PPD at baseline and must alert us or the primary physician if symptoms of infection or other concerning signs are noted. There is also a special program designed to monitor depression which is required with Siliq. Imiquimod Counseling:  I discussed with the patient the risks of imiquimod including but not limited to erythema, scaling, itching, weeping, crusting, and pain.  Patient understands that the inflammatory response to imiquimod is variable from person to person and was educated regarded proper titration schedule.  If flu-like symptoms develop, patient knows to discontinue the medication and contact us. Ivermectin Pregnancy And Lactation Text: This medication is Pregnancy Category C and it isn't known if it is safe during pregnancy. It is also excreted in breast milk. Zyclara Counseling:  I discussed with the patient the risks of imiquimod including but not limited to erythema, scaling, itching, weeping, crusting, and pain.  Patient understands that the inflammatory response to imiquimod is variable from person to person and was educated regarded proper titration schedule.  If flu-like symptoms develop, patient knows to discontinue the medication and contact us. Dupixent Counseling: I discussed with the patient the risks of dupilumab including but not limited to eye infection and irritation, cold sores, injection site reactions, worsening of asthma, allergic reactions and increased risk of parasitic infection.  Live vaccines should be avoided while taking dupilumab. Dupilumab will also interact with certain medications such as warfarin and cyclosporine. The patient understands that monitoring is required and they must alert us or the primary physician if symptoms of infection or other concerning signs are noted. Sski Pregnancy And Lactation Text: This medication is Pregnancy Category D and isn't considered safe during pregnancy. It is excreted in breast milk. Hydroxychloroquine Pregnancy And Lactation Text: This medication has been shown to cause fetal harm but it isn't assigned a Pregnancy Risk Category. There are small amounts excreted in breast milk. Solaraze Pregnancy And Lactation Text: This medication is Pregnancy Category B and is considered safe. There is some data to suggest avoiding during the third trimester. It is unknown if this medication is excreted in breast milk. Dutasteride Female Counseling: Dutasteride Counseling:  I discussed with the patient the risks of use of dutasteride including but not limited to decreased libido and sexual dysfunction. Explained the teratogenic nature of the medication and stressed the importance of not getting pregnant during treatment. All of the patient's questions and concerns were addressed. Bexarotene Counseling:  I discussed with the patient the risks of bexarotene including but not limited to hair loss, dry lips/skin/eyes, liver abnormalities, hyperlipidemia, pancreatitis, depression/suicidal ideation, photosensitivity, drug rash/allergic reactions, hypothyroidism, anemia, leukopenia, infection, cataracts, and teratogenicity.  Patient understands that they will need regular blood tests to check lipid profile, liver function tests, white blood cell count, thyroid function tests and pregnancy test if applicable. Clindamycin Counseling: I counseled the patient regarding use of clindamycin as an antibiotic for prophylactic and/or therapeutic purposes. Clindamycin is active against numerous classes of bacteria, including skin bacteria. Side effects may include nausea, diarrhea, gastrointestinal upset, rash, hives, yeast infections, and in rare cases, colitis. Winlevi Counseling:  I discussed with the patient the risks of topical clascoterone including but not limited to erythema, scaling, itching, and stinging. Patient voiced their understanding. Azathioprine Counseling:  I discussed with the patient the risks of azathioprine including but not limited to myelosuppression, immunosuppression, hepatotoxicity, lymphoma, and infections.  The patient understands that monitoring is required including baseline LFTs, Creatinine, possible TPMP genotyping and weekly CBCs for the first month and then every 2 weeks thereafter.  The patient verbalized understanding of the proper use and possible adverse effects of azathioprine.  All of the patient's questions and concerns were addressed. Dupixent Pregnancy And Lactation Text: This medication likely crosses the placenta but the risk for the fetus is uncertain. This medication is excreted in breast milk. Thalidomide Counseling: I discussed with the patient the risks of thalidomide including but not limited to birth defects, anxiety, weakness, chest pain, dizziness, cough and severe allergy. Low Dose Naltrexone Counseling- I discussed with the patient the potential risks and side effects of low dose naltrexone including but not limited to: more vivid dreams, headaches, nausea, vomiting, abdominal pain, fatigue, dizziness, and anxiety. Bexarotene Pregnancy And Lactation Text: This medication is Pregnancy Category X and should not be given to women who are pregnant or may become pregnant. This medication should not be used if you are breast feeding. Soolantra Counseling: I discussed with the patients the risks of topial Soolantra. This is a medicine which decreases the number of mites and inflammation in the skin. You experience burning, stinging, eye irritation or allergic reactions.  Please call our office if you develop any problems from using this medication. Dutasteride Pregnancy And Lactation Text: This medication is absolutely contraindicated in women, especially during pregnancy and breast feeding. Feminization of male fetuses is possible if taking while pregnant. Semaglutide Counseling: I reviewed the possible side effects including: thyroid tumors, kidney disease, gallbladder disease, abdominal pain, constipation, diarrhea, nausea, vomiting and pancreatitis. Do not take this medication if you have a history or family history of multiple endocrine neoplasia syndrome type 2. Side effects reviewed, pt to contact office should one occur. Simlandi Counseling:  I discussed with the patient the risks of adalimumab including but not limited to myelosuppression, immunosuppression, autoimmune hepatitis, demyelinating diseases, lymphoma, and serious infections.  The patient understands that monitoring is required including a PPD at baseline and must alert us or the primary physician if symptoms of infection or other concerning signs are noted. Winlevi Pregnancy And Lactation Text: This medication is considered safe during pregnancy and breastfeeding. Azathioprine Pregnancy And Lactation Text: This medication is Pregnancy Category D and isn't considered safe during pregnancy. It is unknown if this medication is excreted in breast milk. Clindamycin Pregnancy And Lactation Text: This medication can be used in pregnancy if certain situations. Clindamycin is also present in breast milk. Klisyri Counseling:  I discussed with the patient the risks of Klisyri including but not limited to erythema, scaling, itching, weeping, crusting, and pain. Erivedge Counseling- I discussed with the patient the risks of Erivedge including but not limited to nausea, vomiting, diarrhea, constipation, weight loss, changes in the sense of taste, decreased appetite, muscle spasms, and hair loss.  The patient verbalized understanding of the proper use and possible adverse effects of Erivedge.  All of the patient's questions and concerns were addressed. Ebglyss Counseling: I discussed with the patient the risks of lebrikizumab including but not limited to eye inflammation and irritation, cold sores, injection site reactions, allergic reactions and increased risk of parasitic infection. The patient understands that monitoring is required and they must alert us or the primary physician if symptoms of infection or other concerning signs are noted. Finasteride Male Counseling: Finasteride Counseling:  I discussed with the patient the risks of use of finasteride including but not limited to decreased libido, decreased ejaculate volume, gynecomastia, and depression. Women should not handle medication.  All of the patient's questions and concerns were addressed. Soolantra Pregnancy And Lactation Text: This medication is Pregnancy Category C. This medication is considered safe during breast feeding. Low Dose Naltrexone Pregnancy And Lactation Text: Naltrexone is pregnancy category C.  There have been no adequate and well-controlled studies in pregnant women.  It should be used in pregnancy only if the potential benefit justifies the potential risk to the fetus.   Limited data indicates that naltrexone is minimally excreted into breastmilk. Isotretinoin Counseling: Patient should get monthly blood tests, not donate blood, not drive at night if vision affected, not share medication, and not undergo elective surgery for 6 months after tx completed. Side effects reviewed, pt to contact office should one occur. Cellcept Counseling:  I discussed with the patient the risks of mycophenolate mofetil including but not limited to infection/immunosuppression, GI upset, hypokalemia, hypercholesterolemia, bone marrow suppression, lymphoproliferative disorders, malignancy, GI ulceration/bleed/perforation, colitis, interstitial lung disease, kidney failure, progressive multifocal leukoencephalopathy, and birth defects.  The patient understands that monitoring is required including a baseline creatinine and regular CBC testing. In addition, patient must alert us immediately if symptoms of infection or other concerning signs are noted. Doxycycline Counseling:  Patient counseled regarding possible photosensitivity and increased risk for sunburn.  Patient instructed to avoid sunlight, if possible.  When exposed to sunlight, patients should wear protective clothing, sunglasses, and sunscreen.  The patient was instructed to call the office immediately if the following severe adverse effects occur:  hearing changes, easy bruising/bleeding, severe headache, or vision changes.  The patient verbalized understanding of the proper use and possible adverse effects of doxycycline.  All of the patient's questions and concerns were addressed. Klisyri Pregnancy And Lactation Text: It is unknown if this medication can harm a developing fetus or if it is excreted in breast milk. VTAMA Counseling: I discussed with the patient that VTAMA is not for use in the eyes, mouth or mouth. They should call the office if they develop any signs of allergic reactions to VTAMA. The patient verbalized understanding of the proper use and possible adverse effects of VTAMA.  All of the patient's questions and concerns were addressed. Tranexamic Acid Counseling:  Patient advised of the small risk of bleeding problems with tranexamic acid. They were also instructed to call if they developed any nausea, vomiting or diarrhea. All of the patient's questions and concerns were addressed. Ebglyss Pregnancy And Lactation Text: This medication likely crosses the placenta but the risk for the fetus is uncertain. It is unknown if this medication is excreted in breast milk. Finasteride Female Counseling: Finasteride Counseling:  I discussed with the patient the risks of use of finasteride including but not limited to decreased libido and sexual dysfunction. Explained the teratogenic nature of the medication and stressed the importance of not getting pregnant during treatment. All of the patient's questions and concerns were addressed. Topical Retinoid counseling:  Patient advised to apply a pea-sized amount only at bedtime and wait 30 minutes after washing their face before applying.  If too drying, patient may add a non-comedogenic moisturizer. The patient verbalized understanding of the proper use and possible adverse effects of retinoids.  All of the patient's questions and concerns were addressed. Niacinamide Counseling: I recommended taking niacin or niacinamide, also know as vitamin B3, twice daily. Recent evidence suggests that taking vitamin B3 (500 mg twice daily) can reduce the risk of actinic keratoses and non-melanoma skin cancers. Side effects of vitamin B3 include flushing and headache. Isotretinoin Pregnancy And Lactation Text: This medication is Pregnancy Category X and is considered extremely dangerous during pregnancy. It is unknown if it is excreted in breast milk. Doxycycline Pregnancy And Lactation Text: This medication is Pregnancy Category D and not consider safe during pregnancy. It is also excreted in breast milk but is considered safe for shorter treatment courses. Hydroxyzine Counseling: Patient advised that the medication is sedating and not to drive a car after taking this medication.  Patient informed of potential adverse effects including but not limited to dry mouth, urinary retention, and blurry vision.  The patient verbalized understanding of the proper use and possible adverse effects of hydroxyzine.  All of the patient's questions and concerns were addressed. Simponi Counseling:  I discussed with the patient the risks of golimumab including but not limited to myelosuppression, immunosuppression, autoimmune hepatitis, demyelinating diseases, lymphoma, and serious infections.  The patient understands that monitoring is required including a PPD at baseline and must alert us or the primary physician if symptoms of infection or other concerning signs are noted. Xelursulaz Pregnancy And Lactation Text: This medication is Pregnancy Category D and is not considered safe during pregnancy.  The risk during breast feeding is also uncertain. Protopic Pregnancy And Lactation Text: This medication is Pregnancy Category C. It is unknown if this medication is excreted in breast milk when applied topically. Azithromycin Counseling:  I discussed with the patient the risks of azithromycin including but not limited to GI upset, allergic reaction, drug rash, diarrhea, and yeast infections. Carac Counseling:  I discussed with the patient the risks of Carac including but not limited to erythema, scaling, itching, weeping, crusting, and pain. Topical Steroids Counseling: I discussed with the patient that prolonged use of topical steroids can result in the increased appearance of superficial blood vessels (telangiectasias), lightening (hypopigmentation) and thinning of the skin (atrophy).  Patient understands to avoid using high potency steroids in skin folds, the groin or the face.  The patient verbalized understanding of the proper use and possible adverse effects of topical steroids.  All of the patient's questions and concerns were addressed. Gabapentin Counseling: I discussed with the patient the risks of gabapentin including but not limited to dizziness, somnolence, fatigue and ataxia. Oxybutynin Counseling:  I discussed with the patient the risks of oxybutynin including but not limited to skin rash, drowsiness, dry mouth, difficulty urinating, and blurred vision. Tremfya Counseling: I discussed with the patient the risks of guselkumab including but not limited to immunosuppression, serious infections, worsening of inflammatory bowel disease and drug reactions.  The patient understands that monitoring is required including a PPD at baseline and must alert us or the primary physician if symptoms of infection or other concerning signs are noted. Bimzelx Pregnancy And Lactation Text: This medication crosses the placenta and the safety is uncertain during pregnancy. It is unknown if this medication is present in breast milk. Rifampin Pregnancy And Lactation Text: This medication is Pregnancy Category C and it isn't know if it is safe during pregnancy. It is also excreted in breast milk and should not be used if you are breast feeding. Doxepin Pregnancy And Lactation Text: This medication is Pregnancy Category C and it isn't known if it is safe during pregnancy. It is also excreted in breast milk and breast feeding isn't recommended. Qbrexza Counseling:  I discussed with the patient the risks of Qbrexza including but not limited to headache, mydriasis, blurred vision, dry eyes, nasal dryness, dry mouth, dry throat, dry skin, urinary hesitation, and constipation.  Local skin reactions including erythema, burning, stinging, and itching can also occur. Itraconazole Counseling:  I discussed with the patient the risks of itraconazole including but not limited to liver damage, nausea/vomiting, neuropathy, and severe allergy.  The patient understands that this medication is best absorbed when taken with acidic beverages such as non-diet cola or ginger ale.  The patient understands that monitoring is required including baseline LFTs and repeat LFTs at intervals.  The patient understands that they are to contact us or the primary physician if concerning signs are noted. Benzoyl Peroxide Pregnancy And Lactation Text: This medication is Pregnancy Category C. It is unknown if benzoyl peroxide is excreted in breast milk. Nemluvio Counseling: I discussed with the patient the risks of nemolizumab including but not limited to headache, gastrointestinal complaints, nasopharyngitis, musculoskeletal complaints, injection site reactions, and allergic reactions. The patient understands that monitoring is required and they must alert us or the primary physician if any side effects are noted. Eucrisa Counseling: Patient may experience a mild burning sensation during topical application. Eucrisa is not approved in children less than 2 years of age. Azithromycin Pregnancy And Lactation Text: This medication is considered safe during pregnancy and is also secreted in breast milk. Topical Steroids Applications Pregnancy And Lactation Text: Most topical steroids are considered safe to use during pregnancy and lactation.  Any topical steroid applied to the breast or nipple should be washed off before breastfeeding. Sarecycline Counseling: Patient advised regarding possible photosensitivity and discoloration of the teeth, skin, lips, tongue and gums.  Patient instructed to avoid sunlight, if possible.  When exposed to sunlight, patients should wear protective clothing, sunglasses, and sunscreen.  The patient was instructed to call the office immediately if the following severe adverse effects occur:  hearing changes, easy bruising/bleeding, severe headache, or vision changes.  The patient verbalized understanding of the proper use and possible adverse effects of sarecycline.  All of the patient's questions and concerns were addressed. Cimzia Counseling:  I discussed with the patient the risks of Cimzia including but not limited to immunosuppression, allergic reactions and infections.  The patient understands that monitoring is required including a PPD at baseline and must alert us or the primary physician if symptoms of infection or other concerning signs are noted. Doxepin Counseling:  Patient advised that the medication is sedating and not to drive a car after taking this medication. Patient informed of potential adverse effects including but not limited to dry mouth, urinary retention, and blurry vision.  The patient verbalized understanding of the proper use and possible adverse effects of doxepin.  All of the patient's questions and concerns were addressed. Qbrexza Pregnancy And Lactation Text: There is no available data on Qbrexza use in pregnant women.  There is no available data on Qbrexza use in lactation. Bactrim Counseling:  I discussed with the patient the risks of sulfa antibiotics including but not limited to GI upset, allergic reaction, drug rash, diarrhea, dizziness, photosensitivity, and yeast infections.  Rarely, more serious reactions can occur including but not limited to aplastic anemia, agranulocytosis, methemoglobinemia, blood dyscrasias, liver or kidney failure, lung infiltrates or desquamative/blistering drug rashes. Griseofulvin Pregnancy And Lactation Text: This medication is Pregnancy Category X and is known to cause serious birth defects. It is unknown if this medication is excreted in breast milk but breast feeding should be avoided. Benzoyl Peroxide Counseling: Patient counseled that medicine may cause skin irritation and bleach clothing.  In the event of skin irritation, the patient was advised to reduce the amount of the drug applied or use it less frequently.   The patient verbalized understanding of the proper use and possible adverse effects of benzoyl peroxide.  All of the patient's questions and concerns were addressed. Nemluvio Pregnancy And Lactation Text: It is not known if Nemluvio causes fetal harm or is present in breast milk. Please proceed with caution if patients who are pregnant or breastfeeding. Topical Sulfur Applications Counseling: Topical Sulfur Counseling: Patient counseled that this medication may cause skin irritation or allergic reactions.  In the event of skin irritation, the patient was advised to reduce the amount of the drug applied or use it less frequently.   The patient verbalized understanding of the proper use and possible adverse effects of topical sulfur application.  All of the patient's questions and concerns were addressed. Albendazole Counseling:  I discussed with the patient the risks of albendazole including but not limited to cytopenia, kidney damage, nausea/vomiting and severe allergy.  The patient understands that this medication is being used in an off-label manner. Glycopyrrolate Counseling:  I discussed with the patient the risks of glycopyrrolate including but not limited to skin rash, drowsiness, dry mouth, difficulty urinating, and blurred vision. Propranolol Counseling:  I discussed with the patient the risks of propranolol including but not limited to low heart rate, low blood pressure, low blood sugar, restlessness and increased cold sensitivity. They should call the office if they experience any of these side effects. Cimzia Pregnancy And Lactation Text: This medication crosses the placenta but can be considered safe in certain situations. Cimzia may be excreted in breast milk. Xolair Counseling:  Patient informed of potential adverse effects including but not limited to fever, muscle aches, rash and allergic reactions.  The patient verbalized understanding of the proper use and possible adverse effects of Xolair.  All of the patient's questions and concerns were addressed. Rhofade Counseling: Rhofade is a topical medication which can decrease superficial blood flow where applied. Side effects are uncommon and include stinging, redness and allergic reactions. Griseofulvin Counseling:  I discussed with the patient the risks of griseofulvin including but not limited to photosensitivity, cytopenia, liver damage, nausea/vomiting and severe allergy.  The patient understands that this medication is best absorbed when taken with a fatty meal (e.g., ice cream or french fries). Rituxan Counseling:  I discussed with the patient the risks of Rituxan infusions. Side effects can include infusion reactions, severe drug rashes including mucocutaneous reactions, reactivation of latent hepatitis and other infections and rarely progressive multifocal leukoencephalopathy.  All of the patient's questions and concerns were addressed. Bactrim Pregnancy And Lactation Text: This medication is Pregnancy Category D and is known to cause fetal risk.  It is also excreted in breast milk. Topical Sulfur Applications Pregnancy And Lactation Text: This medication is Pregnancy Category C and has an unknown safety profile during pregnancy. It is unknown if this topical medication is excreted in breast milk. Hydroquinone Counseling:  Patient advised that medication may result in skin irritation, lightening (hypopigmentation), dryness, and burning.  In the event of skin irritation, the patient was advised to reduce the amount of the drug applied or use it less frequently.  Rarely, spots that are treated with hydroquinone can become darker (pseudoochronosis).  Should this occur, patient instructed to stop medication and call the office. The patient verbalized understanding of the proper use and possible adverse effects of hydroquinone.  All of the patient's questions and concerns were addressed. Ozempic Counseling: I reviewed the possible side effects including: thyroid tumors, kidney disease, gallbladder disease, abdominal pain, constipation, diarrhea, nausea, vomiting and pancreatitis. Do not take this medication if you have a history or family history of multiple endocrine neoplasia syndrome type 2. Side effects reviewed, pt to contact office should one occur. Glycopyrrolate Pregnancy And Lactation Text: This medication is Pregnancy Category B and is considered safe during pregnancy. It is unknown if it is excreted breast milk. Tetracycline Counseling: Patient counseled regarding possible photosensitivity and increased risk for sunburn.  Patient instructed to avoid sunlight, if possible.  When exposed to sunlight, patients should wear protective clothing, sunglasses, and sunscreen.  The patient was instructed to call the office immediately if the following severe adverse effects occur:  hearing changes, easy bruising/bleeding, severe headache, or vision changes.  The patient verbalized understanding of the proper use and possible adverse effects of tetracycline.  All of the patient's questions and concerns were addressed. Patient understands to avoid pregnancy while on therapy due to potential birth defects. Cosentyx Counseling:  I discussed with the patient the risks of Cosentyx including but not limited to worsening of Crohn's disease, immunosuppression, allergic reactions and infections.  The patient understands that monitoring is required including a PPD at baseline and must alert us or the primary physician if symptoms of infection or other concerning signs are noted. Propranolol Pregnancy And Lactation Text: This medication is Pregnancy Category C and it isn't known if it is safe during pregnancy. It is excreted in breast milk. Cimetidine Counseling:  I discussed with the patient the risks of Cimetidine including but not limited to gynecomastia, headache, diarrhea, nausea, drowsiness, arrhythmias, pancreatitis, skin rashes, psychosis, bone marrow suppression and kidney toxicity. Acitretin Counseling:  I discussed with the patient the risks of acitretin including but not limited to hair loss, dry lips/skin/eyes, liver damage, hyperlipidemia, depression/suicidal ideation, photosensitivity.  Serious rare side effects can include but are not limited to pancreatitis, pseudotumor cerebri, bony changes, clot formation/stroke/heart attack.  Patient understands that alcohol is contraindicated since it can result in liver toxicity and significantly prolong the elimination of the drug by many years. Rituxan Pregnancy And Lactation Text: This medication is Pregnancy Category C and it isn't know if it is safe during pregnancy. It is unknown if this medication is excreted in breast milk but similar antibodies are known to be excreted. Cephalexin Counseling: I counseled the patient regarding use of cephalexin as an antibiotic for prophylactic and/or therapeutic purposes. Cephalexin (commonly prescribed under brand name Keflex) is a cephalosporin antibiotic which is active against numerous classes of bacteria, including most skin bacteria. Side effects may include nausea, diarrhea, gastrointestinal upset, rash, hives, yeast infections, and in rare cases, hepatitis, kidney disease, seizures, fever, confusion, neurologic symptoms, and others. Patients with severe allergies to penicillin medications are cautioned that there is about a 10% incidence of cross-reactivity with cephalosporins. When possible, patients with penicillin allergies should use alternatives to cephalosporins for antibiotic therapy. Azelaic Acid Counseling: Patient counseled that medicine may cause skin irritation and to avoid applying near the eyes.  In the event of skin irritation, the patient was advised to reduce the amount of the drug applied or use it less frequently.   The patient verbalized understanding of the proper use and possible adverse effects of azelaic acid.  All of the patient's questions and concerns were addressed. Ivermectin Counseling:  Patient instructed to take medication on an empty stomach with a full glass of water.  Patient informed of potential adverse effects including but not limited to nausea, diarrhea, dizziness, itching, and swelling of the extremities or lymph nodes.  The patient verbalized understanding of the proper use and possible adverse effects of ivermectin.  All of the patient's questions and concerns were addressed. Wartpeel Counseling:  I discussed with the patient the risks of Wartpeel including but not limited to erythema, scaling, itching, weeping, crusting, and pain.

## 2025-06-12 DIAGNOSIS — I10 PRIMARY HYPERTENSION: ICD-10-CM

## 2025-06-12 DIAGNOSIS — E78.49 OTHER HYPERLIPIDEMIA: ICD-10-CM

## 2025-06-12 RX ORDER — AMLODIPINE BESYLATE 5 MG/1
5 TABLET ORAL DAILY
Qty: 30 TABLET | Refills: 0 | Status: SHIPPED | OUTPATIENT
Start: 2025-06-12

## 2025-06-12 RX ORDER — ATORVASTATIN CALCIUM 20 MG/1
20 TABLET, FILM COATED ORAL EVERY EVENING
Qty: 30 TABLET | Refills: 0 | Status: SHIPPED | OUTPATIENT
Start: 2025-06-12 | End: 2026-03-09

## 2025-07-15 DIAGNOSIS — I10 PRIMARY HYPERTENSION: ICD-10-CM

## 2025-07-15 DIAGNOSIS — E78.49 OTHER HYPERLIPIDEMIA: ICD-10-CM

## 2025-07-15 RX ORDER — AMLODIPINE BESYLATE 5 MG/1
5 TABLET ORAL DAILY
Qty: 14 TABLET | Refills: 0 | Status: SHIPPED | OUTPATIENT
Start: 2025-07-15

## 2025-07-15 RX ORDER — ATORVASTATIN CALCIUM 20 MG/1
20 TABLET, FILM COATED ORAL EVERY EVENING
Qty: 14 TABLET | Refills: 0 | Status: SHIPPED | OUTPATIENT
Start: 2025-07-15 | End: 2026-04-11

## (undated) DEVICE — PERCLOSE PROGLIDE™ SUTURE-MEDIATED CLOSURE SYSTEM: Brand: PERCLOSE PROGLIDE™

## (undated) DEVICE — SYRINGE KIT,PACKAGED,,150FT,MK 7(ANGIO-ARTERION, 150ML SYR KIT W/QFT,MC)(60729385): Brand: MEDRAD® MARK 7 ARTERION DISPOSABLE SYRINGE 150 ML WITH QUICK FILL TUBE

## (undated) DEVICE — UNDYED BRAIDED (POLYGLACTIN 910), SYNTHETIC ABSORBABLE SUTURE: Brand: COATED VICRYL

## (undated) DEVICE — INTRO SHEATH DRYSEAL FLEX 18F 6.0TO6.7MM 33CM

## (undated) DEVICE — SOL NACL 0.9PCT 1000ML

## (undated) DEVICE — APPL CHLORAPREP W/TINT 26ML ORNG

## (undated) DEVICE — ANTIBACTERIAL UNDYED BRAIDED (POLYGLACTIN 910), SYNTHETIC ABSORBABLE SUTURE: Brand: COATED VICRYL

## (undated) DEVICE — SUT PDS 0 CT1 36IN Z346H

## (undated) DEVICE — APL DUPLOSPRAYER MIS 40CM

## (undated) DEVICE — GOWN,NON-REINFORCED,SIRUS,SET IN SLV,XL: Brand: MEDLINE

## (undated) DEVICE — ELECTRD BLD EDGE/STD 1P 2.4X6.35MM STRL

## (undated) DEVICE — 1842 FOAM BLOCK NEEDLE COUNTER: Brand: DEVON

## (undated) DEVICE — ENDOPATH XCEL BLADELESS TROCARS WITH STABILITY SLEEVES: Brand: ENDOPATH XCEL

## (undated) DEVICE — 3M™ IOBAN™ 2 ANTIMICROBIAL INCISE DRAPE 6648EZ: Brand: IOBAN™ 2

## (undated) DEVICE — CATH TEMPO 5F BER II 65CM: Brand: TEMPO

## (undated) DEVICE — GOWN,SLEEVE,STERILE,W/CSR WRAP,1/P: Brand: MEDLINE

## (undated) DEVICE — EQUIPMENT COVER BAG TYPE 48” X 36” (122CM X 91CM): Brand: EQUIPMENT COVER BAG TYPE

## (undated) DEVICE — CVR PROB 96IN LF STRL

## (undated) DEVICE — RADIFOCUS TORQUE DEVICE MULTI-TORQUE VISE: Brand: RADIFOCUS TORQUE DEVICE

## (undated) DEVICE — PINNACLE R/O II INTRODUCER SHEATH WITH RADIOPAQUE MARKER: Brand: PINNACLE

## (undated) DEVICE — GLV SURG BIOGEL LTX PF 7

## (undated) DEVICE — PINNACLE R/O II HIFLO INTRODUCER SHEATH WITH RADIOPAQUE MARKER: Brand: PINNACLE

## (undated) DEVICE — SUT SILK 2/0 TIES 18IN A185H

## (undated) DEVICE — COVER,TABLE,HEAVY DUTY,79"X110",STRL: Brand: MEDLINE

## (undated) DEVICE — 3M™ STERI-STRIP™ REINFORCED ADHESIVE SKIN CLOSURES, R1547, 1/2 IN X 4 IN (12 MM X 100 MM), 6 STRIPS/ENVELOPE: Brand: 3M™ STERI-STRIP™

## (undated) DEVICE — ENDOPATH XCEL UNIVERSAL TROCAR STABLILITY SLEEVES: Brand: ENDOPATH XCEL

## (undated) DEVICE — SPNG GZ WOVN 4X4IN 12PLY 10/BX STRL

## (undated) DEVICE — LOU LAP SIGMOID COLON: Brand: MEDLINE INDUSTRIES, INC.

## (undated) DEVICE — HARMONIC ACE +7 LAPAROSCOPIC SHEARS ADVANCED HEMOSTASIS 5MM DIAMETER 36CM SHAFT LENGTH  FOR USE WITH GRAY HAND PIECE ONLY: Brand: HARMONIC ACE

## (undated) DEVICE — GW AMPLTZ SUPERSTIFF STR .035IN 180CM

## (undated) DEVICE — TOTAL TRAY, 16FR 10ML SIL FOLEY, URN: Brand: MEDLINE

## (undated) DEVICE — ACCESS PLATFORM FOR MINIMALLY INVASIVE SURGERY.: Brand: GELPORT® LAPAROSCOPIC  SYSTEM

## (undated) DEVICE — PK ANGIO 40

## (undated) DEVICE — BALN OCCL MOLDING .035 10TO37MM 4X90CM

## (undated) DEVICE — SUT VIC 0 CT1 36IN J946H

## (undated) DEVICE — SYR LL TP 10ML STRL

## (undated) DEVICE — INTRO SHEATH DRYSEAL FLEX 12F4TO4.7MM 33CM

## (undated) DEVICE — CATH SZ ACCUVU SEG/20CM PIG .038IN 5F 70CM

## (undated) DEVICE — DRSNG WND BORDR/ADHS NONADHR/GZ LF 2X2IN STRL

## (undated) DEVICE — ECHELON FLEX POWERED PLUS LONG ARTICULATING ENDOSCOPIC LINEAR CUTTER, 60MM: Brand: ECHELON FLEX

## (undated) DEVICE — DRSNG SURESITE WNDW 4X4.5

## (undated) DEVICE — SYR LUERLOK 30CC

## (undated) DEVICE — ADHS SKIN DERMABOND TOP ADVANCED

## (undated) DEVICE — 3M™ STERI-STRIP™ COMPOUND BENZOIN TINCTURE 40 BAGS/CARTON 4 CARTONS/CASE C1544: Brand: 3M™ STERI-STRIP™

## (undated) DEVICE — ST ACC MICROPUNCTURE STFF .018 ECHO/PLDM/TP 4F/10CM 21G/7CM

## (undated) DEVICE — MEDI-VAC YANKAUER SUCTION HANDLE W/BULBOUS TIP: Brand: CARDINAL HEALTH

## (undated) DEVICE — RADIFOCUS GLIDEWIRE: Brand: GLIDEWIRE

## (undated) DEVICE — VISUALIZATION SYSTEM: Brand: CLEARIFY

## (undated) DEVICE — SUT SILK 3/0 SH CR5 18IN C0135